# Patient Record
Sex: FEMALE | Race: WHITE | NOT HISPANIC OR LATINO | Employment: OTHER | URBAN - NONMETROPOLITAN AREA
[De-identification: names, ages, dates, MRNs, and addresses within clinical notes are randomized per-mention and may not be internally consistent; named-entity substitution may affect disease eponyms.]

---

## 2017-02-06 ENCOUNTER — OFFICE VISIT (OUTPATIENT)
Dept: CARDIOLOGY | Facility: CLINIC | Age: 72
End: 2017-02-06

## 2017-02-06 VITALS
DIASTOLIC BLOOD PRESSURE: 58 MMHG | BODY MASS INDEX: 25.2 KG/M2 | SYSTOLIC BLOOD PRESSURE: 96 MMHG | HEART RATE: 86 BPM | WEIGHT: 125 LBS | HEIGHT: 59 IN | RESPIRATION RATE: 16 BRPM

## 2017-02-06 DIAGNOSIS — I42.9 CARDIOMYOPATHY (HCC): ICD-10-CM

## 2017-02-06 DIAGNOSIS — I50.22 CHRONIC SYSTOLIC CONGESTIVE HEART FAILURE (HCC): ICD-10-CM

## 2017-02-06 DIAGNOSIS — I25.10 ASCVD (ARTERIOSCLEROTIC CARDIOVASCULAR DISEASE): Primary | ICD-10-CM

## 2017-02-06 DIAGNOSIS — R42 DIZZINESS: ICD-10-CM

## 2017-02-06 PROCEDURE — 93000 ELECTROCARDIOGRAM COMPLETE: CPT | Performed by: INTERNAL MEDICINE

## 2017-02-06 PROCEDURE — 99213 OFFICE O/P EST LOW 20 MIN: CPT | Performed by: INTERNAL MEDICINE

## 2017-02-06 NOTE — PROGRESS NOTES
Samuel Duane Kreis, MD  Kerri Kinney  1945      Patient Active Problem List   Diagnosis   •  ASCVD, S/P CABG 1997   • CKD (chronic kidney disease)   •  S/P ICD with recent gen change 2009   • History of chronic class II to III congestive heart failure   • Hypertension   • Anxiety   • Hyperlipidemia   • GERD (gastroesophageal reflux disease)   • Dizziness       Dear Dr. Torres:    Subjective     Kerri Kinney is a 71 y.o. female with the problems as listed above for cardiology follow-up of ASCVD.    History of Present Illness Ms. Kinney is a 71-year-old  female with history of known ASCVD, status post previous myocardial infarctions, status post CABG in 1997 with relatively stable clinical course since then, with underlying advanced ischemic, dilated cardiomyopathy, his year for regular cardiology follow-up.  She states that her chest pains are no worse, no better.  She notes occasional dizziness as of late.  No syncopal episodes.  She is mildly hypotensive in office today but denies any dizziness at this time.  She has chronic dyspnea with mild to moderate exertion with no PND, orthopnea or pedal edema.    Allergies   Allergen Reactions   • Codeine    • Demerol [Meperidine]    • Penicillins    • Terramycin [Oxytetracycline]        Current Outpatient Prescriptions:   •  acetaminophen (TYLENOL) 100 MG/ML solution, Take 10 mg/kg by mouth every 4 (four) hours as needed for fever., Disp: , Rfl:   •  alendronate (FOSAMAX) 70 MG tablet, Take 70 mg by mouth every 7 days., Disp: , Rfl:   •  allopurinol (ZYLOPRIM) 100 MG tablet, Take 100 mg by mouth daily., Disp: , Rfl:   •  aspirin 81 MG EC tablet, Take 81 mg by mouth daily., Disp: , Rfl:   •  atorvastatin (LIPITOR) 40 MG tablet, Take 40 mg by mouth daily., Disp: , Rfl:   •  carvedilol (COREG) 6.25 MG tablet, Take 1 tablet by mouth 2 (Two) Times a Day., Disp: 60 tablet, Rfl: 5  •  clonazePAM (KlonoPIN) 0.5 MG tablet, Take 0.5 mg by mouth 2 (two) times a day  as needed for seizures., Disp: , Rfl:   •  clopidogrel (PLAVIX) 75 MG tablet, TAKE ONE TABLET BY MOUTH DAILY, Disp: 90 tablet, Rfl: 4  •  dexlansoprazole (DEXILANT) 60 MG capsule, Take 60 mg by mouth daily., Disp: , Rfl:   •  diclofenac (VOLTAREN) 1 % gel gel, Apply 4 g topically 4 (Four) Times a Day As Needed., Disp: , Rfl:   •  DULoxetine (CYMBALTA) 60 MG capsule, Take 60 mg by mouth daily., Disp: , Rfl:   •  furosemide (LASIX) 40 MG tablet, Take 40 mg by mouth 2 (two) times a day., Disp: , Rfl:   •  gabapentin (NEURONTIN) 300 MG capsule, Take 300 mg by mouth 3 (three) times a day., Disp: , Rfl:   •  Garlic 1000 MG capsule, Take 1,000 mg by mouth Daily., Disp: , Rfl:   •  HYDROcodone-acetaminophen (NORCO) 5-325 MG per tablet, Take 1 tablet by mouth every 6 (six) hours as needed., Disp: , Rfl:   •  lisinopril (PRINIVIL,ZESTRIL) 10 MG tablet, Take 10 mg by mouth 2 (Two) Times a Day., Disp: , Rfl:   •  nitroglycerin (NITROSTAT) 0.4 MG SL tablet, Place 0.4 mg under the tongue every 5 (five) minutes as needed for chest pain. Take no more than 3 doses in 15 minutes., Disp: , Rfl:   •  Omega-3 Fatty Acids (FISH OIL) 1000 MG capsule capsule, Take  by mouth daily with breakfast., Disp: , Rfl:   •  tiZANidine (ZANAFLEX) 4 MG tablet, Take 4 mg by mouth at night as needed for muscle spasms., Disp: , Rfl:   •  fenofibrate (TRICOR) 48 MG tablet, Take 48 mg by mouth daily., Disp: , Rfl:        The following portions of the patient's history were reviewed and updated as appropriate: allergies, current medications, past family history, past medical history, past social history, past surgical history and problem list.          Review of Systems   Constitution: Negative for chills and fever.   HENT: Negative for headaches, nosebleeds and sore throat.    Respiratory: Negative for cough, hemoptysis and wheezing.    Gastrointestinal: Positive for constipation and heartburn. Negative for abdominal pain, hematemesis, hematochezia,  "melena, nausea and vomiting.   Genitourinary: Negative for dysuria and hematuria.       Objective   Blood pressure 96/58, pulse 86, resp. rate 16, height 59\" (149.9 cm), weight 125 lb (56.7 kg).  Last 2 weights    02/06/17  1416   Weight: 125 lb (56.7 kg)     Body mass index is 25.25 kg/(m^2).        Physical Exam   Constitutional: She is oriented to person, place, and time. She appears well-developed and well-nourished.   HENT:   Mouth/Throat: Oropharynx is clear and moist.   Eyes: EOM are normal. Pupils are equal, round, and reactive to light.   Neck: Neck supple. No JVD present. No tracheal deviation present. No thyromegaly present.   Cardiovascular: Normal rate, regular rhythm, S1 normal and S2 normal.  Exam reveals no gallop and no friction rub.    Pulmonary/Chest: Effort normal and breath sounds normal.   Clear   Abdominal: Soft. Bowel sounds are normal. She exhibits no mass. There is no tenderness.   Musculoskeletal: Normal range of motion. She exhibits no edema.   Lymphadenopathy:     She has no cervical adenopathy.   Neurological: She is alert and oriented to person, place, and time.   Skin: Skin is warm and dry. No rash noted.   Psychiatric: She has a normal mood and affect.         Lab Results   Component Value Date     05/31/2016    K 3.8 05/31/2016     05/31/2016    CO2 29 05/31/2016    BUN 23 05/31/2016    CREATININE 1.0 05/31/2016    GLUCOSE 106 (H) 05/31/2016    CALCIUM 9.9 05/31/2016    AST 33 (H) 05/13/2015    ALT 30 05/13/2015    ALKPHOS 82 05/13/2015    LABIL2 2.0 05/13/2015     No results found for: CKTOTAL  Lab Results   Component Value Date    WBC 6.08 05/31/2016    HGB 13.1 05/31/2016    HCT 38.6 05/31/2016     05/31/2016     Lab Results   Component Value Date    INR 1.00 05/31/2016         ECG 12 Lead  Date/Time: 2/6/2017 2:28 PM  Performed by: VINOD FISHER  Authorized by: VINOD FISHER   Comments: QTc 488            Assessment/Plan :   Kerri was seen today for " follow-up, chest pain, shortness of breath, palpitations and dizziness.  Diagnoses and all orders for this visit:    ASCVD, S/P CABG 1997, clinically stable.  Advanced ischemic cardiomyopathy, S/P ICD with recent gen change 2009, appears to be fairly well compensated.  CKD (chronic kidney disease), stage 3 (moderate)         Recommendations:    Find cost of Entresto; if affordable will change her from lisinopril to Entresto.  May lower Lasix dosage to 20 mg po daily.  If worsening edema/weight gain of more than 2-3 pounds, may take 40 mg po x5 days, then go back down to 20 mg po daily.  Keep a check on BP at home.  Continue Lisinopril, Aspirin, Plavix and Atorvastatin.   Return in about 3 months (around 5/6/2017).    As always, I appreciate very much the opportunity to participate in the cardiovascular care of your patients.      With Best Regards,    Krish Ocasio MD, [facility]    Scribed for Krish Ocasio MD by Roxann Gupta CMA 2/6/2017  3:13 PM

## 2017-02-10 ENCOUNTER — TELEPHONE (OUTPATIENT)
Dept: CARDIOLOGY | Facility: CLINIC | Age: 72
End: 2017-02-10

## 2017-02-10 DIAGNOSIS — Z79.899 MEDICATION CHANGED TO THERAPEUTIC EQUIVALENT: Primary | ICD-10-CM

## 2017-02-10 NOTE — TELEPHONE ENCOUNTER
----- Message from Halley Singleton MA sent at 2/10/2017  9:42 AM EST -----  Braden Ocasio wanted me to find the cost of Entresto he thought about switching her. Her cost for the medication would be $8.75. If you want her to change please send it to the MA in basket. Thank you

## 2017-02-10 NOTE — TELEPHONE ENCOUNTER
She will need to discontinue her lisinopril and wait 36 hours prior to starting entresto. After the 36hr wash out period, start entresto 24/26mg BID. Check BMP 1 week after starting entresto.

## 2017-02-16 ENCOUNTER — TELEPHONE (OUTPATIENT)
Dept: CARDIOLOGY | Facility: CLINIC | Age: 72
End: 2017-02-16

## 2017-02-16 NOTE — TELEPHONE ENCOUNTER
Spoke with patient she is agreeable to start Entresto. Patient was advised to stop Lisinopril and wait 36 hours to start he Entresto. She expressed understanding. Patient also expressed understanding that she would have to have lab work in 1 week and she was agreeable to do so.

## 2017-02-23 ENCOUNTER — LAB (OUTPATIENT)
Dept: LAB | Facility: HOSPITAL | Age: 72
End: 2017-02-23

## 2017-02-23 DIAGNOSIS — Z79.899 MEDICATION CHANGED TO THERAPEUTIC EQUIVALENT: ICD-10-CM

## 2017-02-23 LAB
ANION GAP SERPL CALCULATED.3IONS-SCNC: 4.5 MMOL/L (ref 3.6–11.2)
BUN BLD-MCNC: 31 MG/DL (ref 7–21)
BUN/CREAT SERPL: 29 (ref 7–25)
CALCIUM SPEC-SCNC: 9.4 MG/DL (ref 7.7–10)
CHLORIDE SERPL-SCNC: 104 MMOL/L (ref 99–112)
CO2 SERPL-SCNC: 33.5 MMOL/L (ref 24.3–31.9)
CREAT BLD-MCNC: 1.07 MG/DL (ref 0.43–1.29)
GFR SERPL CREATININE-BSD FRML MDRD: 51 ML/MIN/1.73
GLUCOSE BLD-MCNC: 85 MG/DL (ref 70–110)
OSMOLALITY SERPL CALC.SUM OF ELEC: 288.9 MOSM/KG (ref 273–305)
POTASSIUM BLD-SCNC: 4 MMOL/L (ref 3.5–5.3)
SODIUM BLD-SCNC: 142 MMOL/L (ref 135–153)

## 2017-02-23 PROCEDURE — 36415 COLL VENOUS BLD VENIPUNCTURE: CPT

## 2017-02-23 PROCEDURE — 80048 BASIC METABOLIC PNL TOTAL CA: CPT | Performed by: PHYSICIAN ASSISTANT

## 2017-02-24 ENCOUNTER — TELEPHONE (OUTPATIENT)
Dept: CARDIOLOGY | Facility: CLINIC | Age: 72
End: 2017-02-24

## 2017-02-24 NOTE — TELEPHONE ENCOUNTER
----- Message from JULES Escalante sent at 2/24/2017  8:46 AM EST -----  See how much lasix she is taking currently.

## 2017-02-27 ENCOUNTER — TELEPHONE (OUTPATIENT)
Dept: CARDIOLOGY | Facility: CLINIC | Age: 72
End: 2017-02-27

## 2017-02-27 DIAGNOSIS — Z79.899 DRUG THERAPY: Primary | ICD-10-CM

## 2017-02-27 NOTE — TELEPHONE ENCOUNTER
----- Message from JULES Escalante sent at 2/27/2017  3:04 PM EST -----  Decrease lasix to 20mg QD. Recheck BMP in 1 week.

## 2017-02-27 NOTE — TELEPHONE ENCOUNTER
Notified pt of Ana's instructions.  She was agreeable and verbalized understanding.  She asked if the blood work was to check for the medication.  Per Ana DE LA TORRE, this is to check her kidney ftn, as it was slightly decreased since last check (prior to starting Entresto).  I explained this to pt and she verbalized understanding.  I will place order for BMP.

## 2017-03-08 ENCOUNTER — LAB (OUTPATIENT)
Dept: LAB | Facility: HOSPITAL | Age: 72
End: 2017-03-08

## 2017-03-08 DIAGNOSIS — Z79.899 DRUG THERAPY: ICD-10-CM

## 2017-03-08 LAB
ANION GAP SERPL CALCULATED.3IONS-SCNC: 5.4 MMOL/L (ref 3.6–11.2)
BUN BLD-MCNC: 29 MG/DL (ref 7–21)
BUN/CREAT SERPL: 35.4 (ref 7–25)
CALCIUM SPEC-SCNC: 9.8 MG/DL (ref 7.7–10)
CHLORIDE SERPL-SCNC: 105 MMOL/L (ref 99–112)
CO2 SERPL-SCNC: 30.6 MMOL/L (ref 24.3–31.9)
CREAT BLD-MCNC: 0.82 MG/DL (ref 0.43–1.29)
GFR SERPL CREATININE-BSD FRML MDRD: 69 ML/MIN/1.73
GLUCOSE BLD-MCNC: 88 MG/DL (ref 70–110)
OSMOLALITY SERPL CALC.SUM OF ELEC: 286.5 MOSM/KG (ref 273–305)
POTASSIUM BLD-SCNC: 4.4 MMOL/L (ref 3.5–5.3)
SODIUM BLD-SCNC: 141 MMOL/L (ref 135–153)

## 2017-03-08 PROCEDURE — 80048 BASIC METABOLIC PNL TOTAL CA: CPT | Performed by: PHYSICIAN ASSISTANT

## 2017-03-08 PROCEDURE — 36415 COLL VENOUS BLD VENIPUNCTURE: CPT

## 2017-04-19 RX ORDER — SACUBITRIL AND VALSARTAN 24; 26 MG/1; MG/1
TABLET, FILM COATED ORAL
Qty: 60 TABLET | Refills: 1 | Status: SHIPPED | OUTPATIENT
Start: 2017-04-19 | End: 2017-06-30 | Stop reason: SDUPTHER

## 2017-06-19 RX ORDER — CARVEDILOL 6.25 MG/1
TABLET ORAL
Qty: 60 TABLET | Refills: 1 | Status: SHIPPED | OUTPATIENT
Start: 2017-06-19 | End: 2017-08-21 | Stop reason: SDUPTHER

## 2017-06-30 RX ORDER — SACUBITRIL AND VALSARTAN 24; 26 MG/1; MG/1
TABLET, FILM COATED ORAL
Qty: 60 TABLET | Refills: 2 | Status: SHIPPED | OUTPATIENT
Start: 2017-06-30 | End: 2017-09-20 | Stop reason: SDUPTHER

## 2017-08-21 RX ORDER — CARVEDILOL 6.25 MG/1
TABLET ORAL
Qty: 60 TABLET | Refills: 3 | Status: SHIPPED | OUTPATIENT
Start: 2017-08-21 | End: 2017-11-02 | Stop reason: SDUPTHER

## 2017-08-24 ENCOUNTER — OFFICE VISIT (OUTPATIENT)
Dept: CARDIOLOGY | Facility: CLINIC | Age: 72
End: 2017-08-24

## 2017-08-24 VITALS
RESPIRATION RATE: 16 BRPM | HEART RATE: 88 BPM | SYSTOLIC BLOOD PRESSURE: 92 MMHG | BODY MASS INDEX: 25 KG/M2 | WEIGHT: 124 LBS | DIASTOLIC BLOOD PRESSURE: 57 MMHG | HEIGHT: 59 IN

## 2017-08-24 DIAGNOSIS — I25.10 ASCVD (ARTERIOSCLEROTIC CARDIOVASCULAR DISEASE): Primary | ICD-10-CM

## 2017-08-24 DIAGNOSIS — E78.2 MIXED HYPERLIPIDEMIA: ICD-10-CM

## 2017-08-24 DIAGNOSIS — I25.5 ISCHEMIC CARDIOMYOPATHY: ICD-10-CM

## 2017-08-24 DIAGNOSIS — I42.9 CARDIOMYOPATHY (HCC): ICD-10-CM

## 2017-08-24 PROCEDURE — 99213 OFFICE O/P EST LOW 20 MIN: CPT | Performed by: INTERNAL MEDICINE

## 2017-08-24 PROCEDURE — 93000 ELECTROCARDIOGRAM COMPLETE: CPT | Performed by: INTERNAL MEDICINE

## 2017-08-24 RX ORDER — CLOPIDOGREL BISULFATE 75 MG/1
75 TABLET ORAL DAILY
Qty: 90 TABLET | Refills: 4 | Status: SHIPPED | OUTPATIENT
Start: 2017-08-24 | End: 2017-12-21 | Stop reason: SDUPTHER

## 2017-08-24 RX ORDER — NITROGLYCERIN 0.4 MG/1
TABLET SUBLINGUAL
Qty: 25 TABLET | Refills: 3 | Status: SHIPPED | OUTPATIENT
Start: 2017-08-24 | End: 2018-08-13 | Stop reason: SDUPTHER

## 2017-08-24 NOTE — PROGRESS NOTES
Samuel Duane Kreis, MD  Kerri Kinney  1945 08/24/2017    Patient Active Problem List   Diagnosis   •  ASCVD, S/P CABG 1997   • CKD (chronic kidney disease)   •  S/P ICD with recent gen change 2009   • Preop cardiovascular exam   • History of chronic class II to III congestive heart failure   • Coronary artery disease   • Hypertension   • Depression   • Anxiety   • Hyperlipidemia   • GERD (gastroesophageal reflux disease)   • Dizziness   • Ischemic cardiomyopathy       Dear Samuel Duane Kreis, MD:    Subjective     Kerri Kinney is a 72 y.o. female with the problems as listed above, presents      History of Present Illness:Ms. Kinney is a very pleasant 72-year-old  female with history of known ASCVD, status post previous myocardial infarction, status post CABG in 1997 with underlying advanced ischemic cardiomyopathy, is here for her regular cardiology follow-up.  She has some occasional left upper chest pains that seem to resolve spontaneously.  She has chronic NYHA class III dyspnea with no PND, orthopnea pedal edema.  Overall she's been doing relatively well from cardiac standpoint.  She is considering to have back surgery for degenerative disc disease with spinal stenosis with right leg pain.        Allergies   Allergen Reactions   • Codeine    • Demerol [Meperidine]    • Penicillins    • Terramycin [Oxytetracycline]    :      Current Outpatient Prescriptions:   •  allopurinol (ZYLOPRIM) 100 MG tablet, Take 100 mg by mouth daily., Disp: , Rfl:   •  aspirin 81 MG EC tablet, Take 81 mg by mouth daily., Disp: , Rfl:   •  atorvastatin (LIPITOR) 40 MG tablet, Take 40 mg by mouth daily., Disp: , Rfl:   •  clonazePAM (KlonoPIN) 0.5 MG tablet, Take 0.5 mg by mouth 2 (two) times a day as needed for seizures., Disp: , Rfl:   •  clopidogrel (PLAVIX) 75 MG tablet, Take 1 tablet by mouth Daily., Disp: 90 tablet, Rfl: 4  •  dexlansoprazole (DEXILANT) 60 MG capsule, Take 60 mg by mouth daily., Disp: , Rfl:   •   diclofenac (VOLTAREN) 1 % gel gel, Apply 4 g topically 4 (Four) Times a Day As Needed., Disp: , Rfl:   •  DULoxetine (CYMBALTA) 60 MG capsule, Take 60 mg by mouth daily., Disp: , Rfl:   •  ENTRESTO 24-26 MG tablet, TAKE ONE TABLET BY MOUTH TWICE A DAY, Disp: 60 tablet, Rfl: 2  •  gabapentin (NEURONTIN) 300 MG capsule, Take 300 mg by mouth 3 (three) times a day., Disp: , Rfl:   •  Garlic 1000 MG capsule, Take 1,000 mg by mouth Daily., Disp: , Rfl:   •  HYDROcodone-acetaminophen (NORCO) 5-325 MG per tablet, Take 1 tablet by mouth every 6 (six) hours as needed., Disp: , Rfl:   •  nitroglycerin (NITROSTAT) 0.4 MG SL tablet, Place 0.4 mg under the tongue every 5 (five) minutes as needed for chest pain. Take no more than 3 doses in 15 minutes., Disp: , Rfl:   •  Omega-3 Fatty Acids (FISH OIL) 1000 MG capsule capsule, Take  by mouth daily with breakfast., Disp: , Rfl:   •  tiZANidine (ZANAFLEX) 4 MG tablet, Take 4 mg by mouth at night as needed for muscle spasms., Disp: , Rfl:   •  acetaminophen (TYLENOL) 100 MG/ML solution, Take 10 mg/kg by mouth every 4 (four) hours as needed for fever., Disp: , Rfl:   •  alendronate (FOSAMAX) 70 MG tablet, Take 70 mg by mouth every 7 days., Disp: , Rfl:   •  carvedilol (COREG) 6.25 MG tablet, TAKE ONE TABLET BY MOUTH TWICE A DAY, Disp: 60 tablet, Rfl: 3  •  fenofibrate (TRICOR) 48 MG tablet, Take 48 mg by mouth daily., Disp: , Rfl:   •  furosemide (LASIX) 40 MG tablet, Take 40 mg by mouth As Needed., Disp: , Rfl:   •  nitroglycerin (NITROSTAT) 0.4 MG SL tablet, 1 under the tongue as needed for angina, may repeat q5mins for up three doses, Disp: 25 tablet, Rfl: 3      The following portions of the patient's history were reviewed and updated as appropriate: allergies, current medications, past family history, past medical history, past social history, past surgical history and problem list.    Social History   Substance Use Topics   • Smoking status: Former Smoker     Packs/day: 2.00      "Types: Cigarettes     Quit date: 1997   • Smokeless tobacco: Never Used   • Alcohol use No       Review of Systems   Constitution: Negative for chills and fever.   HENT: Negative for headaches, nosebleeds and sore throat.    Cardiovascular: Positive for chest pain and palpitations.   Respiratory: Positive for shortness of breath. Negative for cough, hemoptysis and wheezing.    Gastrointestinal: Negative for abdominal pain, hematemesis, hematochezia, melena, nausea and vomiting.   Genitourinary: Negative for dysuria and hematuria.       Objective   Vitals:    08/24/17 1346   BP: 92/57   Pulse: 88   Resp: 16   Weight: 124 lb (56.2 kg)   Height: 59\" (149.9 cm)     Body mass index is 25.04 kg/(m^2).        Physical Exam   Constitutional: She is oriented to person, place, and time. She appears well-developed and well-nourished.   HENT:   Head: Normocephalic.   Eyes: Conjunctivae and EOM are normal.   Neck: Normal range of motion. Neck supple. No JVD present. No tracheal deviation present. No thyromegaly present.   Cardiovascular: Normal rate, regular rhythm, S1 normal and S2 normal.  Exam reveals no gallop, no S3, no S4 and no friction rub.    No murmur heard.  Pulses:       Dorsalis pedis pulses are 1+ on the right side, and 1+ on the left side.        Posterior tibial pulses are 1+ on the right side, and 1+ on the left side.   Pulmonary/Chest: Breath sounds normal. No respiratory distress. She has no wheezes. She has no rales.   Abdominal: Soft. Bowel sounds are normal. She exhibits no mass. There is no tenderness.   Musculoskeletal: She exhibits no edema.   Neurological: She is alert and oriented to person, place, and time. No cranial nerve deficit.   Skin: Skin is warm and dry.   Psychiatric: She has a normal mood and affect.       Lab Results   Component Value Date     03/08/2017    K 4.4 03/08/2017     03/08/2017    CO2 30.6 03/08/2017    BUN 29 (H) 03/08/2017    CREATININE 0.82 03/08/2017    GLUCOSE 88 " 03/08/2017    CALCIUM 9.8 03/08/2017    AST 33 (H) 05/13/2015    ALT 30 05/13/2015    ALKPHOS 82 05/13/2015    LABIL2 2.0 05/13/2015     No results found for: CKTOTAL  Lab Results   Component Value Date    WBC 6.08 05/31/2016    HGB 13.1 05/31/2016    HCT 38.6 05/31/2016     05/31/2016     Lab Results   Component Value Date    INR 1.00 05/31/2016     Echo   No results found for: ECHOEFEST    ECG 12 Lead  Date/Time: 8/24/2017 2:02 PM  Performed by: KRISH FISHER  Authorized by: KRISH FISHER   Rhythm: sinus rhythm  Comments: Biventricular paced rhythm.            Assessment/Plan      1.  ASCVD, S/P CABG 1997, Clinically stable.      2. Ischemic cardiomyopathy with LV ejection fraction of about 25-30%.  Patient appears to be fairly well compensated.      3. Mixed hyperlipidemia     4.  S/P ICD with recent gen change 2009 , Being followed by Dr. Andrade        Recommendations:    1. Continue with aspirin, Plavix, atorvastatin, and Entresto at the current doses.  2. With regards to back surgery, I have discussed with her about the cardiac risks which would be probably at least moderate if not high cardiac risk due to her significant coronary artery disease with underlying ischemic cardiomyopathy.  However on the bright side, patient went to right knee replacement recently without any cardiac event which gives some hope that she might be able to tolerate the back surgery if it is absolutely needed.  3. Follow-up in about 3-4 months.       Return in about 6 months (around 2/24/2018).    As always, I appreciate very much the opportunity to participate in the cardiovascular care of your patients.      With Best Regards,    Krish Fisher MD, [facility]    Dragon disclaimer:  Much of this encounter note is an electronic transcription/translation of spoken language to printed text. The electronic translation of spoken language may permit erroneous, or at times, nonsensical words or phrases to be inadvertently  transcribed; Although I have reviewed the note for such errors, some may still exist.

## 2017-09-19 RX ORDER — ISOSORBIDE MONONITRATE 30 MG/1
TABLET, EXTENDED RELEASE ORAL
Qty: 90 TABLET | Refills: 0 | Status: SHIPPED | OUTPATIENT
Start: 2017-09-19 | End: 2017-12-28 | Stop reason: SDUPTHER

## 2017-09-21 RX ORDER — SACUBITRIL AND VALSARTAN 24; 26 MG/1; MG/1
TABLET, FILM COATED ORAL
Qty: 120 TABLET | Refills: 1 | Status: SHIPPED | OUTPATIENT
Start: 2017-09-21 | End: 2018-02-26 | Stop reason: SDUPTHER

## 2017-11-02 ENCOUNTER — OFFICE VISIT (OUTPATIENT)
Dept: CARDIOLOGY | Facility: CLINIC | Age: 72
End: 2017-11-02

## 2017-11-02 VITALS
HEART RATE: 79 BPM | HEIGHT: 59 IN | WEIGHT: 128 LBS | SYSTOLIC BLOOD PRESSURE: 133 MMHG | DIASTOLIC BLOOD PRESSURE: 71 MMHG | RESPIRATION RATE: 16 BRPM | BODY MASS INDEX: 25.8 KG/M2

## 2017-11-02 DIAGNOSIS — I25.10 ASCVD (ARTERIOSCLEROTIC CARDIOVASCULAR DISEASE): Primary | ICD-10-CM

## 2017-11-02 DIAGNOSIS — N18.30 STAGE 3 CHRONIC KIDNEY DISEASE (HCC): ICD-10-CM

## 2017-11-02 DIAGNOSIS — I25.5 ISCHEMIC CARDIOMYOPATHY: ICD-10-CM

## 2017-11-02 DIAGNOSIS — I10 ESSENTIAL HYPERTENSION: ICD-10-CM

## 2017-11-02 PROCEDURE — 99213 OFFICE O/P EST LOW 20 MIN: CPT | Performed by: INTERNAL MEDICINE

## 2017-11-02 RX ORDER — CARVEDILOL 6.25 MG/1
12.5 TABLET ORAL 2 TIMES DAILY WITH MEALS
Qty: 120 TABLET | Refills: 3 | Status: SHIPPED | OUTPATIENT
Start: 2017-11-02 | End: 2018-08-13 | Stop reason: DRUGHIGH

## 2017-11-02 NOTE — PROGRESS NOTES
Samuel Duane Kreis, MD  Kerri Kinney  1945 11/02/2017    Patient Active Problem List   Diagnosis   •  ASCVD, S/P CABG 1997   • CKD (chronic kidney disease)   •  S/P ICD with recent gen change 2009   • Preop cardiovascular exam   • History of chronic class II to III congestive heart failure   • Coronary artery disease   • Hypertension   • Depression   • Anxiety   • Hyperlipidemia   • GERD (gastroesophageal reflux disease)   • Dizziness   • Ischemic cardiomyopathy       Dear Samuel Duane Kreis, MD:    Subjective     Kerri Kinney is a 72 y.o. female with the problems as listed above, presents      History of Present Illness:Ms. Kinney is a pleasant 72-year-old  female with the history of known ASCVD, status post previous myocardial infarctions, status post CABG in 1997 and status post PCI's, with underlying advanced ischemic cardiomyopathy, is here for a cardiology follow-up.  She denies any complains of chest pains.  She has intermittent episodes of increasing shortness of breath.  No recent leg edema.  She denies any orthopnea.  She gained 4 pounds since 8/24/2017.       Allergies   Allergen Reactions   • Codeine    • Demerol [Meperidine]    • Penicillins    • Terramycin [Oxytetracycline]    :      Current Outpatient Prescriptions:   •  acetaminophen (TYLENOL) 100 MG/ML solution, Take 10 mg/kg by mouth every 4 (four) hours as needed for fever., Disp: , Rfl:   •  allopurinol (ZYLOPRIM) 100 MG tablet, Take 100 mg by mouth daily., Disp: , Rfl:   •  aspirin 81 MG EC tablet, Take 81 mg by mouth daily., Disp: , Rfl:   •  atorvastatin (LIPITOR) 40 MG tablet, Take 40 mg by mouth daily., Disp: , Rfl:   •  carvedilol (COREG) 6.25 MG tablet, Take 2 tablets by mouth 2 (Two) Times a Day With Meals., Disp: 120 tablet, Rfl: 3  •  clonazePAM (KlonoPIN) 0.5 MG tablet, Take 0.5 mg by mouth 2 (two) times a day as needed for seizures., Disp: , Rfl:   •  clopidogrel (PLAVIX) 75 MG tablet, Take 1 tablet by mouth  Daily., Disp: 90 tablet, Rfl: 4  •  dexlansoprazole (DEXILANT) 60 MG capsule, Take 60 mg by mouth daily., Disp: , Rfl:   •  diclofenac (VOLTAREN) 1 % gel gel, Apply 4 g topically 4 (Four) Times a Day As Needed., Disp: , Rfl:   •  DULoxetine (CYMBALTA) 60 MG capsule, Take 60 mg by mouth daily., Disp: , Rfl:   •  ENTRESTO 24-26 MG tablet, TAKE ONE TABLET BY MOUTH TWICE A DAY, Disp: 120 tablet, Rfl: 1  •  furosemide (LASIX) 40 MG tablet, Take 40 mg by mouth As Needed., Disp: , Rfl:   •  gabapentin (NEURONTIN) 300 MG capsule, Take 300 mg by mouth 3 (three) times a day., Disp: , Rfl:   •  Garlic 1000 MG capsule, Take 1,000 mg by mouth Daily., Disp: , Rfl:   •  HYDROcodone-acetaminophen (NORCO) 5-325 MG per tablet, Take 1 tablet by mouth every 6 (six) hours as needed., Disp: , Rfl:   •  nitroglycerin (NITROSTAT) 0.4 MG SL tablet, Place 0.4 mg under the tongue every 5 (five) minutes as needed for chest pain. Take no more than 3 doses in 15 minutes., Disp: , Rfl:   •  nitroglycerin (NITROSTAT) 0.4 MG SL tablet, 1 under the tongue as needed for angina, may repeat q5mins for up three doses, Disp: 25 tablet, Rfl: 3  •  Omega-3 Fatty Acids (FISH OIL) 1000 MG capsule capsule, Take  by mouth daily with breakfast., Disp: , Rfl:   •  tiZANidine (ZANAFLEX) 4 MG tablet, Take 4 mg by mouth at night as needed for muscle spasms., Disp: , Rfl:   •  alendronate (FOSAMAX) 70 MG tablet, Take 70 mg by mouth every 7 days., Disp: , Rfl:   •  fenofibrate (TRICOR) 48 MG tablet, Take 48 mg by mouth daily., Disp: , Rfl:   •  isosorbide mononitrate (IMDUR) 30 MG 24 hr tablet, TAKE ONE TABLET BY MOUTH DAILY, Disp: 90 tablet, Rfl: 0      The following portions of the patient's history were reviewed and updated as appropriate: allergies, current medications, past family history, past medical history, past social history, past surgical history and problem list.    Social History   Substance Use Topics   • Smoking status: Former Smoker     Packs/day:  "2.00     Types: Cigarettes     Quit date: 1997   • Smokeless tobacco: Never Used   • Alcohol use No       Review of Systems   Constitution: Negative for chills and fever.   HENT: Negative for nosebleeds and sore throat.    Respiratory: Negative for cough, hemoptysis and wheezing.    Gastrointestinal: Negative for abdominal pain, hematemesis, hematochezia, melena, nausea and vomiting.   Genitourinary: Negative for dysuria and hematuria.   Neurological: Negative for headaches.       Objective   Vitals:    11/02/17 1334   BP: 133/71   Pulse: 79   Resp: 16   Weight: 128 lb (58.1 kg)   Height: 59\" (149.9 cm)     Body mass index is 25.85 kg/(m^2).    11/2/17 8/24/17 2/6/17    /71 92/57 96/58   Heart Rate 79 88 86   Resp 16 16 16   Weight 128 lb (58.1 kg) 124 lb (56.2 kg) 125 lb (56.7 kg)   Height 59\" (149.9 cm) 59\" (149.9 cm) 59\" (149.9 cm)   BMI (Calculated) 25.8 25 25.2   BSA (Calculated - sq m) 1.53 sq meters 1.5 sq meters 1.51 sq meters         Physical Exam   Constitutional: She is oriented to person, place, and time. She appears well-developed and well-nourished.   HENT:   Head: Normocephalic.   Eyes: Conjunctivae and EOM are normal.   Neck: Normal range of motion. Neck supple. No JVD present. No tracheal deviation present. No thyromegaly present.   Cardiovascular: Normal rate, regular rhythm, S1 normal and S2 normal.  Exam reveals no gallop, no S3, no S4 and no friction rub.    No murmur heard.  Pulmonary/Chest: Breath sounds normal. No respiratory distress. She has no wheezes. She has no rales.   Abdominal: Soft. Bowel sounds are normal. She exhibits no mass. There is no tenderness.   Musculoskeletal: She exhibits no edema.   Neurological: She is alert and oriented to person, place, and time. No cranial nerve deficit.   Skin: Skin is warm and dry.   Psychiatric: She has a normal mood and affect.       Lab Results   Component Value Date     03/08/2017    K 4.4 03/08/2017     03/08/2017    CO2 30.6 " 03/08/2017    BUN 29 (H) 03/08/2017    CREATININE 0.82 03/08/2017    GLUCOSE 88 03/08/2017    CALCIUM 9.8 03/08/2017    AST 33 (H) 05/13/2015    ALT 30 05/13/2015    ALKPHOS 82 05/13/2015    LABIL2 2.0 05/13/2015     No results found for: CKTOTAL  Lab Results   Component Value Date    WBC 6.08 05/31/2016    HGB 13.1 05/31/2016    HCT 38.6 05/31/2016     05/31/2016     Lab Results   Component Value Date    INR 1.00 05/31/2016       No results found for: ECHOEFEST  Procedures    Assessment/Plan :     1. ASCVD, S/P CABG 1997,s/p PCI,Clinically stable.      2. Ischemic cardiomyopathy, appears to be  compensated.      3. Essential hypertension, Controlled.      4. Stage 3 chronic kidney disease            Recommendations:  1.  Will increase the Coreg to 12.5 mg by mouth twice a day.  2. I asked her to keep a check on the blood pressure at home and if it is running more than 120 systolic, maybe we could go up on the Entresto to the next dose of 49/51 mg by mouth twice a day.  3. I instructed her on 2 avoid salt rich foods especially processed meats.  4. Check CMP  5. Follow-up in 3-4 months.    Return in about 3 months (around 2/2/2018).    As always, I appreciate very much the opportunity to participate in the cardiovascular care of your patients.      With Best Regards,    Krish Ocasio MD, [facility]    Dragon disclaimer:  Much of this encounter note is an electronic transcription/translation of spoken language to printed text. The electronic translation of spoken language may permit erroneous, or at times, nonsensical words or phrases to be inadvertently transcribed; Although I have reviewed the note for such errors, some may still exist.

## 2017-11-09 ENCOUNTER — DOCUMENTATION (OUTPATIENT)
Dept: CARDIOLOGY | Facility: CLINIC | Age: 72
End: 2017-11-09

## 2017-12-08 ENCOUNTER — TRANSCRIBE ORDERS (OUTPATIENT)
Dept: ADMINISTRATIVE | Facility: HOSPITAL | Age: 72
End: 2017-12-08

## 2017-12-08 DIAGNOSIS — Z12.31 VISIT FOR SCREENING MAMMOGRAM: Primary | ICD-10-CM

## 2017-12-21 ENCOUNTER — TELEPHONE (OUTPATIENT)
Dept: CARDIOLOGY | Facility: CLINIC | Age: 72
End: 2017-12-21

## 2017-12-21 RX ORDER — CLOPIDOGREL BISULFATE 75 MG/1
75 TABLET ORAL DAILY
Qty: 90 TABLET | Refills: 0 | Status: SHIPPED | OUTPATIENT
Start: 2017-12-21 | End: 2020-09-15 | Stop reason: ALTCHOICE

## 2017-12-28 RX ORDER — ISOSORBIDE MONONITRATE 30 MG/1
TABLET, EXTENDED RELEASE ORAL
Qty: 90 TABLET | Refills: 0 | Status: SHIPPED | OUTPATIENT
Start: 2017-12-28 | End: 2019-01-17 | Stop reason: SDUPTHER

## 2018-01-09 ENCOUNTER — HOSPITAL ENCOUNTER (OUTPATIENT)
Dept: MAMMOGRAPHY | Facility: HOSPITAL | Age: 73
Discharge: HOME | End: 2018-01-09
Attending: FAMILY MEDICINE | Admitting: FAMILY MEDICINE

## 2018-01-09 DIAGNOSIS — Z12.31 VISIT FOR SCREENING MAMMOGRAM: ICD-10-CM

## 2018-01-09 PROCEDURE — 77067 SCR MAMMO BI INCL CAD: CPT

## 2018-01-09 PROCEDURE — 77063 BREAST TOMOSYNTHESIS BI: CPT | Performed by: RADIOLOGY

## 2018-01-09 PROCEDURE — 77063 BREAST TOMOSYNTHESIS BI: CPT

## 2018-01-09 PROCEDURE — 77067 SCR MAMMO BI INCL CAD: CPT | Performed by: RADIOLOGY

## 2018-02-01 ENCOUNTER — OFFICE VISIT (OUTPATIENT)
Dept: CARDIOLOGY | Facility: CLINIC | Age: 73
End: 2018-02-01

## 2018-02-01 VITALS
HEART RATE: 82 BPM | BODY MASS INDEX: 25.4 KG/M2 | DIASTOLIC BLOOD PRESSURE: 59 MMHG | HEIGHT: 59 IN | WEIGHT: 126 LBS | RESPIRATION RATE: 16 BRPM | SYSTOLIC BLOOD PRESSURE: 91 MMHG

## 2018-02-01 DIAGNOSIS — N18.30 STAGE 3 CHRONIC KIDNEY DISEASE (HCC): ICD-10-CM

## 2018-02-01 DIAGNOSIS — I25.10 ASCVD (ARTERIOSCLEROTIC CARDIOVASCULAR DISEASE): Primary | ICD-10-CM

## 2018-02-01 DIAGNOSIS — R07.2 PRECORDIAL PAIN: ICD-10-CM

## 2018-02-01 DIAGNOSIS — E78.2 MIXED HYPERLIPIDEMIA: ICD-10-CM

## 2018-02-01 DIAGNOSIS — I25.5 ISCHEMIC CARDIOMYOPATHY: ICD-10-CM

## 2018-02-01 PROCEDURE — 93000 ELECTROCARDIOGRAM COMPLETE: CPT | Performed by: INTERNAL MEDICINE

## 2018-02-01 PROCEDURE — 99214 OFFICE O/P EST MOD 30 MIN: CPT | Performed by: INTERNAL MEDICINE

## 2018-02-01 RX ORDER — IMIPRAMINE HYDROCHLORIDE 10 MG/1
10 TABLET, FILM COATED ORAL 4 TIMES DAILY
COMMUNITY

## 2018-02-01 NOTE — PROGRESS NOTES
Samuel Duane Kreis, MD  Kerri Kinney  1945 02/01/2018    Patient Active Problem List   Diagnosis   •  ASCVD, S/P CABG 1997   • CKD (chronic kidney disease) (stage III)   •  S/P ICD with recent gen change 2009   • Preop cardiovascular exam   • History of chronic class II to III congestive heart failure   • Coronary artery disease   • Hypertension   • Depression   • Anxiety   • Hyperlipidemia   • GERD (gastroesophageal reflux disease)   • Dizziness   • Ischemic cardiomyopathy with LV ejection fraction of 24-30% on echo in December 2015 with NYHA class 2-3 dyspnea.   • Precordial pain       Dear Samuel Duane Kreis, MD:    Subjective     Kerri Kinney is a 72 y.o. female with the problems as listed above, presents    Chief complaint: Follow-up of coronary artery disease and ischemic cardiomyopathy.    History of Present Illness: Ms. Kinney is a pleasant 72-year-old  female with history of known ASCVD, status post previous myocardial infarctions, status post CABG 1997 with underlying advanced ischemic cardiomyopathy, status post ICD implantation with generator change in 2009,  is here for a regular cardiology follow-up.  She has been having some intermittent episodes of left upper chest pain with radiation up into the neck and into the left arm which are relieved with sublingual nitroglycerin.These seem to occur with mild exertion and relieved with rest.  She has dyspnea with mild-to-moderate exertion with no PND, orthopnea and pedal edema.    Allergies   Allergen Reactions   • Codeine    • Demerol [Meperidine]    • Penicillins    • Terramycin [Oxytetracycline]    :      Current Outpatient Prescriptions:   •  acetaminophen (TYLENOL) 100 MG/ML solution, Take 10 mg/kg by mouth every 4 (four) hours as needed for fever., Disp: , Rfl:   •  allopurinol (ZYLOPRIM) 100 MG tablet, Take 100 mg by mouth daily., Disp: , Rfl:   •  aspirin 81 MG EC tablet, Take 81 mg by mouth daily., Disp: , Rfl:   •  atorvastatin  (LIPITOR) 40 MG tablet, Take 40 mg by mouth daily., Disp: , Rfl:   •  carvedilol (COREG) 6.25 MG tablet, Take 2 tablets by mouth 2 (Two) Times a Day With Meals., Disp: 120 tablet, Rfl: 3  •  clonazePAM (KlonoPIN) 0.5 MG tablet, Take 0.5 mg by mouth 2 (two) times a day as needed for seizures., Disp: , Rfl:   •  clopidogrel (PLAVIX) 75 MG tablet, Take 1 tablet by mouth Daily., Disp: 90 tablet, Rfl: 0  •  dexlansoprazole (DEXILANT) 60 MG capsule, Take 60 mg by mouth daily., Disp: , Rfl:   •  diclofenac (VOLTAREN) 1 % gel gel, Apply 4 g topically 4 (Four) Times a Day As Needed., Disp: , Rfl:   •  DULoxetine (CYMBALTA) 60 MG capsule, Take 60 mg by mouth daily., Disp: , Rfl:   •  ENTRESTO 24-26 MG tablet, TAKE ONE TABLET BY MOUTH TWICE A DAY, Disp: 120 tablet, Rfl: 1  •  furosemide (LASIX) 40 MG tablet, Take 40 mg by mouth As Needed., Disp: , Rfl:   •  gabapentin (NEURONTIN) 300 MG capsule, Take 300 mg by mouth 3 (three) times a day., Disp: , Rfl:   •  Garlic 1000 MG capsule, Take 1,000 mg by mouth Daily., Disp: , Rfl:   •  HYDROcodone-acetaminophen (NORCO) 5-325 MG per tablet, Take 1 tablet by mouth every 6 (six) hours as needed., Disp: , Rfl:   •  imipramine (TOFRANIL) 10 MG tablet, Take 10 mg by mouth 4 (Four) Times a Day., Disp: , Rfl:   •  isosorbide mononitrate (IMDUR) 30 MG 24 hr tablet, TAKE ONE TABLET BY MOUTH DAILY, Disp: 90 tablet, Rfl: 0  •  nitroglycerin (NITROSTAT) 0.4 MG SL tablet, Place 0.4 mg under the tongue every 5 (five) minutes as needed for chest pain. Take no more than 3 doses in 15 minutes., Disp: , Rfl:   •  nitroglycerin (NITROSTAT) 0.4 MG SL tablet, 1 under the tongue as needed for angina, may repeat q5mins for up three doses, Disp: 25 tablet, Rfl: 3  •  Omega-3 Fatty Acids (FISH OIL) 1000 MG capsule capsule, Take  by mouth daily with breakfast., Disp: , Rfl:   •  tiZANidine (ZANAFLEX) 4 MG tablet, Take 4 mg by mouth at night as needed for muscle spasms., Disp: , Rfl:   •  alendronate (FOSAMAX)  "70 MG tablet, Take 70 mg by mouth every 7 days., Disp: , Rfl:   •  fenofibrate (TRICOR) 48 MG tablet, Take 48 mg by mouth daily., Disp: , Rfl:       The following portions of the patient's history were reviewed and updated as appropriate: allergies, current medications, past family history, past medical history, past social history, past surgical history and problem list.    Social History   Substance Use Topics   • Smoking status: Former Smoker     Packs/day: 2.00     Types: Cigarettes     Quit date: 1997   • Smokeless tobacco: Never Used   • Alcohol use No       Review of Systems   Constitution: Negative for chills and fever.   HENT: Negative for nosebleeds and sore throat.    Cardiovascular: Positive for chest pain and palpitations.   Respiratory: Positive for shortness of breath. Negative for cough, hemoptysis and wheezing.    Gastrointestinal: Negative for abdominal pain, hematemesis, hematochezia, melena, nausea and vomiting.   Genitourinary: Negative for dysuria and hematuria.   Neurological: Negative for headaches.       Objective   Vitals:    02/01/18 1351   BP: 91/59   Pulse: 82   Resp: 16   Weight: 57.2 kg (126 lb)   Height: 149.9 cm (59\")     Body mass index is 25.45 kg/(m^2).        Physical Exam   Constitutional: She is oriented to person, place, and time. She appears well-developed and well-nourished.   HENT:   Head: Normocephalic.   Eyes: Conjunctivae and EOM are normal.   Neck: Normal range of motion. Neck supple. No JVD present. No tracheal deviation present. No thyromegaly present.   Cardiovascular: Normal rate, regular rhythm, S1 normal and S2 normal.  Exam reveals no gallop, no S3, no S4 and no friction rub.    No murmur heard.  Pulmonary/Chest: Breath sounds normal. No respiratory distress. She has no wheezes. She has no rales.   Abdominal: Soft. Bowel sounds are normal. She exhibits no mass. There is no tenderness.   Musculoskeletal: She exhibits no edema.   Neurological: She is alert and " oriented to person, place, and time. No cranial nerve deficit.   Skin: Skin is warm and dry.   Psychiatric: She has a normal mood and affect.       Lab Results   Component Value Date     2017    K 4.4 2017     2017    CO2 30.6 2017    BUN 29 (H) 2017    CREATININE 0.82 2017    GLUCOSE 88 2017    CALCIUM 9.8 2017    AST 33 (H) 2015    ALT 30 2015    ALKPHOS 82 2015    LABIL2 2.0 2015     No results found for: CKTOTAL  Lab Results   Component Value Date    WBC 6.08 2016    HGB 13.1 2016    HCT 38.6 2016     2016     Lab Results   Component Value Date    INR 1.00 2016       Kerri Kinney   Regadenoson Stress Test With Myocardial Perfusion SPECT (Multi Study)   Order# [id number]   Reading physician:   Krish Fisher MD Ordering physician:   Krish Fisher MD Study date: 16   Patient Information   Patient Name MRN Sex  (Age)   Kerri Kinney [id number] Female 1945 (72 y.o.)   Interpretation Summary   · Left ventricular ejection fraction is severely reduced (Calculated EF = 34%). with a large area of anteroapical dyskinesis.  · Myocardial perfusion imaging indicates a large-sized infarct located in the anterior wall and apex with no significant ischemia noted.  · Impressions are consistent with an intermediate risk study.  · the current study reveals no changes.             Echo   No results found for: ECHOEFEST    ECG 12 Lead  Date/Time: 2018 1:52 PM  Performed by: KRISH FISHER  Authorized by: KRISH FISHER               Assessment/Plan    Diagnosis Plan   1.  ASCVD, S/P CABG      2. Ischemic cardiomyopathy with LV ejection fraction of 24-30% on echo in 2015 with NYHA class 2-3 dyspnea.     3. Precordial pain.      4. Ischemic cardiomyopathy, Compensated with class III dyspnea.      5. Stage 3 chronic kidney disease.         Recommendations:    1. Continue with  aspirin, Plavix, atorvastatin, carvedilol and Entresto at the same doses.  2. Not able to increase the doses due to her baseline low blood pressure.  3. We'll evaluate her chest pains with a Lexiscan sestamibi study and her dyspnea with an Echo Doppler study.  4. Follow-up in 3-4 months.    Return in about 3 months (around 5/1/2018) for or sooner if needed.    As always, I appreciate very much the opportunity to participate in the cardiovascular care of your patients.      With Best Regards,    Krish Ocasio MD, [facility]    Dragon disclaimer:  Much of this encounter note is an electronic transcription/translation of spoken language to printed text. The electronic translation of spoken language may permit erroneous, or at times, nonsensical words or phrases to be inadvertently transcribed; Although I have reviewed the note for such errors, some may still exist.

## 2018-02-14 ENCOUNTER — HOSPITAL ENCOUNTER (OUTPATIENT)
Dept: CARDIOLOGY | Facility: HOSPITAL | Age: 73
Discharge: HOME | End: 2018-02-14
Attending: INTERNAL MEDICINE

## 2018-02-14 ENCOUNTER — HOSPITAL ENCOUNTER (OUTPATIENT)
Dept: NUCLEAR MEDICINE | Facility: HOSPITAL | Age: 73
Discharge: HOME | End: 2018-02-14
Attending: INTERNAL MEDICINE

## 2018-02-14 DIAGNOSIS — R07.2 PRECORDIAL PAIN: ICD-10-CM

## 2018-02-14 PROCEDURE — 78452 HT MUSCLE IMAGE SPECT MULT: CPT

## 2018-02-14 PROCEDURE — A9500 TC99M SESTAMIBI: HCPCS | Performed by: INTERNAL MEDICINE

## 2018-02-14 PROCEDURE — 78452 HT MUSCLE IMAGE SPECT MULT: CPT | Performed by: INTERNAL MEDICINE

## 2018-02-14 PROCEDURE — 25010000002 REGADENOSON 0.4 MG/5ML SOLUTION: Performed by: INTERNAL MEDICINE

## 2018-02-14 PROCEDURE — 93306 TTE W/DOPPLER COMPLETE: CPT

## 2018-02-14 PROCEDURE — 0 TECHNETIUM SESTAMIBI: Performed by: INTERNAL MEDICINE

## 2018-02-14 PROCEDURE — 93018 CV STRESS TEST I&R ONLY: CPT | Performed by: INTERNAL MEDICINE

## 2018-02-14 PROCEDURE — 93017 CV STRESS TEST TRACING ONLY: CPT

## 2018-02-14 PROCEDURE — 93306 TTE W/DOPPLER COMPLETE: CPT | Performed by: INTERNAL MEDICINE

## 2018-02-14 RX ADMIN — TECHNETIUM TC 99M SESTAMIBI 1 DOSE: 1 INJECTION INTRAVENOUS at 08:50

## 2018-02-14 RX ADMIN — REGADENOSON 0.4 MG: 0.08 INJECTION, SOLUTION INTRAVENOUS at 10:30

## 2018-02-14 RX ADMIN — TECHNETIUM TC 99M SESTAMIBI 1 DOSE: 1 INJECTION INTRAVENOUS at 10:30

## 2018-02-15 ENCOUNTER — APPOINTMENT (OUTPATIENT)
Dept: NUCLEAR MEDICINE | Facility: HOSPITAL | Age: 73
End: 2018-02-15
Attending: INTERNAL MEDICINE

## 2018-02-15 ENCOUNTER — APPOINTMENT (OUTPATIENT)
Dept: CARDIOLOGY | Facility: HOSPITAL | Age: 73
End: 2018-02-15
Attending: INTERNAL MEDICINE

## 2018-02-15 LAB
BH CV NUCLEAR PRIOR STUDY: 3
BH CV STRESS BP STAGE 1: NORMAL
BH CV STRESS BP STAGE 2: NORMAL
BH CV STRESS COMMENTS STAGE 1: NORMAL
BH CV STRESS COMMENTS STAGE 2: NORMAL
BH CV STRESS DOSE REGADENOSON STAGE 1: 0.4
BH CV STRESS DURATION MIN STAGE 1: 0
BH CV STRESS DURATION MIN STAGE 2: 4
BH CV STRESS DURATION SEC STAGE 1: 15
BH CV STRESS DURATION SEC STAGE 2: 0
BH CV STRESS HR STAGE 1: 90
BH CV STRESS HR STAGE 2: 81
BH CV STRESS PROTOCOL 1: NORMAL
BH CV STRESS RECOVERY BP: NORMAL MMHG
BH CV STRESS RECOVERY HR: 81 BPM
BH CV STRESS STAGE 1: 1
BH CV STRESS STAGE 2: 2
LV EF NUC BP: 35 %
MAXIMAL PREDICTED HEART RATE: 149 BPM
PERCENT MAX PREDICTED HR: 60.4 %
STRESS BASELINE BP: NORMAL MMHG
STRESS BASELINE HR: 73 BPM
STRESS PERCENT HR: 71 %
STRESS POST PEAK BP: NORMAL MMHG
STRESS POST PEAK HR: 90 BPM
STRESS TARGET HR: 127 BPM

## 2018-02-22 LAB
BH CV ECHO MEAS - % IVS THICK: 8.3 %
BH CV ECHO MEAS - % LVPW THICK: 85 %
BH CV ECHO MEAS - ACS: 1.3 CM
BH CV ECHO MEAS - AI DEC SLOPE: 325.6 CM/SEC^2
BH CV ECHO MEAS - AI MAX PG: 72.5 MMHG
BH CV ECHO MEAS - AI MAX VEL: 425.7 CM/SEC
BH CV ECHO MEAS - AI P1/2T: 383 MSEC
BH CV ECHO MEAS - AO MAX PG (FULL): 14.2 MMHG
BH CV ECHO MEAS - AO MAX PG: 19.8 MMHG
BH CV ECHO MEAS - AO MEAN PG (FULL): 9.2 MMHG
BH CV ECHO MEAS - AO MEAN PG: 12.4 MMHG
BH CV ECHO MEAS - AO ROOT AREA (BSA CORRECTED): 2.1
BH CV ECHO MEAS - AO ROOT AREA: 7.6 CM^2
BH CV ECHO MEAS - AO ROOT DIAM: 3.1 CM
BH CV ECHO MEAS - AO V2 MAX: 222.6 CM/SEC
BH CV ECHO MEAS - AO V2 MEAN: 168.1 CM/SEC
BH CV ECHO MEAS - AO V2 VTI: 54.7 CM
BH CV ECHO MEAS - AVA(I,A): 1.3 CM^2
BH CV ECHO MEAS - AVA(I,D): 1.3 CM^2
BH CV ECHO MEAS - AVA(V,A): 1.3 CM^2
BH CV ECHO MEAS - AVA(V,D): 1.3 CM^2
BH CV ECHO MEAS - BSA(HAYCOCK): 1.6 M^2
BH CV ECHO MEAS - BSA: 1.5 M^2
BH CV ECHO MEAS - BZI_BMI: 25.4 KILOGRAMS/M^2
BH CV ECHO MEAS - BZI_METRIC_HEIGHT: 149.9 CM
BH CV ECHO MEAS - BZI_METRIC_WEIGHT: 57.2 KG
BH CV ECHO MEAS - CONTRAST EF 4CH: 33.9 ML/M^2
BH CV ECHO MEAS - EDV(CUBED): 105.1 ML
BH CV ECHO MEAS - EDV(MOD-SP4): 62 ML
BH CV ECHO MEAS - EDV(TEICH): 103.3 ML
BH CV ECHO MEAS - EF(CUBED): 58.9 %
BH CV ECHO MEAS - EF(MOD-SP4): 33.9 %
BH CV ECHO MEAS - EF(TEICH): 50.5 %
BH CV ECHO MEAS - ESV(CUBED): 43.2 ML
BH CV ECHO MEAS - ESV(MOD-SP4): 41 ML
BH CV ECHO MEAS - ESV(TEICH): 51.2 ML
BH CV ECHO MEAS - FS: 25.6 %
BH CV ECHO MEAS - IVS/LVPW: 0.6
BH CV ECHO MEAS - IVSD: 0.48 CM
BH CV ECHO MEAS - IVSS: 0.52 CM
BH CV ECHO MEAS - LA DIMENSION: 3.4 CM
BH CV ECHO MEAS - LA/AO: 1.1
BH CV ECHO MEAS - LV DIASTOLIC VOL/BSA (35-75): 40.9 ML/M^2
BH CV ECHO MEAS - LV MASS(C)D: 93.7 GRAMS
BH CV ECHO MEAS - LV MASS(C)DI: 61.9 GRAMS/M^2
BH CV ECHO MEAS - LV MASS(C)S: 105 GRAMS
BH CV ECHO MEAS - LV MASS(C)SI: 69.3 GRAMS/M^2
BH CV ECHO MEAS - LV MAX PG: 5.6 MMHG
BH CV ECHO MEAS - LV MEAN PG: 3.2 MMHG
BH CV ECHO MEAS - LV SYSTOLIC VOL/BSA (12-30): 27.1 ML/M^2
BH CV ECHO MEAS - LV V1 MAX: 118.2 CM/SEC
BH CV ECHO MEAS - LV V1 MEAN: 83 CM/SEC
BH CV ECHO MEAS - LV V1 VTI: 27.9 CM
BH CV ECHO MEAS - LVIDD: 5.7 CM
BH CV ECHO MEAS - LVIDS: 3.5 CM
BH CV ECHO MEAS - LVLD AP4: 7.7 CM
BH CV ECHO MEAS - LVLS AP4: 7.1 CM
BH CV ECHO MEAS - LVOT AREA (M): 2.5 CM^2
BH CV ECHO MEAS - LVOT AREA: 2.5 CM^2
BH CV ECHO MEAS - LVOT DIAM: 1.8 CM
BH CV ECHO MEAS - LVPWD: 0.81 CM
BH CV ECHO MEAS - LVPWS: 1.5 CM
BH CV ECHO MEAS - MV A MAX VEL: 135.7 CM/SEC
BH CV ECHO MEAS - MV E MAX VEL: 79 CM/SEC
BH CV ECHO MEAS - MV E/A: 0.58
BH CV ECHO MEAS - PA ACC SLOPE: 1349 CM/SEC^2
BH CV ECHO MEAS - PA ACC TIME: 0.07 SEC
BH CV ECHO MEAS - PA PR(ACCEL): 47.3 MMHG
BH CV ECHO MEAS - RAP SYSTOLE: 10 MMHG
BH CV ECHO MEAS - RVDD: 1.3 CM
BH CV ECHO MEAS - RVSP: 46.5 MMHG
BH CV ECHO MEAS - SI(AO): 274.4 ML/M^2
BH CV ECHO MEAS - SI(CUBED): 40.8 ML/M^2
BH CV ECHO MEAS - SI(LVOT): 46.3 ML/M^2
BH CV ECHO MEAS - SI(MOD-SP4): 13.9 ML/M^2
BH CV ECHO MEAS - SI(TEICH): 34.4 ML/M^2
BH CV ECHO MEAS - SV(AO): 415.7 ML
BH CV ECHO MEAS - SV(CUBED): 61.9 ML
BH CV ECHO MEAS - SV(LVOT): 70.1 ML
BH CV ECHO MEAS - SV(MOD-SP4): 21 ML
BH CV ECHO MEAS - SV(TEICH): 52.1 ML
BH CV ECHO MEAS - TR MAX VEL: 301.9 CM/SEC
MAXIMAL PREDICTED HEART RATE: 149 BPM
STRESS TARGET HR: 127 BPM

## 2018-02-26 RX ORDER — SACUBITRIL AND VALSARTAN 24; 26 MG/1; MG/1
TABLET, FILM COATED ORAL
Qty: 120 TABLET | Refills: 3 | Status: SHIPPED | OUTPATIENT
Start: 2018-02-26 | End: 2018-11-10 | Stop reason: SDUPTHER

## 2018-05-16 ENCOUNTER — OFFICE VISIT (OUTPATIENT)
Dept: CARDIOLOGY | Facility: CLINIC | Age: 73
End: 2018-05-16

## 2018-05-16 VITALS
DIASTOLIC BLOOD PRESSURE: 73 MMHG | HEIGHT: 59 IN | HEART RATE: 82 BPM | WEIGHT: 128.8 LBS | SYSTOLIC BLOOD PRESSURE: 125 MMHG | BODY MASS INDEX: 25.96 KG/M2

## 2018-05-16 DIAGNOSIS — I25.5 ISCHEMIC CARDIOMYOPATHY: ICD-10-CM

## 2018-05-16 DIAGNOSIS — I25.10 ASCVD (ARTERIOSCLEROTIC CARDIOVASCULAR DISEASE): Primary | ICD-10-CM

## 2018-05-16 DIAGNOSIS — N18.30 STAGE 3 CHRONIC KIDNEY DISEASE (HCC): ICD-10-CM

## 2018-05-16 PROCEDURE — 99214 OFFICE O/P EST MOD 30 MIN: CPT | Performed by: INTERNAL MEDICINE

## 2018-05-16 NOTE — PROGRESS NOTES
Samuel Duane Kreis, MD  Kerri Kinney  1945 05/16/2018    Patient Active Problem List   Diagnosis   •  ASCVD, S/P CABG 1997   • CKD (chronic kidney disease) (stage III)   •  S/P ICD with recent gen change 2009   • Preop cardiovascular exam   • History of chronic class II to III congestive heart failure   • Coronary artery disease   • Hypertension   • Depression   • Anxiety   • Hyperlipidemia   • GERD (gastroesophageal reflux disease)   • Dizziness   • Ischemic cardiomyopathy with LV ejection fraction of 30-35% in feb 2018 with NYHA class 2-3 dyspnea.   • Precordial pain       Dear Samuel Duane Kreis, MD:    Subjective     Chief complaint: Follow-up of ASCVD and ischemic cardiomyopathy.    History of Present Illness:Ms. Kinney is a pleasant 72-year-old  female with history of known ASCVD, status post previous myocardial infarctions, status post CABG 1997 with underlying advanced ischemic cardiomyopathy, status post ICD implantation with generator change in 2009,  is here for a regular cardiology follow-up.  She denies any further episodes of significant chest pains since the last visit. Recent nuclear stress test revealed no evidence of myocardial ischemia.      Allergies   Allergen Reactions   • Codeine    • Demerol [Meperidine]    • Penicillins    • Terramycin [Oxytetracycline]    :      Current Outpatient Prescriptions:   •  acetaminophen (TYLENOL) 100 MG/ML solution, Take 10 mg/kg by mouth every 4 (four) hours as needed for fever., Disp: , Rfl:   •  alendronate (FOSAMAX) 70 MG tablet, Take 70 mg by mouth every 7 days., Disp: , Rfl:   •  allopurinol (ZYLOPRIM) 100 MG tablet, Take 100 mg by mouth daily., Disp: , Rfl:   •  aspirin 81 MG EC tablet, Take 81 mg by mouth daily., Disp: , Rfl:   •  atorvastatin (LIPITOR) 40 MG tablet, Take 40 mg by mouth daily., Disp: , Rfl:   •  carvedilol (COREG) 6.25 MG tablet, Take 2 tablets by mouth 2 (Two) Times a Day With Meals., Disp: 120 tablet, Rfl: 3  •   clonazePAM (KlonoPIN) 0.5 MG tablet, Take 0.5 mg by mouth 2 (two) times a day as needed for seizures., Disp: , Rfl:   •  clopidogrel (PLAVIX) 75 MG tablet, Take 1 tablet by mouth Daily., Disp: 90 tablet, Rfl: 0  •  dexlansoprazole (DEXILANT) 60 MG capsule, Take 60 mg by mouth daily., Disp: , Rfl:   •  diclofenac (VOLTAREN) 1 % gel gel, Apply 4 g topically 4 (Four) Times a Day As Needed., Disp: , Rfl:   •  DULoxetine (CYMBALTA) 60 MG capsule, Take 60 mg by mouth daily., Disp: , Rfl:   •  ENTRESTO 24-26 MG tablet, TAKE ONE TABLET BY MOUTH TWICE A DAY, Disp: 120 tablet, Rfl: 3  •  fenofibrate (TRICOR) 48 MG tablet, Take 48 mg by mouth daily., Disp: , Rfl:   •  furosemide (LASIX) 40 MG tablet, Take 40 mg by mouth As Needed., Disp: , Rfl:   •  gabapentin (NEURONTIN) 300 MG capsule, Take 300 mg by mouth 3 (three) times a day., Disp: , Rfl:   •  Garlic 1000 MG capsule, Take 1,000 mg by mouth Daily., Disp: , Rfl:   •  HYDROcodone-acetaminophen (NORCO) 5-325 MG per tablet, Take 1 tablet by mouth every 6 (six) hours as needed., Disp: , Rfl:   •  imipramine (TOFRANIL) 10 MG tablet, Take 10 mg by mouth 4 (Four) Times a Day., Disp: , Rfl:   •  isosorbide mononitrate (IMDUR) 30 MG 24 hr tablet, TAKE ONE TABLET BY MOUTH DAILY, Disp: 90 tablet, Rfl: 0  •  nitroglycerin (NITROSTAT) 0.4 MG SL tablet, Place 0.4 mg under the tongue every 5 (five) minutes as needed for chest pain. Take no more than 3 doses in 15 minutes., Disp: , Rfl:   •  nitroglycerin (NITROSTAT) 0.4 MG SL tablet, 1 under the tongue as needed for angina, may repeat q5mins for up three doses, Disp: 25 tablet, Rfl: 3  •  Omega-3 Fatty Acids (FISH OIL) 1000 MG capsule capsule, Take  by mouth daily with breakfast., Disp: , Rfl:   •  tiZANidine (ZANAFLEX) 4 MG tablet, Take 4 mg by mouth at night as needed for muscle spasms., Disp: , Rfl:       The following portions of the patient's history were reviewed and updated as appropriate: allergies, current medications, past  "family history, past medical history, past social history, past surgical history and problem list.    Social History   Substance Use Topics   • Smoking status: Former Smoker     Packs/day: 2.00     Types: Cigarettes     Quit date: 1997   • Smokeless tobacco: Never Used   • Alcohol use No       Review of Systems   Constitution: Negative for chills and fever.   HENT: Negative for nosebleeds and sore throat.    Cardiovascular: Positive for chest pain (same). Leg swelling: sometimes.   Respiratory: Positive for shortness of breath. Negative for cough, hemoptysis and wheezing.    Gastrointestinal: Negative for abdominal pain, hematemesis, hematochezia, melena, nausea and vomiting.   Genitourinary: Negative for dysuria and hematuria.   Neurological: Negative for headaches.       Objective   Vitals:    05/16/18 1331   BP: 125/73   BP Location: Right arm   Patient Position: Sitting   Cuff Size: Adult   Pulse: 82   Weight: 58.4 kg (128 lb 12.8 oz)   Height: 149.9 cm (59\")     Body mass index is 26.01 kg/m².    Physical Exam   Constitutional: She is oriented to person, place, and time. She appears well-developed and well-nourished.   HENT:   Head: Normocephalic.   Mouth/Throat: Oropharynx is clear and moist.   Eyes: Conjunctivae and EOM are normal. Pupils are equal, round, and reactive to light.   Neck: Normal range of motion. Neck supple. No JVD present. No tracheal deviation present. No thyromegaly present.   Cardiovascular: Normal rate, regular rhythm, S1 normal and S2 normal.  Exam reveals no gallop, no S3, no S4 and no friction rub.    Murmur heard.   Systolic murmur is present with a grade of 2/6  at the lower left sternal border  Pulmonary/Chest: Effort normal and breath sounds normal. No respiratory distress. She has no wheezes. She has no rales.   Abdominal: Soft. Bowel sounds are normal. She exhibits no mass. There is no tenderness.   Musculoskeletal: Normal range of motion. She exhibits no edema.   Lymphadenopathy: "     She has no cervical adenopathy.   Neurological: She is alert and oriented to person, place, and time. No cranial nerve deficit.   Skin: Skin is warm and dry. No rash noted.   Psychiatric: She has a normal mood and affect.       Lab Results   Component Value Date     2017    K 4.4 2017     2017    CO2 30.6 2017    BUN 29 (H) 2017    CREATININE 0.82 2017    GLUCOSE 88 2017    CALCIUM 9.8 2017    AST 33 (H) 2015    ALT 30 2015    ALKPHOS 82 2015    LABIL2 2.0 2015     No results found for: CKTOTAL  Lab Results   Component Value Date    WBC 6.08 2016    HGB 13.1 2016    HCT 38.6 2016     2016     Lab Results   Component Value Date    INR 1.00 2016     Kerri Kinney   Regadenoson Stress Test With Myocardial Perfusion SPECT (Multi Study)   Reading physician: Krish Ocasio MD Ordering physician: Krish Ocasio MD Study date: 18   Patient Information     Patient Name  Kerri Kinney MRN  [id number] Sex  Female  (Age)  1945 (72 y.o.)   Interpretation Summary     · Myocardial perfusion imaging indicates a medium-to-large-sized infarct located in the anterior wall, septal wall and apex with no significant ischemia noted.  · Abnormal LV wall motion consistent with dyskinesis. Apical dyskinesis..  · Left ventricular ejection fraction is moderately reduced (Calculated EF = 35%).  · Impressions are consistent with an intermediate risk study.        Kerri LUO Kinney   Echocardiogram   Reading physician: Krish Ocasio MD Ordering physician: Krish Ocasio MD Study date: 18     Patient Name  Kerri Kinney MRN  [id number] Sex  Female  (Age)  1945 (72 y.o.)   Interpretation Summary     · The left ventricular cavity is mildly dilated.  · Estimated EF appears to be in the range of 31 - 35%.  · The aortic valve is abnormal in structure. There is severe calcification of the aortic  valve mainly affecting the non, left and right coronary cusp(s).Mild to moderate aortic valve regurgitation is present. Moderate aortic valve stenosis is present.  · The mitral valve is abnormal in structure. Mitral annular calcification is present. Mild mitral valve regurgitation is present. Mild mitral valve stenosis is present.  · The tricuspid valve is abnormal. Mild tricuspid valve regurgitation is present. Estimated right ventricular systolic pressure from tricuspid regurgitation is moderately elevated (45-55 mmHg).  · Moderate pulmonary hypertension is present.  · There is no evidence of pericardial effusion.     Procedures    Assessment/Plan :    1.  ASCVD, S/P CABG 1997, Clinically stable.      2. Ischemic cardiomyopathy with LV ejection fraction of 30-35% in feb 2018 with NYHA class 2-3 dyspnea.     3. Stage 3 chronic kidney disease        Recommendations:  1. Continue with carvedilol, Entresto, aspirin, atorvastatin and Imdur at the same dose.  2. I have reviewed the nuclear stress test and echo Doppler study results with the patient.  3. Follow-up in 3-4 months.      Return in about 3 months (around 8/16/2018) for or sooner if needed.    As always, I appreciate very much the opportunity to participate in the cardiovascular care of your patients.      With Best Regards,    Krish Ocasio MD, [facility]    Dragon disclaimer:  Much of this encounter note is an electronic transcription/translation of spoken language to printed text. The electronic translation of spoken language may permit erroneous, or at times, nonsensical words or phrases to be inadvertently transcribed; Although I have reviewed the note for such errors, some may still exist.

## 2018-08-13 ENCOUNTER — OFFICE VISIT (OUTPATIENT)
Dept: CARDIOLOGY | Facility: CLINIC | Age: 73
End: 2018-08-13

## 2018-08-13 VITALS
HEART RATE: 84 BPM | WEIGHT: 130.2 LBS | SYSTOLIC BLOOD PRESSURE: 113 MMHG | BODY MASS INDEX: 26.25 KG/M2 | HEIGHT: 59 IN | OXYGEN SATURATION: 94 % | DIASTOLIC BLOOD PRESSURE: 68 MMHG

## 2018-08-13 DIAGNOSIS — I10 ESSENTIAL HYPERTENSION: ICD-10-CM

## 2018-08-13 DIAGNOSIS — I25.10 ASCVD (ARTERIOSCLEROTIC CARDIOVASCULAR DISEASE): Primary | ICD-10-CM

## 2018-08-13 DIAGNOSIS — E78.2 MIXED HYPERLIPIDEMIA: ICD-10-CM

## 2018-08-13 DIAGNOSIS — N18.30 STAGE 3 CHRONIC KIDNEY DISEASE (HCC): ICD-10-CM

## 2018-08-13 DIAGNOSIS — I25.5 ISCHEMIC CARDIOMYOPATHY: ICD-10-CM

## 2018-08-13 PROCEDURE — 99213 OFFICE O/P EST LOW 20 MIN: CPT | Performed by: INTERNAL MEDICINE

## 2018-08-13 PROCEDURE — 93000 ELECTROCARDIOGRAM COMPLETE: CPT | Performed by: INTERNAL MEDICINE

## 2018-08-13 RX ORDER — CARVEDILOL 12.5 MG/1
12.5 TABLET ORAL 2 TIMES DAILY WITH MEALS
COMMUNITY
End: 2019-01-17

## 2018-08-13 RX ORDER — HYDROCODONE BITARTRATE AND ACETAMINOPHEN 5; 325 MG/1; MG/1
1 TABLET ORAL EVERY 6 HOURS PRN
COMMUNITY

## 2018-08-13 NOTE — PROGRESS NOTES
Kreis, Samuel Duane, MD  Kerri Kinney  1945 08/13/2018    Patient Active Problem List   Diagnosis   •  ASCVD, S/P CABG 1997   • CKD (chronic kidney disease) (stage III)   •  S/P ICD with recent gen change 2009   • Preop cardiovascular exam   • History of chronic class II to III congestive heart failure   • Coronary artery disease   • Hypertension   • Depression   • Anxiety   • Hyperlipidemia   • GERD (gastroesophageal reflux disease)   • Dizziness   • Ischemic cardiomyopathy with LV ejection fraction of 30-35% in feb 2018 with NYHA class 2-3 dyspnea.   • Precordial pain       Dear Kreis, Samuel Duane, MD:    Subjective     Kerri Kinney is a 73 y.o. female with the problems as listed above, presents    Complaint: Follow-up of ASCVD and ischemic cardiomyopathy.    History of Present Illness: Ms. Kinney is a pleasant 73-year-old  female with history of known ASCVD, status post previous myocardial infarctions with CABG 1997 with underlying advanced ischemic cardiomyopathy status post ICD implantation.  She was recently noted to have malfunctioning of one of her pacemaker leads for which she was sent to [facility] and had a new lead placed.  She says she is breathing much better since then.  She denies any complaints of chest pains.    Allergies   Allergen Reactions   • Codeine    • Demerol [Meperidine]    • Penicillins    • Terramycin [Oxytetracycline]    :      Current Outpatient Prescriptions:   •  acetaminophen (TYLENOL) 100 MG/ML solution, Take 10 mg/kg by mouth every 4 (four) hours as needed for fever., Disp: , Rfl:   •  alendronate (FOSAMAX) 70 MG tablet, Take 70 mg by mouth every 7 days., Disp: , Rfl:   •  allopurinol (ZYLOPRIM) 100 MG tablet, Take 100 mg by mouth daily., Disp: , Rfl:   •  aspirin 81 MG EC tablet, Take 81 mg by mouth daily., Disp: , Rfl:   •  atorvastatin (LIPITOR) 40 MG tablet, Take 40 mg by mouth daily., Disp: , Rfl:   •  carvedilol (COREG) 12.5 MG tablet, Take 12.5  mg by mouth 2 (Two) Times a Day With Meals., Disp: , Rfl:   •  clonazePAM (KlonoPIN) 0.5 MG tablet, Take 0.5 mg by mouth 2 (two) times a day as needed for seizures., Disp: , Rfl:   •  clopidogrel (PLAVIX) 75 MG tablet, Take 1 tablet by mouth Daily., Disp: 90 tablet, Rfl: 0  •  dexlansoprazole (DEXILANT) 60 MG capsule, Take 60 mg by mouth daily., Disp: , Rfl:   •  diclofenac (VOLTAREN) 1 % gel gel, Apply 4 g topically 4 (Four) Times a Day As Needed., Disp: , Rfl:   •  DULoxetine (CYMBALTA) 60 MG capsule, Take 60 mg by mouth daily., Disp: , Rfl:   •  ENTRESTO 24-26 MG tablet, TAKE ONE TABLET BY MOUTH TWICE A DAY, Disp: 120 tablet, Rfl: 3  •  furosemide (LASIX) 40 MG tablet, Take 40 mg by mouth As Needed., Disp: , Rfl:   •  gabapentin (NEURONTIN) 300 MG capsule, Take 300 mg by mouth 3 (three) times a day., Disp: , Rfl:   •  Garlic 1000 MG capsule, Take 1,000 mg by mouth Daily., Disp: , Rfl:   •  HYDROcodone-acetaminophen (LORTAB) 5-325 MG per tablet, Take 1 tablet by mouth Every 6 (Six) Hours As Needed., Disp: , Rfl:   •  imipramine (TOFRANIL) 10 MG tablet, Take 10 mg by mouth 4 (Four) Times a Day., Disp: , Rfl:   •  isosorbide mononitrate (IMDUR) 30 MG 24 hr tablet, TAKE ONE TABLET BY MOUTH DAILY, Disp: 90 tablet, Rfl: 0  •  nitroglycerin (NITROSTAT) 0.4 MG SL tablet, Place 0.4 mg under the tongue every 5 (five) minutes as needed for chest pain. Take no more than 3 doses in 15 minutes., Disp: , Rfl:   •  nitroglycerin (NITROSTAT) 0.4 MG SL tablet, 1 under the tongue as needed for angina, may repeat q5mins for up three doses, Disp: 25 tablet, Rfl: 3  •  Omega-3 Fatty Acids (FISH OIL) 1000 MG capsule capsule, Take  by mouth daily with breakfast., Disp: , Rfl:   •  tiZANidine (ZANAFLEX) 4 MG tablet, Take 4 mg by mouth at night as needed for muscle spasms., Disp: , Rfl:       The following portions of the patient's history were reviewed and updated as appropriate: allergies, current medications, past family history, past  "medical history, past social history, past surgical history and problem list.    Social History   Substance Use Topics   • Smoking status: Former Smoker     Packs/day: 2.00     Types: Cigarettes     Quit date: 1997   • Smokeless tobacco: Never Used   • Alcohol use No       Review of Systems   Constitution: Negative for chills and fever.   HENT: Negative for nosebleeds and sore throat.    Respiratory: Negative for cough, hemoptysis and wheezing.    Gastrointestinal: Negative for abdominal pain, hematemesis, hematochezia, melena, nausea and vomiting.   Genitourinary: Negative for dysuria and hematuria.   Neurological: Negative for headaches.       Objective   Vitals:    08/13/18 1138   BP: 113/68   BP Location: Left arm   Patient Position: Sitting   Cuff Size: Adult   Pulse: 84   SpO2: 94%   Weight: 59.1 kg (130 lb 3.2 oz)   Height: 149.9 cm (59\")     Body mass index is 26.3 kg/m².        Physical Exam   Constitutional: She is oriented to person, place, and time. She appears well-developed and well-nourished.   HENT:   Mouth/Throat: Oropharynx is clear and moist.   Eyes: Pupils are equal, round, and reactive to light. EOM are normal.   Neck: Neck supple. No JVD present. No tracheal deviation present. No thyromegaly present.   Cardiovascular: Normal rate, regular rhythm, S1 normal and S2 normal.  Exam reveals no gallop and no friction rub.    No murmur heard.  Pulmonary/Chest: Effort normal and breath sounds normal.   Abdominal: Soft. Bowel sounds are normal. She exhibits no mass. There is no tenderness.   Musculoskeletal: Normal range of motion. She exhibits no edema.   Lymphadenopathy:     She has no cervical adenopathy.   Neurological: She is alert and oriented to person, place, and time.   Skin: Skin is warm and dry. No rash noted.   Psychiatric: She has a normal mood and affect.         Lab Results   Component Value Date    INR 1.00 05/31/2016         ECG 12 Lead  Date/Time: 8/13/2018 11:26 AM  Performed by: " KRISH FISHER  Authorized by: KRISH FISHER   Comments: Biventricular pacing rhythm.              Assessment/Plan    Diagnosis Plan   1.  ASCVD with previous myocardial infarctions, S/P CABG 1997, clinically stable.      2. Ischemic cardiomyopathy with LV ejection fraction of 30-35% in feb 2018 with NYHA class 2-3 dyspnea, well compensated.     3. Essential hypertension, controlled     4. Mixed hyperlipidemia     5. Stage 3 chronic kidney disease, stable     6. Status post ICD implantation in the past with recent new ventricular lead placement.           Recommendations:  1. Continue with Entresto, carvedilol, aspirin and Plavix at current doses.  2. Follow up in 5-6 months.    Return in about 5 months (around 1/13/2019).    As always, I appreciate very much the opportunity to participate in the cardiovascular care of your patients.      With Best Regards,    Krish Fisher MD, [facility]    Dragon disclaimer:  Much of this encounter note is an electronic transcription/translation of spoken language to printed text. The electronic translation of spoken language may permit erroneous, or at times, nonsensical words or phrases to be inadvertently transcribed; Although I have reviewed the note for such errors, some may still exist.

## 2018-08-14 RX ORDER — NITROGLYCERIN 0.4 MG/1
TABLET SUBLINGUAL
Qty: 25 TABLET | Refills: 3 | Status: SHIPPED | OUTPATIENT
Start: 2018-08-14 | End: 2019-08-01 | Stop reason: SDUPTHER

## 2018-09-18 RX ORDER — CARVEDILOL 12.5 MG/1
12.5 TABLET ORAL 2 TIMES DAILY
Qty: 180 TABLET | Refills: 1 | Status: SHIPPED | OUTPATIENT
Start: 2018-09-18 | End: 2019-04-17 | Stop reason: SDUPTHER

## 2018-11-12 RX ORDER — SACUBITRIL AND VALSARTAN 24; 26 MG/1; MG/1
TABLET, FILM COATED ORAL
Qty: 120 TABLET | Refills: 2 | Status: SHIPPED | OUTPATIENT
Start: 2018-11-12 | End: 2019-05-29 | Stop reason: SDUPTHER

## 2019-01-17 ENCOUNTER — OFFICE VISIT (OUTPATIENT)
Dept: CARDIOLOGY | Facility: CLINIC | Age: 74
End: 2019-01-17

## 2019-01-17 VITALS
BODY MASS INDEX: 26.41 KG/M2 | OXYGEN SATURATION: 95 % | HEIGHT: 59 IN | SYSTOLIC BLOOD PRESSURE: 111 MMHG | DIASTOLIC BLOOD PRESSURE: 63 MMHG | HEART RATE: 74 BPM | WEIGHT: 131 LBS

## 2019-01-17 DIAGNOSIS — I25.5 ISCHEMIC CARDIOMYOPATHY: ICD-10-CM

## 2019-01-17 DIAGNOSIS — I50.22 CHRONIC SYSTOLIC CONGESTIVE HEART FAILURE (HCC): ICD-10-CM

## 2019-01-17 DIAGNOSIS — I25.10 ASCVD (ARTERIOSCLEROTIC CARDIOVASCULAR DISEASE): Primary | ICD-10-CM

## 2019-01-17 DIAGNOSIS — R07.2 PRECORDIAL PAIN: ICD-10-CM

## 2019-01-17 PROCEDURE — 99214 OFFICE O/P EST MOD 30 MIN: CPT | Performed by: INTERNAL MEDICINE

## 2019-01-17 RX ORDER — ISOSORBIDE MONONITRATE 60 MG/1
60 TABLET, EXTENDED RELEASE ORAL DAILY
Qty: 30 TABLET | Refills: 5 | Status: SHIPPED | OUTPATIENT
Start: 2019-01-17 | End: 2019-07-16 | Stop reason: SDUPTHER

## 2019-01-17 NOTE — PROGRESS NOTES
Kreis, Samuel Duane, MD  Kerri Kinney  1945 01/17/2019    Patient Active Problem List   Diagnosis   •  ASCVD, S/P CABG 1997   • CKD (chronic kidney disease) (stage III)   •  S/P ICD with recent gen change 2009   • Preop cardiovascular exam   • History of chronic class II to III congestive heart failure   • Coronary artery disease   • Hypertension   • Depression   • Anxiety   • Hyperlipidemia   • GERD (gastroesophageal reflux disease)   • Dizziness   • Ischemic cardiomyopathy with LV ejection fraction of 30-35% in feb 2018 with NYHA class 2-3 dyspnea.   • Precordial pain       Dear Kreis, Samuel Duane, MD:    Subjective     Kerri Kinney is a 73 y.o. female with the problems as listed above, presents    Chief complaint: Follow-up of coronary artery disease with recent chest pains.    History of Present Illness: Ms. Kinney is a very pleasant 73-year-old  female with history of known ASCVD, status post previous myocardial infarctions, status post CABG in 1997, with underlying advanced ischemic cardiomyopathy, status post ICD implantation, presents of with complaints of having some chest pains around [place] when she apparently was stressed out due to some family situation.  She has had some intermittent chest pain since then.  She has also been more short of breath lately.  She's gained 1 pound since August 2018. She describes chest pains as being felt as pressure type of sensation on the left upper part of her chest associated with some shortness of breath but no sweating.  There were moderate intensity and would relieve spontaneously.    Allergies   Allergen Reactions   • Codeine    • Demerol [Meperidine]    • Penicillins    • Terramycin [Oxytetracycline]        Current Outpatient Medications:   •  acetaminophen (TYLENOL) 100 MG/ML solution, Take 10 mg/kg by mouth every 4 (four) hours as needed for fever., Disp: , Rfl:   •  allopurinol (ZYLOPRIM) 100 MG tablet, Take 100 mg by mouth daily., Disp:  , Rfl:   •  aspirin 81 MG EC tablet, Take 81 mg by mouth daily., Disp: , Rfl:   •  atorvastatin (LIPITOR) 40 MG tablet, Take 40 mg by mouth daily., Disp: , Rfl:   •  carvedilol (COREG) 12.5 MG tablet, Take 1 tablet by mouth 2 (Two) Times a Day., Disp: 180 tablet, Rfl: 1  •  clonazePAM (KlonoPIN) 0.5 MG tablet, Take 0.5 mg by mouth 2 (two) times a day as needed for seizures., Disp: , Rfl:   •  clopidogrel (PLAVIX) 75 MG tablet, Take 1 tablet by mouth Daily., Disp: 90 tablet, Rfl: 0  •  dexlansoprazole (DEXILANT) 60 MG capsule, Take 60 mg by mouth daily., Disp: , Rfl:   •  diclofenac (VOLTAREN) 1 % gel gel, Apply 4 g topically 4 (Four) Times a Day As Needed., Disp: , Rfl:   •  DULoxetine (CYMBALTA) 60 MG capsule, Take 60 mg by mouth daily., Disp: , Rfl:   •  ENTRESTO 24-26 MG tablet, TAKE ONE TABLET BY MOUTH TWICE A DAY, Disp: 120 tablet, Rfl: 2  •  furosemide (LASIX) 40 MG tablet, Take 40 mg by mouth As Needed., Disp: , Rfl:   •  gabapentin (NEURONTIN) 300 MG capsule, Take 300 mg by mouth 3 (three) times a day., Disp: , Rfl:   •  Garlic 1000 MG capsule, Take 1,000 mg by mouth Daily., Disp: , Rfl:   •  HYDROcodone-acetaminophen (LORTAB) 5-325 MG per tablet, Take 1 tablet by mouth Every 6 (Six) Hours As Needed., Disp: , Rfl:   •  imipramine (TOFRANIL) 10 MG tablet, Take 10 mg by mouth 4 (Four) Times a Day., Disp: , Rfl:   •  isosorbide mononitrate (IMDUR) 60 MG 24 hr tablet, Take 1 tablet by mouth Daily., Disp: 30 tablet, Rfl: 5  •  nitroglycerin (NITROSTAT) 0.4 MG SL tablet, Place 0.4 mg under the tongue every 5 (five) minutes as needed for chest pain. Take no more than 3 doses in 15 minutes., Disp: , Rfl:   •  nitroglycerin (NITROSTAT) 0.4 MG SL tablet, 1 under the tongue as needed for angina, may repeat q5mins for up three doses, Disp: 25 tablet, Rfl: 3  •  Omega-3 Fatty Acids (FISH OIL) 1000 MG capsule capsule, Take  by mouth daily with breakfast., Disp: , Rfl:   •  tiZANidine (ZANAFLEX) 4 MG tablet, Take 4 mg by  "mouth at night as needed for muscle spasms., Disp: , Rfl:       The following portions of the patient's history were reviewed and updated as appropriate: allergies, current medications, past family history, past medical history, past social history, past surgical history and problem list.    Social History     Tobacco Use   • Smoking status: Former Smoker     Packs/day: 2.00     Types: Cigarettes     Last attempt to quit:      Years since quittin.0   • Smokeless tobacco: Never Used   Substance Use Topics   • Alcohol use: No   • Drug use: No       Review of Systems   Cardiovascular: Positive for chest pain (left sided ), dyspnea on exertion and leg swelling (right foot). Negative for irregular heartbeat, near-syncope and palpitations.   Respiratory: Positive for shortness of breath.        Objective   Vitals:    19 1210   BP: 111/63   BP Location: Right arm   Patient Position: Sitting   Cuff Size: Adult   Pulse: 74   SpO2: 95%   Weight: 59.4 kg (131 lb)   Height: 149.9 cm (59\")     Body mass index is 26.46 kg/m².        Physical Exam   Constitutional: She is oriented to person, place, and time. She appears well-developed and well-nourished.   HENT:   Mouth/Throat: Oropharynx is clear and moist.   Eyes: EOM are normal. Pupils are equal, round, and reactive to light.   Neck: Neck supple. No JVD present. No tracheal deviation present. No thyromegaly present.   Cardiovascular: Normal rate, regular rhythm, S1 normal and S2 normal. Exam reveals no gallop and no friction rub.   No murmur heard.  Pulmonary/Chest: Effort normal and breath sounds normal.   Abdominal: Soft. Bowel sounds are normal. She exhibits no mass. There is no tenderness.   Musculoskeletal: Normal range of motion. She exhibits no edema.   Lymphadenopathy:     She has no cervical adenopathy.   Neurological: She is alert and oriented to person, place, and time.   Skin: Skin is warm and dry. No rash noted.   Psychiatric: She has a normal mood " and affect.       Lab Results   Component Value Date     2017    K 4.4 2017     2017    CO2 30.6 2017    BUN 29 (H) 2017    CREATININE 0.82 2017    GLUCOSE 88 2017    CALCIUM 9.8 2017    AST 33 (H) 2015    ALT 30 2015    ALKPHOS 82 2015    LABIL2 2.0 2015     No results found for: CKTOTAL  Lab Results   Component Value Date    WBC 6.08 2016    HGB 13.1 2016    HCT 38.6 2016     2016     Lab Results   Component Value Date    INR 1.00 2016     Kerri Kinney   Regadenoson Stress Test With Myocardial Perfusion SPECT (Multi Study)   Order# [id number]   Reading physician: Krish Ocasio MD Ordering physician: Krish Ocasio MD Study date: 18   Patient Information     Patient Name  Kerri Kinney MRN  [id number] Sex  Female  (Age)  1945 (73 y.o.)   Interpretation Summary     · Myocardial perfusion imaging indicates a medium-to-large-sized infarct located in the anterior wall, septal wall and apex with no significant ischemia noted.  · Abnormal LV wall motion consistent with dyskinesis. Apical dyskinesis..  · Left ventricular ejection fraction is moderately reduced (Calculated EF = 35%).  · Impressions are consistent with an intermediate risk study     Kerri Kinney   Echocardiogram   Order# [id number]   Reading physician: Krish Ocasio MD Ordering physician: Krish Ocasio MD Study date: 18   Patient Information     Patient Name  Kerri Kinney MRN  [id number] Sex  Female  (Age)  1945 (73 y.o.)   Sedation Narrator Report     Interpretation Summary     · The left ventricular cavity is mildly dilated.  · Estimated EF appears to be in the range of 31 - 35%.  · The aortic valve is abnormal in structure. There is severe calcification of the aortic valve mainly affecting the non, left and right coronary cusp(s).Mild to moderate aortic valve regurgitation is present.  Moderate aortic valve stenosis is present.  · The mitral valve is abnormal in structure. Mitral annular calcification is present. Mild mitral valve regurgitation is present. Mild mitral valve stenosis is present.  · The tricuspid valve is abnormal. Mild tricuspid valve regurgitation is present. Estimated right ventricular systolic pressure from tricuspid regurgitation is moderately elevated (45-55 mmHg).  · Moderate pulmonary hypertension is present.  · There is no evidence of pericardial effusion.       Procedures      Assessment/Plan    Diagnosis Plan   1.  ASCVD, S/P CABG 1997     2. Chronic systolic congestive heart failure (CMS/HCC)     3. Ischemic cardiomyopathy with LV ejection fraction of 30-35% in feb 2018 with NYHA class 2-3 dyspnea.     4. Precordial pain            Recommendations:  1. Will increase Imdur to 60 mg daily and see how she does.  2. Continue with other medications.   3. If recurrent chest pains, will consider further cardiac evaluation with nuclear stress test.      Return in about 3 months (around 4/17/2019).    As always, Du,  I appreciate very much the opportunity to participate in the cardiovascular care of your patients. Please do not hesitate to call me with any questions with regards to [facility] evaluation and management.           With Best Regards,        Krish Ocasio MD, FACC    Dragon disclaimer:  Much of this encounter note is an electronic transcription/translation of spoken language to printed text. The electronic translation of spoken language may permit erroneous, or at times, nonsensical words or phrases to be inadvertently transcribed; Although I have reviewed the note for such errors, some may still exist.

## 2019-03-04 ENCOUNTER — HOSPITAL ENCOUNTER (OUTPATIENT)
Dept: MAMMOGRAPHY | Facility: HOSPITAL | Age: 74
Discharge: HOME | End: 2019-03-04
Admitting: FAMILY MEDICINE

## 2019-03-04 DIAGNOSIS — Z12.39 SCREENING BREAST EXAMINATION: ICD-10-CM

## 2019-03-04 PROCEDURE — 77063 BREAST TOMOSYNTHESIS BI: CPT

## 2019-03-04 PROCEDURE — 77067 SCR MAMMO BI INCL CAD: CPT

## 2019-03-04 PROCEDURE — 77067 SCR MAMMO BI INCL CAD: CPT | Performed by: RADIOLOGY

## 2019-03-04 PROCEDURE — 77063 BREAST TOMOSYNTHESIS BI: CPT | Performed by: RADIOLOGY

## 2019-04-18 RX ORDER — CARVEDILOL 12.5 MG/1
TABLET ORAL
Qty: 60 TABLET | Refills: 0 | Status: SHIPPED | OUTPATIENT
Start: 2019-04-18 | End: 2019-07-29 | Stop reason: SDUPTHER

## 2019-04-30 ENCOUNTER — OFFICE VISIT (OUTPATIENT)
Dept: CARDIOLOGY | Facility: CLINIC | Age: 74
End: 2019-04-30

## 2019-04-30 VITALS
WEIGHT: 129.6 LBS | SYSTOLIC BLOOD PRESSURE: 106 MMHG | OXYGEN SATURATION: 92 % | HEART RATE: 81 BPM | DIASTOLIC BLOOD PRESSURE: 62 MMHG | BODY MASS INDEX: 26.13 KG/M2 | HEIGHT: 59 IN

## 2019-04-30 DIAGNOSIS — I25.10 ASCVD (ARTERIOSCLEROTIC CARDIOVASCULAR DISEASE): Primary | ICD-10-CM

## 2019-04-30 DIAGNOSIS — I25.5 ISCHEMIC CARDIOMYOPATHY: ICD-10-CM

## 2019-04-30 DIAGNOSIS — N18.30 STAGE 3 CHRONIC KIDNEY DISEASE (HCC): ICD-10-CM

## 2019-04-30 DIAGNOSIS — E78.2 MIXED HYPERLIPIDEMIA: ICD-10-CM

## 2019-04-30 PROCEDURE — 99213 OFFICE O/P EST LOW 20 MIN: CPT | Performed by: INTERNAL MEDICINE

## 2019-04-30 PROCEDURE — 93000 ELECTROCARDIOGRAM COMPLETE: CPT | Performed by: INTERNAL MEDICINE

## 2019-05-29 RX ORDER — SACUBITRIL AND VALSARTAN 24; 26 MG/1; MG/1
TABLET, FILM COATED ORAL
Qty: 60 TABLET | Refills: 2 | Status: SHIPPED | OUTPATIENT
Start: 2019-05-29 | End: 2019-08-01 | Stop reason: SDUPTHER

## 2019-05-30 RX ORDER — SACUBITRIL AND VALSARTAN 24; 26 MG/1; MG/1
TABLET, FILM COATED ORAL
Qty: 120 TABLET | Refills: 1 | Status: SHIPPED | OUTPATIENT
Start: 2019-05-30 | End: 2019-12-14 | Stop reason: SDUPTHER

## 2019-06-01 LAB
ALBUMIN SERPL-MCNC: 4.7 G/DL (ref 3.5–4.8)
ALBUMIN/GLOB SERPL: 2.8 {RATIO} (ref 1.2–2.2)
ALP SERPL-CCNC: 105 IU/L (ref 39–117)
ALT SERPL-CCNC: 29 IU/L (ref 0–32)
AMBIG ABBREV CMP14 DEFAULT: NORMAL
AST SERPL-CCNC: 36 IU/L (ref 0–40)
BILIRUB SERPL-MCNC: 0.6 MG/DL (ref 0–1.2)
BUN SERPL-MCNC: 40 MG/DL (ref 8–27)
BUN/CREAT SERPL: 35 (ref 12–28)
CALCIUM SERPL-MCNC: 9.9 MG/DL (ref 8.7–10.3)
CHLORIDE SERPL-SCNC: 98 MMOL/L (ref 96–106)
CO2 SERPL-SCNC: 27 MMOL/L (ref 20–29)
CREAT SERPL-MCNC: 1.15 MG/DL (ref 0.57–1)
GLOBULIN SER CALC-MCNC: 1.7 G/DL (ref 1.5–4.5)
GLUCOSE SERPL-MCNC: 80 MG/DL (ref 65–99)
POTASSIUM SERPL-SCNC: 4.6 MMOL/L (ref 3.5–5.2)
PROT SERPL-MCNC: 6.4 G/DL (ref 6–8.5)
SODIUM SERPL-SCNC: 141 MMOL/L (ref 134–144)

## 2019-06-03 ENCOUNTER — TELEPHONE (OUTPATIENT)
Dept: CARDIOLOGY | Facility: CLINIC | Age: 74
End: 2019-06-03

## 2019-06-03 DIAGNOSIS — I25.10 ASCVD (ARTERIOSCLEROTIC CARDIOVASCULAR DISEASE): Primary | ICD-10-CM

## 2019-06-03 NOTE — TELEPHONE ENCOUNTER
Called and given message per Dr. Ocasio.  Hold Lasix x 3 days then take qod and repeat labs in 1 week.  She v/u.

## 2019-06-03 NOTE — TELEPHONE ENCOUNTER
----- Message from rKish Ocasio MD sent at 6/3/2019  9:04 AM EDT -----  Please asked her to hold the Lasix for 3 days and then take it every other day and get a BMP in 1 week.  ----- Message -----  From: Analilia, Reflab Results In  Sent: 6/1/2019   8:16 AM  To: Krish Ocasio MD

## 2019-06-18 ENCOUNTER — TRANSCRIBE ORDERS (OUTPATIENT)
Dept: ADMINISTRATIVE | Facility: HOSPITAL | Age: 74
End: 2019-06-18

## 2019-06-18 DIAGNOSIS — I50.22 CHRONIC SYSTOLIC HEART FAILURE (HCC): Primary | ICD-10-CM

## 2019-06-25 ENCOUNTER — HOSPITAL ENCOUNTER (OUTPATIENT)
Dept: CARDIOLOGY | Facility: HOSPITAL | Age: 74
Discharge: HOME | End: 2019-06-25
Admitting: FAMILY MEDICINE

## 2019-06-25 DIAGNOSIS — I50.22 CHRONIC SYSTOLIC HEART FAILURE (HCC): ICD-10-CM

## 2019-06-25 LAB
BH CV ECHO MEAS - ACS: 1 CM
BH CV ECHO MEAS - AI DEC SLOPE: 308 CM/SEC^2
BH CV ECHO MEAS - AI MAX PG: 61.7 MMHG
BH CV ECHO MEAS - AI MAX VEL: 392.7 CM/SEC
BH CV ECHO MEAS - AI P1/2T: 373.4 MSEC
BH CV ECHO MEAS - AO MAX PG (FULL): 10.6 MMHG
BH CV ECHO MEAS - AO MAX PG: 14.8 MMHG
BH CV ECHO MEAS - AO MEAN PG (FULL): 5.8 MMHG
BH CV ECHO MEAS - AO MEAN PG: 8.8 MMHG
BH CV ECHO MEAS - AO ROOT AREA (BSA CORRECTED): 1.8
BH CV ECHO MEAS - AO ROOT AREA: 6.2 CM^2
BH CV ECHO MEAS - AO ROOT DIAM: 2.8 CM
BH CV ECHO MEAS - AO V2 MAX: 192.5 CM/SEC
BH CV ECHO MEAS - AO V2 MEAN: 136.8 CM/SEC
BH CV ECHO MEAS - AO V2 VTI: 38.8 CM
BH CV ECHO MEAS - AVA(I,A): 1.6 CM^2
BH CV ECHO MEAS - AVA(I,D): 1.6 CM^2
BH CV ECHO MEAS - AVA(V,A): 1.5 CM^2
BH CV ECHO MEAS - AVA(V,D): 1.5 CM^2
BH CV ECHO MEAS - BSA(HAYCOCK): 1.6 M^2
BH CV ECHO MEAS - BSA: 1.5 M^2
BH CV ECHO MEAS - BZI_BMI: 26.1 KILOGRAMS/M^2
BH CV ECHO MEAS - BZI_METRIC_HEIGHT: 149.9 CM
BH CV ECHO MEAS - BZI_METRIC_WEIGHT: 58.5 KG
BH CV ECHO MEAS - LA DIMENSION: 3.5 CM
BH CV ECHO MEAS - LA/AO: 1.2
BH CV ECHO MEAS - LV MAX PG: 4.2 MMHG
BH CV ECHO MEAS - LV MEAN PG: 3 MMHG
BH CV ECHO MEAS - LV V1 MAX: 103 CM/SEC
BH CV ECHO MEAS - LV V1 MEAN: 79 CM/SEC
BH CV ECHO MEAS - LV V1 VTI: 21.3 CM
BH CV ECHO MEAS - LVOT AREA (M): 2.8 CM^2
BH CV ECHO MEAS - LVOT AREA: 2.8 CM^2
BH CV ECHO MEAS - LVOT DIAM: 1.9 CM
BH CV ECHO MEAS - MV A MAX VEL: 114 CM/SEC
BH CV ECHO MEAS - MV E MAX VEL: 65.2 CM/SEC
BH CV ECHO MEAS - MV E/A: 0.57
BH CV ECHO MEAS - PA ACC SLOPE: 1172 CM/SEC^2
BH CV ECHO MEAS - PA ACC TIME: 0.09 SEC
BH CV ECHO MEAS - PA PR(ACCEL): 39.4 MMHG
BH CV ECHO MEAS - RAP SYSTOLE: 10 MMHG
BH CV ECHO MEAS - RVSP: 39.2 MMHG
BH CV ECHO MEAS - SI(AO): 156.2 ML/M^2
BH CV ECHO MEAS - SI(LVOT): 39.5 ML/M^2
BH CV ECHO MEAS - SV(AO): 239.1 ML
BH CV ECHO MEAS - SV(LVOT): 60.4 ML
BH CV ECHO MEAS - TR MAX VEL: 270 CM/SEC
MAXIMAL PREDICTED HEART RATE: 147 BPM
STRESS TARGET HR: 125 BPM

## 2019-06-25 PROCEDURE — 93306 TTE W/DOPPLER COMPLETE: CPT | Performed by: INTERNAL MEDICINE

## 2019-06-25 PROCEDURE — 93306 TTE W/DOPPLER COMPLETE: CPT

## 2019-07-16 RX ORDER — ISOSORBIDE MONONITRATE 60 MG/1
TABLET, EXTENDED RELEASE ORAL
Qty: 30 TABLET | Refills: 0 | Status: SHIPPED | OUTPATIENT
Start: 2019-07-16 | End: 2019-08-21 | Stop reason: SDUPTHER

## 2019-07-29 RX ORDER — CARVEDILOL 12.5 MG/1
TABLET ORAL
Qty: 60 TABLET | Refills: 0 | Status: SHIPPED | OUTPATIENT
Start: 2019-07-29 | End: 2019-09-04 | Stop reason: SDUPTHER

## 2019-08-01 ENCOUNTER — OFFICE VISIT (OUTPATIENT)
Dept: CARDIOLOGY | Facility: CLINIC | Age: 74
End: 2019-08-01

## 2019-08-01 VITALS
WEIGHT: 128 LBS | DIASTOLIC BLOOD PRESSURE: 65 MMHG | OXYGEN SATURATION: 96 % | BODY MASS INDEX: 25.8 KG/M2 | SYSTOLIC BLOOD PRESSURE: 144 MMHG | HEIGHT: 59 IN | HEART RATE: 70 BPM

## 2019-08-01 DIAGNOSIS — I10 ESSENTIAL HYPERTENSION: ICD-10-CM

## 2019-08-01 DIAGNOSIS — I50.22 CHRONIC SYSTOLIC CONGESTIVE HEART FAILURE (HCC): ICD-10-CM

## 2019-08-01 DIAGNOSIS — I25.10 ASCVD (ARTERIOSCLEROTIC CARDIOVASCULAR DISEASE): Primary | ICD-10-CM

## 2019-08-01 DIAGNOSIS — R07.2 PRECORDIAL PAIN: ICD-10-CM

## 2019-08-01 DIAGNOSIS — I25.5 ISCHEMIC CARDIOMYOPATHY: ICD-10-CM

## 2019-08-01 DIAGNOSIS — E78.2 MIXED HYPERLIPIDEMIA: ICD-10-CM

## 2019-08-01 PROCEDURE — 99214 OFFICE O/P EST MOD 30 MIN: CPT | Performed by: NURSE PRACTITIONER

## 2019-08-01 RX ORDER — NITROGLYCERIN 0.4 MG/1
TABLET SUBLINGUAL
Qty: 25 TABLET | Refills: 3 | Status: SHIPPED | OUTPATIENT
Start: 2019-08-01

## 2019-08-01 NOTE — PROGRESS NOTES
Kreis, Samuel Duane, MD  Kerri Kinney  1945 08/01/2019    Patient Active Problem List   Diagnosis   •  ASCVD, S/P CABG 1997   • CKD (chronic kidney disease) (stage III)   •  S/P ICD with recent gen change 2009   • Preop cardiovascular exam   • History of chronic class II to III congestive heart failure   • Coronary artery disease   • Hypertension   • Depression   • Anxiety   • Hyperlipidemia   • GERD (gastroesophageal reflux disease)   • Dizziness   • Ischemic cardiomyopathy with LV ejection fraction of 30-35% in feb 2018 with NYHA class 2-3 dyspnea.   • Precordial pain       Dear Kreis, Samuel Duane, MD:    Subjective     Chief Complaint   Patient presents with   • ASCVD     Follow up   • Chest Pain     left sided, feels like around pacer implantation site   • Shortness of Breath     no change           History of Present Illness:    Kerri Kinney is a 74 y.o. female with a past medical history significant for ASCVD with previous MI followed by CABG in 1997.  She also has a history of advanced underlying ischemic cardiomyopathy with EF of 30 to 35% status post ICD implantation with recent revision of the ICD by Dr. Patel scented 2016.  She presents today for routine cardiology follow-up.  She reports she does have some chest pains around her ICD which she describes as sharp fleeting pains.  These are not related to exertion or movements.  She cannot identify any alleviating factors.  This occurs very rarely.  However, she does note recently she has been more short of breath over the past couple of months.  Her PCP ordered an echocardiogram which revealed an EF of 31 to 35% with moderate aortic valve regurgitation, mild aortic valve stenosis, mild mitral valve regurgitation, and mild mitral valve stenosis.  There was some hypokinesis and dyskinesis of the LV wall.  Pulmonary hypertension which was present on her study 2018 was resolved. She denies edema, orthopnea, and PND.  She is actually lost weight  since her last visit here.        Allergies   Allergen Reactions   • Codeine    • Demerol [Meperidine]    • Penicillins    • Terramycin [Oxytetracycline]    :      Current Outpatient Medications:   •  acetaminophen (TYLENOL) 100 MG/ML solution, Take 10 mg/kg by mouth every 4 (four) hours as needed for fever., Disp: , Rfl:   •  allopurinol (ZYLOPRIM) 100 MG tablet, Take 100 mg by mouth daily., Disp: , Rfl:   •  aspirin 81 MG EC tablet, Take 81 mg by mouth daily., Disp: , Rfl:   •  atorvastatin (LIPITOR) 40 MG tablet, Take 40 mg by mouth daily., Disp: , Rfl:   •  carvedilol (COREG) 12.5 MG tablet, TAKE ONE TABLET BY MOUTH TWICE A DAY, Disp: 60 tablet, Rfl: 0  •  clonazePAM (KlonoPIN) 0.5 MG tablet, Take 0.5 mg by mouth 2 (two) times a day as needed for seizures., Disp: , Rfl:   •  clopidogrel (PLAVIX) 75 MG tablet, Take 1 tablet by mouth Daily., Disp: 90 tablet, Rfl: 0  •  dexlansoprazole (DEXILANT) 60 MG capsule, Take 60 mg by mouth daily., Disp: , Rfl:   •  diclofenac (VOLTAREN) 1 % gel gel, Apply 4 g topically 4 (Four) Times a Day As Needed., Disp: , Rfl:   •  DULoxetine (CYMBALTA) 60 MG capsule, Take 60 mg by mouth daily., Disp: , Rfl:   •  ENTRESTO 24-26 MG tablet, TAKE ONE TABLET BY MOUTH TWICE A DAY, Disp: 120 tablet, Rfl: 1  •  furosemide (LASIX) 40 MG tablet, Take 40 mg by mouth As Needed., Disp: , Rfl:   •  Garlic 1000 MG capsule, Take 1,000 mg by mouth Daily., Disp: , Rfl:   •  HYDROcodone-acetaminophen (LORTAB) 5-325 MG per tablet, Take 1 tablet by mouth Every 6 (Six) Hours As Needed., Disp: , Rfl:   •  imipramine (TOFRANIL) 10 MG tablet, Take 10 mg by mouth 4 (Four) Times a Day., Disp: , Rfl:   •  isosorbide mononitrate (IMDUR) 60 MG 24 hr tablet, TAKE ONE TABLET BY MOUTH DAILY, Disp: 30 tablet, Rfl: 0  •  nitroglycerin (NITROSTAT) 0.4 MG SL tablet, 1 under the tongue as needed for angina, may repeat q5mins for up three doses, Disp: 25 tablet, Rfl: 3  •  Omega-3 Fatty Acids (FISH OIL) 1000 MG capsule  "capsule, Take  by mouth daily with breakfast., Disp: , Rfl:   •  tiZANidine (ZANAFLEX) 4 MG tablet, Take 4 mg by mouth at night as needed for muscle spasms., Disp: , Rfl:       The following portions of the patient's history were reviewed and updated as appropriate: allergies, current medications, past family history, past medical history, past social history, past surgical history and problem list.    Social History     Tobacco Use   • Smoking status: Former Smoker     Packs/day: 2.00     Types: Cigarettes     Last attempt to quit:      Years since quittin.5   • Smokeless tobacco: Never Used   Substance Use Topics   • Alcohol use: No   • Drug use: No       Review of Systems   Constitution: Negative for weakness and malaise/fatigue.   Cardiovascular: Positive for chest pain, dyspnea on exertion and leg swelling (right sided from foot surgery). Negative for irregular heartbeat, near-syncope, orthopnea, palpitations, paroxysmal nocturnal dyspnea and syncope.   Respiratory: Negative for cough, shortness of breath and wheezing.    Neurological: Negative for dizziness and light-headedness.       Objective   Vitals:    19 1445   BP: 144/65   BP Location: Right arm   Patient Position: Sitting   Cuff Size: Adult   Pulse: 70   SpO2: 96%   Weight: 58.1 kg (128 lb)   Height: 149.9 cm (59\")     Body mass index is 25.85 kg/m².        Physical Exam   Constitutional: She is oriented to person, place, and time. She appears well-developed and well-nourished.   HENT:   Head: Normocephalic and atraumatic.   Cardiovascular: Normal rate and regular rhythm. Exam reveals no S3 and no S4.   Murmur (grade 2/6 systolic murmur LLSB, RUSB) heard.  Pulmonary/Chest: Effort normal and breath sounds normal. She has no wheezes. She has no rales.   Abdominal: Soft. Bowel sounds are normal.   Musculoskeletal: She exhibits no edema.   Neurological: She is alert and oriented to person, place, and time.   Skin: Skin is warm and dry. "   Psychiatric: She has a normal mood and affect. Her behavior is normal.       Lab Results   Component Value Date     05/31/2019    K 4.6 05/31/2019    CL 98 05/31/2019    CO2 27 05/31/2019    BUN 40 (H) 05/31/2019    CREATININE 1.15 (H) 05/31/2019    GLUCOSE 88 03/08/2017    CALCIUM 9.9 05/31/2019    AST 36 05/31/2019    ALT 29 05/31/2019    ALKPHOS 105 05/31/2019    LABIL2 2.8 (H) 05/31/2019           Procedures      Assessment/Plan    Diagnosis Plan   1.  ASCVD, S/P CABG 1997  nitroglycerin (NITROSTAT) 0.4 MG SL tablet   2. Essential hypertension     3. Mixed hyperlipidemia     4. Ischemic cardiomyopathy with LV ejection fraction of 30-35% in feb 2018 with NYHA class 2-3 dyspnea.  Stress Test With Myocardial Perfusion   5. Precordial pain  nitroglycerin (NITROSTAT) 0.4 MG SL tablet    Stress Test With Myocardial Perfusion   6. Chronic systolic congestive heart failure (CMS/HCC)  Stress Test With Myocardial Perfusion                Recommendations:    1.  We will evaluate her dyspnea and chest pains further with a Lexiscan stress test.    2.  I have encouraged her to continue routine follow-up with Dr. Andrade in electrophysiology.      3. Continue low-dose aspirin, Plavix, atorvastatin, carvedilol, Entresto, furosemide, and Imdur.    4. Follow up in 3 months and PRN.      Patient's Body mass index is 25.85 kg/m². BMI is within normal parameters. No follow-up required..         Return in about 3 months (around 11/1/2019) for Recheck.    As always, I appreciate very much the opportunity to participate in the cardiovascular care of your patients.      With Best Regards,    RUPA Love

## 2019-08-19 ENCOUNTER — APPOINTMENT (OUTPATIENT)
Dept: CARDIOLOGY | Facility: HOSPITAL | Age: 74
End: 2019-08-19

## 2019-08-19 ENCOUNTER — APPOINTMENT (OUTPATIENT)
Dept: NUCLEAR MEDICINE | Facility: HOSPITAL | Age: 74
End: 2019-08-19

## 2019-08-21 RX ORDER — ISOSORBIDE MONONITRATE 60 MG/1
TABLET, EXTENDED RELEASE ORAL
Qty: 30 TABLET | Refills: 0 | Status: SHIPPED | OUTPATIENT
Start: 2019-08-21 | End: 2019-09-16 | Stop reason: SDUPTHER

## 2019-09-04 RX ORDER — CARVEDILOL 12.5 MG/1
TABLET ORAL
Qty: 60 TABLET | Refills: 0 | Status: SHIPPED | OUTPATIENT
Start: 2019-09-04 | End: 2019-10-04 | Stop reason: SDUPTHER

## 2019-09-16 RX ORDER — ISOSORBIDE MONONITRATE 60 MG/1
TABLET, EXTENDED RELEASE ORAL
Qty: 30 TABLET | Refills: 0 | Status: SHIPPED | OUTPATIENT
Start: 2019-09-16 | End: 2019-10-16 | Stop reason: SDUPTHER

## 2019-10-04 RX ORDER — CARVEDILOL 12.5 MG/1
TABLET ORAL
Qty: 60 TABLET | Refills: 0 | Status: SHIPPED | OUTPATIENT
Start: 2019-10-04 | End: 2019-11-06 | Stop reason: SDUPTHER

## 2019-10-16 RX ORDER — ISOSORBIDE MONONITRATE 60 MG/1
TABLET, EXTENDED RELEASE ORAL
Qty: 30 TABLET | Refills: 0 | Status: SHIPPED | OUTPATIENT
Start: 2019-10-16 | End: 2019-11-18 | Stop reason: SDUPTHER

## 2019-11-05 ENCOUNTER — OFFICE VISIT (OUTPATIENT)
Dept: CARDIOLOGY | Facility: CLINIC | Age: 74
End: 2019-11-05

## 2019-11-05 VITALS
BODY MASS INDEX: 25.76 KG/M2 | SYSTOLIC BLOOD PRESSURE: 95 MMHG | HEIGHT: 59 IN | HEART RATE: 75 BPM | WEIGHT: 127.8 LBS | RESPIRATION RATE: 16 BRPM | DIASTOLIC BLOOD PRESSURE: 56 MMHG

## 2019-11-05 DIAGNOSIS — E78.2 MIXED HYPERLIPIDEMIA: ICD-10-CM

## 2019-11-05 DIAGNOSIS — I25.10 ASCVD (ARTERIOSCLEROTIC CARDIOVASCULAR DISEASE): Primary | ICD-10-CM

## 2019-11-05 DIAGNOSIS — I25.5 ISCHEMIC CARDIOMYOPATHY: ICD-10-CM

## 2019-11-05 DIAGNOSIS — Z01.810 PREOPERATIVE CARDIOVASCULAR EXAMINATION: ICD-10-CM

## 2019-11-05 PROCEDURE — 93000 ELECTROCARDIOGRAM COMPLETE: CPT | Performed by: INTERNAL MEDICINE

## 2019-11-05 PROCEDURE — 99213 OFFICE O/P EST LOW 20 MIN: CPT | Performed by: INTERNAL MEDICINE

## 2019-11-05 RX ORDER — GABAPENTIN 300 MG/1
300 CAPSULE ORAL 3 TIMES DAILY
COMMUNITY
End: 2020-02-04 | Stop reason: ALTCHOICE

## 2019-11-05 NOTE — PROGRESS NOTES
Kreis, Samuel Duane, MD  Kerri Kinney  1945 11/05/2019    Patient Active Problem List   Diagnosis   •  ASCVD, S/P CABG 1997   • CKD (chronic kidney disease) (stage III)   •  S/P ICD with recent gen change 2009   • Preoperative cardiovascular examination   • History of chronic class II to III congestive heart failure   • Coronary artery disease   • Hypertension   • Depression   • Anxiety   • Hyperlipidemia   • GERD (gastroesophageal reflux disease)   • Dizziness   • Ischemic cardiomyopathy with LV ejection fraction of 30-35% in feb 2018 with NYHA class 2-3 dyspnea.   • Precordial pain       Dear Kreis, Samuel Duane, MD:    Subjective     Kerri Kinney is a 74 y.o. female with the problems as listed above, presents    Chief Complaint   Patient presents with   • Results     stress findings   • Chest Pain      [facility] er visit x1 mo ago     History of Present Illness: Ms. Kinney is a pleasant 74-year-old  female with history of known ASCVD with a previous anteroseptal and apical myocardial infarction (remote), status post CABG 1997 with underlying advanced ischemic cardiomyopathy, status post ICD implantation, is here for regular cardiology follow-up.  She was recently evaluated at [facility] in August 2019 after she presented there with chest pains.  She reportedly ruled out for acute myocardial infarction.  She underwent Lexiscan sestamibi study that revealed essentially a large fixed anteroseptal  defect with no evidence of myocardial ischemia.  On today's visit she denies any further significant chest pains.  She has chronic dyspnea with mild exertion with no PND, orthopnea or pedal edema.    Allergies   Allergen Reactions   • Codeine    • Demerol [Meperidine]    • Penicillins    • Terramycin [Oxytetracycline]    :      Current Outpatient Medications:   •  Acetaminophen (TYLENOL EX ST ARTHRITIS PAIN PO), Take  by mouth., Disp: , Rfl:   •  allopurinol (ZYLOPRIM) 100 MG tablet, Take  100 mg by mouth daily., Disp: , Rfl:   •  aspirin 81 MG EC tablet, Take 81 mg by mouth daily., Disp: , Rfl:   •  atorvastatin (LIPITOR) 40 MG tablet, Take 40 mg by mouth daily., Disp: , Rfl:   •  carvedilol (COREG) 12.5 MG tablet, TAKE ONE TABLET BY MOUTH TWICE A DAY, Disp: 60 tablet, Rfl: 0  •  clonazePAM (KlonoPIN) 0.5 MG tablet, Take 0.5 mg by mouth 2 (two) times a day as needed for seizures., Disp: , Rfl:   •  clopidogrel (PLAVIX) 75 MG tablet, Take 1 tablet by mouth Daily., Disp: 90 tablet, Rfl: 0  •  dexlansoprazole (DEXILANT) 60 MG capsule, Take 60 mg by mouth daily., Disp: , Rfl:   •  diclofenac (VOLTAREN) 1 % gel gel, Apply 4 g topically 4 (Four) Times a Day As Needed., Disp: , Rfl:   •  DULoxetine (CYMBALTA) 60 MG capsule, Take 60 mg by mouth daily., Disp: , Rfl:   •  ENTRESTO 24-26 MG tablet, TAKE ONE TABLET BY MOUTH TWICE A DAY, Disp: 120 tablet, Rfl: 1  •  furosemide (LASIX) 40 MG tablet, Take 40 mg by mouth As Needed., Disp: , Rfl:   •  gabapentin (NEURONTIN) 300 MG capsule, Take 300 mg by mouth 3 (Three) Times a Day., Disp: , Rfl:   •  Garlic 1000 MG capsule, Take 1,000 mg by mouth Daily., Disp: , Rfl:   •  HYDROcodone-acetaminophen (LORTAB) 5-325 MG per tablet, Take 1 tablet by mouth Every 6 (Six) Hours As Needed., Disp: , Rfl:   •  imipramine (TOFRANIL) 10 MG tablet, Take 10 mg by mouth 4 (Four) Times a Day., Disp: , Rfl:   •  isosorbide mononitrate (IMDUR) 60 MG 24 hr tablet, TAKE ONE TABLET BY MOUTH DAILY, Disp: 30 tablet, Rfl: 0  •  nitroglycerin (NITROSTAT) 0.4 MG SL tablet, 1 under the tongue as needed for angina, may repeat q5mins for up three doses, Disp: 25 tablet, Rfl: 3  •  Omega-3 Fatty Acids (FISH OIL) 1000 MG capsule capsule, Take  by mouth daily with breakfast., Disp: , Rfl:   •  tiZANidine (ZANAFLEX) 4 MG tablet, Take 4 mg by mouth at night as needed for muscle spasms., Disp: , Rfl:   •  acetaminophen (TYLENOL) 100 MG/ML solution, Take 10 mg/kg by mouth every 4 (four) hours as needed  "for fever., Disp: , Rfl:       The following portions of the patient's history were reviewed and updated as appropriate: allergies, current medications, past family history, past medical history, past social history, past surgical history and problem list.    Social History     Tobacco Use   • Smoking status: Former Smoker     Packs/day: 2.00     Types: Cigarettes     Last attempt to quit:      Years since quittin.8   • Smokeless tobacco: Never Used   Substance Use Topics   • Alcohol use: No   • Drug use: No       Review of Systems   Constitution: Negative for chills and fever.   HENT: Negative for nosebleeds and sore throat.    Cardiovascular: Negative for chest pain, leg swelling and palpitations.   Respiratory: Positive for shortness of breath. Negative for cough, hemoptysis and wheezing.    Gastrointestinal: Negative for abdominal pain, hematemesis, hematochezia, melena, nausea and vomiting.   Genitourinary: Negative for dysuria and hematuria.   Neurological: Negative for headaches.     Objective   Vitals:    19 1257   BP: 95/56   Pulse: 75   Resp: 16   Weight: 58 kg (127 lb 12.8 oz)   Height: 149.9 cm (59\")     Body mass index is 25.81 kg/m².    Physical Exam   Constitutional: She is oriented to person, place, and time. She appears well-developed and well-nourished.   HENT:   Mouth/Throat: Oropharynx is clear and moist.   Eyes: EOM are normal. Pupils are equal, round, and reactive to light.   Neck: Neck supple. No JVD present. No tracheal deviation present. No thyromegaly present.   Cardiovascular: Normal rate, regular rhythm, S1 normal and S2 normal. Exam reveals no gallop and no friction rub.   No murmur heard.  Pulmonary/Chest: Effort normal and breath sounds normal.   Abdominal: Soft. Bowel sounds are normal. She exhibits no mass. There is no tenderness.   Musculoskeletal: Normal range of motion. She exhibits no edema.   Lymphadenopathy:     She has no cervical adenopathy.   Neurological: She " is alert and oriented to person, place, and time.   Skin: Skin is warm and dry. No rash noted.   Psychiatric: She has a normal mood and affect.           Lab Results   Component Value Date     2019    K 4.6 2019    CL 98 2019    CO2 27 2019    BUN 40 (H) 2019    CREATININE 1.15 (H) 2019    GLUCOSE 88 2017    CALCIUM 9.9 2019    AST 36 2019    ALT 29 2019    ALKPHOS 105 2019    LABIL2 2.8 (H) 2019     No results found for: CKTOTAL  Lab Results   Component Value Date    WBC 6.08 2016    HGB 13.1 2016    HCT 38.6 2016     2016     Lab Results   Component Value Date    INR 1.00 2016       During this visit the following were done:  Labs Reviewed [x]    Radiology Reports Reviewed [x]    Hospital Records Reviewed [x]    Kerri Kinney   Echocardiogram   Order# [id number]   Reading physician:   Krish Ocasio MD Ordering physician:   Kreis, Samuel Duane, MD Study date: 19   Patient Information     Patient Name  Kerri Kinney MRN  [id number] Sex  Female  (Age)  1945 (74 y.o.)   Sedation Narrator Report     Interpretation Summary     · The left ventricular cavity is mildly dilated.  · The following left ventricular wall segments are hypokinetic: basal inferolateral, mid inferolateral, basal inferoseptal, mid inferoseptal, mid anteroseptal and basal inferoseptal. The following left ventricular wall segments are dyskinetic: apical anterior, apical lateral, apical inferior, apical septal and apex.  · Estimated EF appears to be in the range of 31 - 35%.  · The aortic valve appears trileaflet. The aortic valve is abnormal in structure. There is mild calcification of the aortic valve.Moderate aortic valve regurgitation is present. Mild aortic valve stenosis is present.  · The mitral valve is abnormal in structure. Mild MAC is present. Mild mitral valve regurgitation is present. Mild mitral valve  stenosis is present.  · The tricuspid valve is normal. No evidence of tricuspid valve stenosis is present. Mild to moderate tricuspid valve regurgitation is present. Estimated right ventricular systolic pressure from tricuspid regurgitation is normal (<35 mmHg).  · There is no evidence of pericardial effusion.  · Comments: No significant change since 2/22/2018. Her pulmonary hypertension appears to be better on this study.             ECG 12 Lead  Date/Time: 11/5/2019 12:59 PM  Performed by: Krish Ocasio MD  Authorized by: Krish Ocasio MD   Comparison: compared with previous ECG from 4/30/2019  Similar to previous ECG  Comments: 100% biventricular pacing.                   Assessment/Plan    Diagnosis Plan   1.  ASCVD, status post previous myocardial infarction, status post CABG 1997, clinically stable.     2. Ischemic cardiomyopathy with LV ejection fraction of 30-35% in June 2019 with NYHA class 3 dyspnea, appears compensated.     3. Mixed hyperlipidemia     4. Preoperative cardiovascular examination          Recommendations:  1. I have reviewed the nuclear stress test results with Ms. Kinney from recent evaluation at [facility] in August 2019.  2. We will continue with aspirin, carvedilol, Plavix, Entresto at current doses.  3. Check CMP.  4. If she needs knee surgery, I told her that her main risk would be developing acute decompensated heart failure due to advanced ischemic cardiomyopathy.  Hence they should be very careful with fluid administration and monitor closely during and after surgery.    Return in about 5 months (around 4/5/2020) for or sooner if needed.    As always, Du  I appreciate very much the opportunity to participate in the cardiovascular care of your patients. Please do not hesitate to call me with any questions with regards to Kerri Kinney's evaluation and management.           With Best Regards,        Krish Ocasio MD, FACC    Please note that portions of this  note were completed with a voice recognition program.

## 2019-11-07 RX ORDER — CARVEDILOL 12.5 MG/1
TABLET ORAL
Qty: 60 TABLET | Refills: 3 | Status: SHIPPED | OUTPATIENT
Start: 2019-11-07 | End: 2020-03-25

## 2019-11-18 RX ORDER — ISOSORBIDE MONONITRATE 60 MG/1
TABLET, EXTENDED RELEASE ORAL
Qty: 30 TABLET | Refills: 0 | Status: SHIPPED | OUTPATIENT
Start: 2019-11-18 | End: 2019-12-14 | Stop reason: SDUPTHER

## 2019-12-16 RX ORDER — SACUBITRIL AND VALSARTAN 24; 26 MG/1; MG/1
TABLET, FILM COATED ORAL
Qty: 180 TABLET | Refills: 0 | Status: SHIPPED | OUTPATIENT
Start: 2019-12-16 | End: 2020-01-09 | Stop reason: SDUPTHER

## 2019-12-16 RX ORDER — ISOSORBIDE MONONITRATE 60 MG/1
TABLET, EXTENDED RELEASE ORAL
Qty: 30 TABLET | Refills: 3 | Status: SHIPPED | OUTPATIENT
Start: 2019-12-16 | End: 2020-05-13

## 2020-01-02 ENCOUNTER — TELEPHONE (OUTPATIENT)
Dept: CARDIOLOGY | Facility: CLINIC | Age: 75
End: 2020-01-02

## 2020-01-02 DIAGNOSIS — I50.22 CHRONIC SYSTOLIC CONGESTIVE HEART FAILURE (HCC): Primary | ICD-10-CM

## 2020-01-07 NOTE — TELEPHONE ENCOUNTER
Patient medication is to early to fill but can send it through tomorrow to find out if it needs a PA or not.

## 2020-01-09 NOTE — TELEPHONE ENCOUNTER
Last office note 11/5/19, Dr. Ocasio states for pt to continue Entresto 24-26 mg bid. Next appt 4/2/20.

## 2020-01-09 NOTE — TELEPHONE ENCOUNTER
Prior auth initiated through Cover my meds. Question set is on provider's desk. When completed, will submit prior auth.

## 2020-02-04 ENCOUNTER — OFFICE VISIT (OUTPATIENT)
Dept: CARDIOLOGY | Facility: CLINIC | Age: 75
End: 2020-02-04

## 2020-02-04 VITALS
HEART RATE: 79 BPM | DIASTOLIC BLOOD PRESSURE: 58 MMHG | WEIGHT: 130 LBS | SYSTOLIC BLOOD PRESSURE: 105 MMHG | HEIGHT: 59 IN | OXYGEN SATURATION: 96 % | BODY MASS INDEX: 26.21 KG/M2

## 2020-02-04 DIAGNOSIS — I25.5 ISCHEMIC CARDIOMYOPATHY: ICD-10-CM

## 2020-02-04 DIAGNOSIS — I25.10 ASCVD (ARTERIOSCLEROTIC CARDIOVASCULAR DISEASE): Primary | ICD-10-CM

## 2020-02-04 DIAGNOSIS — R07.2 PRECORDIAL PAIN: ICD-10-CM

## 2020-02-04 DIAGNOSIS — Z01.810 PREOPERATIVE CARDIOVASCULAR EXAMINATION: ICD-10-CM

## 2020-02-04 PROCEDURE — 99214 OFFICE O/P EST MOD 30 MIN: CPT | Performed by: INTERNAL MEDICINE

## 2020-02-04 NOTE — PROGRESS NOTES
Kreis, Samuel Duane, MD  Kerri Kinney  1945 02/04/2020    Patient Active Problem List   Diagnosis   •  ASCVD, S/P CABG 1997   • CKD (chronic kidney disease) (stage III)   •  S/P ICD with recent gen change 2009   • Preoperative cardiovascular examination   • History of chronic class II to III congestive heart failure   • Coronary artery disease   • Hypertension   • Depression   • Anxiety   • Hyperlipidemia   • GERD (gastroesophageal reflux disease)   • Dizziness   • Ischemic cardiomyopathy with LV ejection fraction of 30-35% in feb 2018 with NYHA class 2-3 dyspnea.   • Precordial pain       Dear Kreis, Samuel Duane, MD:    Subjective     Kerri Kinney is a 74 y.o. female with the problems as listed above, presents    Chief complaint: Follow-up of coronary artery disease and ischemic cardiomyopathy and  cardiac clearance for knee surgery.    History of Present Illness: Ms. Kinney is a very pleasant 74-year-old  female with history of coronary artery disease, status post previous myocardial infarction (anteroapical), status post CABG 1997 with underlying advanced ischemic cardiomyopathy, status post biventricular ICD implantation, is here for regular cardiology follow-up and preoperative cardiac evaluation and cardiac clearance for left knee surgery.  On today's visit she complains of some intermittent chest pains..  She describes these pains as being felt as sharp pains across her chest which seem to occur with exertion and relieved with rest.  They usually resolve spontaneously.  She has some intermittent dyspnea with mild exertion with no PND, orthopnea or pedal edema.  Her last nuclear stress test in 2018 revealed no evidence of myocardial ischemia.  She had a right knee replacement surgery 2 years ago and tolerated well.    Kerri Kinney   Regadenoson Stress Test With Myocardial Perfusion SPECT (Multi Study)   Order# [id number]   [place] physician:   Krish Ocasio MD Ordering physician:    Krish Ocasio MD Study date: 18   Patient Information     Patient Name  Kerri Kinney MRN  [id number] Sex  Female  (Age)  1945 (74 y.o.)   Interpretation Summary     · Myocardial perfusion imaging indicates a medium-to-large-sized infarct located in the anterior wall, septal wall and apex with no significant ischemia noted.  · Abnormal LV wall motion consistent with dyskinesis. Apical dyskinesis..  · Left ventricular ejection fraction is moderately reduced (Calculated EF = 35%).  · Impressions are consistent with an intermediate risk study       Allergies   Allergen Reactions   • Codeine    • Demerol [Meperidine]    • Penicillins    • Terramycin [Oxytetracycline]    :      Current Outpatient Medications:   •  acetaminophen (TYLENOL) 100 MG/ML solution, Take 10 mg/kg by mouth every 4 (four) hours as needed for fever., Disp: , Rfl:   •  allopurinol (ZYLOPRIM) 100 MG tablet, Take 100 mg by mouth daily., Disp: , Rfl:   •  aspirin 81 MG EC tablet, Take 81 mg by mouth daily., Disp: , Rfl:   •  atorvastatin (LIPITOR) 40 MG tablet, Take 40 mg by mouth daily., Disp: , Rfl:   •  carvedilol (COREG) 12.5 MG tablet, TAKE ONE TABLET BY MOUTH TWICE A DAY, Disp: 60 tablet, Rfl: 3  •  clonazePAM (KlonoPIN) 0.5 MG tablet, Take 0.5 mg by mouth 2 (two) times a day as needed for seizures., Disp: , Rfl:   •  dexlansoprazole (DEXILANT) 60 MG capsule, Take 60 mg by mouth daily., Disp: , Rfl:   •  diclofenac (VOLTAREN) 1 % gel gel, Apply 4 g topically 4 (Four) Times a Day As Needed., Disp: , Rfl:   •  DULoxetine (CYMBALTA) 60 MG capsule, Take 60 mg by mouth daily., Disp: , Rfl:   •  furosemide (LASIX) 40 MG tablet, Take 40 mg by mouth As Needed., Disp: , Rfl:   •  Garlic 1000 MG capsule, Take 1,000 mg by mouth Daily., Disp: , Rfl:   •  HYDROcodone-acetaminophen (LORTAB) 5-325 MG per tablet, Take 1 tablet by mouth Every 6 (Six) Hours As Needed., Disp: , Rfl:   •  imipramine (TOFRANIL) 10 MG tablet, Take 10 mg by mouth 4  "(Four) Times a Day., Disp: , Rfl:   •  isosorbide mononitrate (IMDUR) 60 MG 24 hr tablet, TAKE ONE TABLET BY MOUTH DAILY, Disp: 30 tablet, Rfl: 3  •  MAGNESIUM CHLORIDE PO, Take  by mouth., Disp: , Rfl:   •  nitroglycerin (NITROSTAT) 0.4 MG SL tablet, 1 under the tongue as needed for angina, may repeat q5mins for up three doses, Disp: 25 tablet, Rfl: 3  •  Omega-3 Fatty Acids (FISH OIL) 1000 MG capsule capsule, Take  by mouth daily with breakfast., Disp: , Rfl:   •  sacubitril-valsartan (ENTRESTO) 24-26 MG tablet, Take 1 tablet by mouth 2 (Two) Times a Day., Disp: 180 tablet, Rfl: 1  •  tiZANidine (ZANAFLEX) 4 MG tablet, Take 4 mg by mouth at night as needed for muscle spasms., Disp: , Rfl:   •  clopidogrel (PLAVIX) 75 MG tablet, Take 1 tablet by mouth Daily., Disp: 90 tablet, Rfl: 0      The following portions of the patient's history were reviewed and updated as appropriate: allergies, current medications, past family history, past medical history, past social history, past surgical history and problem list.    Social History     Tobacco Use   • Smoking status: Former Smoker     Packs/day: 2.00     Types: Cigarettes     Last attempt to quit:      Years since quittin.1   • Smokeless tobacco: Never Used   Substance Use Topics   • Alcohol use: No   • Drug use: No       Review of Systems   Constitution: Negative for chills and fever.   HENT: Negative for nosebleeds and sore throat.    Cardiovascular: Positive for chest pain and dyspnea on exertion.   Respiratory: Negative for cough, hemoptysis and wheezing.    Gastrointestinal: Negative for abdominal pain, hematemesis, hematochezia, melena, nausea and vomiting.   Genitourinary: Negative for dysuria and hematuria.   Neurological: Negative for headaches.       Objective   Vitals:    20 1316   BP: 105/58   BP Location: Left arm   Patient Position: Sitting   Cuff Size: Adult   Pulse: 79   SpO2: 96%   Weight: 59 kg (130 lb)   Height: 149.9 cm (59\")     Body " mass index is 26.26 kg/m².        Physical Exam   Constitutional: She is oriented to person, place, and time. She appears well-developed and well-nourished.   HENT:   Mouth/Throat: Oropharynx is clear and moist.   Eyes: Pupils are equal, round, and reactive to light. EOM are normal.   Neck: Neck supple. No JVD present. No tracheal deviation present. No thyromegaly present.   Cardiovascular: Normal rate, regular rhythm, S1 normal and S2 normal. Exam reveals no gallop and no friction rub.   No murmur heard.  Pulmonary/Chest: Effort normal and breath sounds normal.   Abdominal: Soft. Bowel sounds are normal. She exhibits no mass. There is no tenderness.   Musculoskeletal: Normal range of motion. She exhibits no edema.   Lymphadenopathy:     She has no cervical adenopathy.   Neurological: She is alert and oriented to person, place, and time.   Skin: Skin is warm and dry. No rash noted.   Psychiatric: She has a normal mood and affect.       Lab Results   Component Value Date     05/31/2019    K 4.6 05/31/2019    CL 98 05/31/2019    CO2 27 05/31/2019    BUN 40 (H) 05/31/2019    CREATININE 1.15 (H) 05/31/2019    GLUCOSE 88 03/08/2017    CALCIUM 9.9 05/31/2019    AST 36 05/31/2019    ALT 29 05/31/2019    ALKPHOS 105 05/31/2019    LABIL2 2.8 (H) 05/31/2019     No results found for: CKTOTAL  Lab Results   Component Value Date    WBC 6.08 05/31/2016    HGB 13.1 05/31/2016    HCT 38.6 05/31/2016     05/31/2016     Lab Results   Component Value Date    INR 1.00 05/31/2016     Assessment/Plan :   Diagnosis Plan   1.  ASCVD, status post previous MI, S/P CABG 1997     2. Precordial pain     3. Ischemic cardiomyopathy with LV ejection fraction of 30-35% in feb 2018 with NYHA class 2-3 dyspnea.     4. Preoperative cardiovascular examination       Recommendations:  1. Continue with aspirin, atorvastatin, carvedilol, Entresto at current doses.  2. Since patient has some intermittent chest pains and needing cardiac  clearance for knee replacement surgery, I have discussed with her about evaluating further for any underlying significant myocardial ischemia with a Lexiscan sestamibi study.  Patient is agreeable.  We will schedule this as soon as possible.  If this reveals no significant myocardial ischemia then she will be cleared for the surgery with moderate cardiac risk.    Return in about 3 months (around 5/4/2020).    As always, Du  I appreciate very much the opportunity to participate in the cardiovascular care of your patients. Please do not hesitate to call me with any questions with regards to Kerri LUO Nirmal's evaluation and management.           With Best Regards,        Krish Ocasio MD, FACC    Please note that portions of this note were completed with a voice recognition program.

## 2020-02-10 ENCOUNTER — TELEPHONE (OUTPATIENT)
Dept: CARDIOLOGY | Facility: CLINIC | Age: 75
End: 2020-02-10

## 2020-02-10 NOTE — TELEPHONE ENCOUNTER
Called and got her stress test scheduled for this Thursday the 13th at 8:00am so we would have the results so we can clear her for her surgery.   Called pt and advised her the new date and time for her stress test. She expressed understanding.

## 2020-02-13 ENCOUNTER — HOSPITAL ENCOUNTER (OUTPATIENT)
Dept: NUCLEAR MEDICINE | Facility: HOSPITAL | Age: 75
Discharge: HOME | End: 2020-02-13

## 2020-02-13 ENCOUNTER — HOSPITAL ENCOUNTER (OUTPATIENT)
Dept: CARDIOLOGY | Facility: HOSPITAL | Age: 75
Discharge: HOME | End: 2020-02-13

## 2020-02-13 ENCOUNTER — HOSPITAL ENCOUNTER (OUTPATIENT)
Dept: GENERAL RADIOLOGY | Facility: HOSPITAL | Age: 75
Discharge: HOME | End: 2020-02-13

## 2020-02-13 ENCOUNTER — TRANSCRIBE ORDERS (OUTPATIENT)
Dept: ADMINISTRATIVE | Facility: HOSPITAL | Age: 75
End: 2020-02-13

## 2020-02-13 DIAGNOSIS — Z01.818 PREOPERATIVE CLEARANCE: ICD-10-CM

## 2020-02-13 DIAGNOSIS — R07.2 PRECORDIAL PAIN: ICD-10-CM

## 2020-02-13 DIAGNOSIS — Z01.818 PREOP EXAMINATION: Primary | ICD-10-CM

## 2020-02-13 LAB
BH CV NUCLEAR PRIOR STUDY: 3
BH CV STRESS BP STAGE 1: NORMAL
BH CV STRESS BP STAGE 2: NORMAL
BH CV STRESS COMMENTS STAGE 1: NORMAL
BH CV STRESS COMMENTS STAGE 2: NORMAL
BH CV STRESS DOSE REGADENOSON STAGE 1: 0.4
BH CV STRESS DURATION MIN STAGE 1: 0
BH CV STRESS DURATION MIN STAGE 2: 4
BH CV STRESS DURATION SEC STAGE 1: 10
BH CV STRESS DURATION SEC STAGE 2: 0
BH CV STRESS HR STAGE 1: 91
BH CV STRESS HR STAGE 2: 99
BH CV STRESS PROTOCOL 1: NORMAL
BH CV STRESS RECOVERY BP: NORMAL MMHG
BH CV STRESS RECOVERY HR: 81 BPM
BH CV STRESS STAGE 1: 1
BH CV STRESS STAGE 2: 2
LV EF NUC BP: 32 %
MAXIMAL PREDICTED HEART RATE: 147 BPM
PERCENT MAX PREDICTED HR: 67.35 %
STRESS BASELINE BP: NORMAL MMHG
STRESS BASELINE HR: 72 BPM
STRESS PERCENT HR: 79 %
STRESS POST PEAK BP: NORMAL MMHG
STRESS POST PEAK HR: 99 BPM
STRESS TARGET HR: 125 BPM

## 2020-02-13 PROCEDURE — 78452 HT MUSCLE IMAGE SPECT MULT: CPT | Performed by: INTERNAL MEDICINE

## 2020-02-13 PROCEDURE — 0 TECHNETIUM SESTAMIBI: Performed by: INTERNAL MEDICINE

## 2020-02-13 PROCEDURE — 71046 X-RAY EXAM CHEST 2 VIEWS: CPT

## 2020-02-13 PROCEDURE — 93018 CV STRESS TEST I&R ONLY: CPT | Performed by: INTERNAL MEDICINE

## 2020-02-13 PROCEDURE — 78452 HT MUSCLE IMAGE SPECT MULT: CPT

## 2020-02-13 PROCEDURE — 71046 X-RAY EXAM CHEST 2 VIEWS: CPT | Performed by: RADIOLOGY

## 2020-02-13 PROCEDURE — A9500 TC99M SESTAMIBI: HCPCS | Performed by: INTERNAL MEDICINE

## 2020-02-13 PROCEDURE — 93017 CV STRESS TEST TRACING ONLY: CPT

## 2020-02-13 PROCEDURE — 25010000002 REGADENOSON 0.4 MG/5ML SOLUTION: Performed by: INTERNAL MEDICINE

## 2020-02-13 RX ADMIN — TECHNETIUM TC 99M SESTAMIBI 1 DOSE: 1 INJECTION INTRAVENOUS at 10:00

## 2020-02-13 RX ADMIN — TECHNETIUM TC 99M SESTAMIBI 1 DOSE: 1 INJECTION INTRAVENOUS at 08:35

## 2020-02-13 RX ADMIN — REGADENOSON 0.4 MG: 0.08 INJECTION, SOLUTION INTRAVENOUS at 10:00

## 2020-02-19 ENCOUNTER — APPOINTMENT (OUTPATIENT)
Dept: NUCLEAR MEDICINE | Facility: HOSPITAL | Age: 75
End: 2020-02-19

## 2020-02-19 ENCOUNTER — APPOINTMENT (OUTPATIENT)
Dept: CARDIOLOGY | Facility: HOSPITAL | Age: 75
End: 2020-02-19

## 2020-03-25 RX ORDER — CARVEDILOL 12.5 MG/1
TABLET ORAL
Qty: 60 TABLET | Refills: 2 | Status: SHIPPED | OUTPATIENT
Start: 2020-03-25 | End: 2020-09-15 | Stop reason: DRUGHIGH

## 2020-04-20 ENCOUNTER — APPOINTMENT (OUTPATIENT)
Dept: MAMMOGRAPHY | Facility: HOSPITAL | Age: 75
End: 2020-04-20

## 2020-05-13 ENCOUNTER — OFFICE VISIT (OUTPATIENT)
Dept: CARDIOLOGY | Facility: CLINIC | Age: 75
End: 2020-05-13

## 2020-05-13 VITALS
WEIGHT: 131 LBS | HEART RATE: 71 BPM | BODY MASS INDEX: 26.41 KG/M2 | DIASTOLIC BLOOD PRESSURE: 56 MMHG | SYSTOLIC BLOOD PRESSURE: 92 MMHG | RESPIRATION RATE: 16 BRPM | OXYGEN SATURATION: 95 % | HEIGHT: 59 IN

## 2020-05-13 DIAGNOSIS — I25.5 ISCHEMIC CARDIOMYOPATHY: ICD-10-CM

## 2020-05-13 DIAGNOSIS — I25.10 ASCVD (ARTERIOSCLEROTIC CARDIOVASCULAR DISEASE): Primary | ICD-10-CM

## 2020-05-13 DIAGNOSIS — E78.2 MIXED HYPERLIPIDEMIA: ICD-10-CM

## 2020-05-13 PROCEDURE — 99213 OFFICE O/P EST LOW 20 MIN: CPT | Performed by: INTERNAL MEDICINE

## 2020-05-13 PROCEDURE — 93000 ELECTROCARDIOGRAM COMPLETE: CPT | Performed by: INTERNAL MEDICINE

## 2020-05-13 RX ORDER — ISOSORBIDE MONONITRATE 60 MG/1
TABLET, EXTENDED RELEASE ORAL
Qty: 30 TABLET | Refills: 4 | Status: SHIPPED | OUTPATIENT
Start: 2020-05-13 | End: 2020-09-24

## 2020-05-13 NOTE — PROGRESS NOTES
Kreis, Samuel Duane, MD  Kerri Kinney  1945 05/13/2020    Patient Active Problem List   Diagnosis   •  ASCVD, S/P CABG 1997   • CKD (chronic kidney disease) (stage III)   •  S/P ICD with recent gen change 2009   • Preoperative cardiovascular examination   • History of chronic class II to III congestive heart failure   • Coronary artery disease   • Hypertension   • Depression   • Anxiety   • Hyperlipidemia   • GERD (gastroesophageal reflux disease)   • Dizziness   • Ischemic cardiomyopathy with LV ejection fraction of 30-35% in feb 2018 with NYHA class 2-3 dyspnea.   • Precordial pain       Dear Kreis, Samuel Duane, MD:    Subjective     Kerri Kinney is a 74 y.o. female with the problems as listed above, presents    Chief Complaint: Follow-up of coronary artery disease and ischemic cardiomyopathy.   Patient presents with       History of Present Illness: Ms. Kinney is a very pleasant 73-year-old  female with history of known coronary artery disease, status post previous myocardial infarction, status post CABG 1997 with underlying advanced ischemic cardiomyopathy, presents for regular cardiology follow-up.  On today's visit she complains of chronic dyspnea with mild exertion with no PND, orthopnea or any significant pedal edema.  She has some intermittent left-sided chest pains over the ICD site which come and go spontaneously and does not require any sublingual nitroglycerin for relief.  She is gained 1 pound since February 2020.  She has mild dizziness but no syncope.  Her blood pressure tends to run low chronically.    Allergies   Allergen Reactions   • Codeine    • Demerol [Meperidine]    • Penicillins    • Terramycin [Oxytetracycline]    :      Current Outpatient Medications:   •  allopurinol (ZYLOPRIM) 100 MG tablet, Take 100 mg by mouth daily., Disp: , Rfl:   •  aspirin 81 MG EC tablet, Take 81 mg by mouth daily., Disp: , Rfl:   •  atorvastatin (LIPITOR) 40 MG tablet, Take 40 mg by mouth  daily., Disp: , Rfl:   •  carvedilol (COREG) 12.5 MG tablet, TAKE ONE TABLET BY MOUTH TWICE A DAY, Disp: 60 tablet, Rfl: 2  •  clonazePAM (KlonoPIN) 0.5 MG tablet, Take 0.5 mg by mouth 2 (two) times a day as needed for seizures., Disp: , Rfl:   •  dexlansoprazole (DEXILANT) 60 MG capsule, Take 60 mg by mouth daily., Disp: , Rfl:   •  diclofenac (VOLTAREN) 1 % gel gel, Apply 4 g topically 4 (Four) Times a Day As Needed., Disp: , Rfl:   •  DULoxetine (CYMBALTA) 60 MG capsule, Take 60 mg by mouth daily., Disp: , Rfl:   •  furosemide (LASIX) 40 MG tablet, Take 40 mg by mouth As Needed., Disp: , Rfl:   •  Garlic 1000 MG capsule, Take 1,000 mg by mouth Daily., Disp: , Rfl:   •  HYDROcodone-acetaminophen (LORTAB) 5-325 MG per tablet, Take 1 tablet by mouth Every 6 (Six) Hours As Needed., Disp: , Rfl:   •  imipramine (TOFRANIL) 10 MG tablet, Take 10 mg by mouth 4 (Four) Times a Day., Disp: , Rfl:   •  isosorbide mononitrate (IMDUR) 60 MG 24 hr tablet, TAKE ONE TABLET BY MOUTH DAILY, Disp: 30 tablet, Rfl: 3  •  Magnesium 100 MG capsule, Take  by mouth., Disp: , Rfl:   •  MAGNESIUM CHLORIDE PO, Take  by mouth., Disp: , Rfl:   •  nitroglycerin (NITROSTAT) 0.4 MG SL tablet, 1 under the tongue as needed for angina, may repeat q5mins for up three doses, Disp: 25 tablet, Rfl: 3  •  sacubitril-valsartan (ENTRESTO) 24-26 MG tablet, Take 1 tablet by mouth 2 (Two) Times a Day., Disp: 180 tablet, Rfl: 1  •  tiZANidine (ZANAFLEX) 4 MG tablet, Take 4 mg by mouth at night as needed for muscle spasms., Disp: , Rfl:   •  acetaminophen (TYLENOL) 100 MG/ML solution, Take 10 mg/kg by mouth every 4 (four) hours as needed for fever., Disp: , Rfl:   •  clopidogrel (PLAVIX) 75 MG tablet, Take 1 tablet by mouth Daily., Disp: 90 tablet, Rfl: 0  •  Omega-3 Fatty Acids (FISH OIL) 1000 MG capsule capsule, Take  by mouth daily with breakfast., Disp: , Rfl:       The following portions of the patient's history were reviewed and updated as appropriate:  "allergies, current medications, past family history, past medical history, past social history, past surgical history and problem list.    Social History     Tobacco Use   • Smoking status: Former Smoker     Packs/day: 2.00     Types: Cigarettes     Last attempt to quit:      Years since quittin.3   • Smokeless tobacco: Never Used   Substance Use Topics   • Alcohol use: No   • Drug use: No       Review of Systems   Constitution: Negative for chills and fever.   HENT: Negative for nosebleeds and sore throat.    Cardiovascular: Positive for chest pain. Negative for leg swelling and palpitations.   Respiratory: Positive for shortness of breath. Negative for cough, hemoptysis and wheezing.    Gastrointestinal: Negative for abdominal pain, hematemesis, hematochezia, melena, nausea and vomiting.   Genitourinary: Negative for dysuria and hematuria.   Neurological: Negative for headaches.       Objective   Vitals:    20 1207   BP: 92/56   Pulse: 71   Resp: 16   SpO2: 95%   Weight: 59.4 kg (131 lb)   Height: 149.9 cm (59\")     Body mass index is 26.46 kg/m².        Physical Exam   Constitutional: She is oriented to person, place, and time. She appears well-developed and well-nourished.   HENT:   Mouth/Throat: Oropharynx is clear and moist.   Eyes: Pupils are equal, round, and reactive to light. EOM are normal.   Neck: Neck supple. No JVD present. No tracheal deviation present. No thyromegaly present.   Cardiovascular: Normal rate, regular rhythm, S1 normal and S2 normal. Exam reveals no gallop and no friction rub.   No murmur heard.  Pulmonary/Chest: Effort normal and breath sounds normal.   Abdominal: Soft. Bowel sounds are normal. She exhibits no mass. There is no tenderness.   Musculoskeletal: Normal range of motion. She exhibits no edema.   Lymphadenopathy:     She has no cervical adenopathy.   Neurological: She is alert and oriented to person, place, and time.   Skin: Skin is warm and dry. No rash noted. "   Psychiatric: She has a normal mood and affect.               ECG 12 Lead  Date/Time: 5/13/2020 12:09 PM  Performed by: Krish Ocasio MD  Authorized by: Krish Ocasio MD   Comparison: compared with previous ECG from 11/5/2019  Similar to previous ECG  Comments: 100% biventricular pacing.              Assessment/Plan    Diagnosis Plan   1.  ASCVD, S/P CABG 1997, clinically stable.    2. Ischemic cardiomyopathy with LV ejection fraction of 30-35% in feb 2018 with NYHA class 2-3 dyspnea, appears compensated.     3. Mixed hyperlipidemia          Recommendations:  1. Continue with aspirin, atorvastatin, carvedilol, Imdur and Entresto at current doses.  2. Check CMP    Return in about 3 months (around 8/13/2020).    As always, Kreis, Samuel Duane, MD  I appreciate very much the opportunity to participate in the cardiovascular care of your patients. Please do not hesitate to call me with any questions with regards to Kerri Kinney's evaluation and management.         With Best Regards,        Krish Ocasio MD, [facility]    Please note that portions of this note were completed with a voice recognition program.

## 2020-07-06 ENCOUNTER — APPOINTMENT (OUTPATIENT)
Dept: MAMMOGRAPHY | Facility: HOSPITAL | Age: 75
End: 2020-07-06

## 2020-07-06 ENCOUNTER — APPOINTMENT (OUTPATIENT)
Dept: BONE DENSITY | Facility: HOSPITAL | Age: 75
End: 2020-07-06

## 2020-08-24 ENCOUNTER — APPOINTMENT (OUTPATIENT)
Dept: MAMMOGRAPHY | Facility: HOSPITAL | Age: 75
End: 2020-08-24

## 2020-08-24 ENCOUNTER — APPOINTMENT (OUTPATIENT)
Dept: BONE DENSITY | Facility: HOSPITAL | Age: 75
End: 2020-08-24

## 2020-08-27 ENCOUNTER — HOSPITAL ENCOUNTER (OUTPATIENT)
Dept: BONE DENSITY | Facility: HOSPITAL | Age: 75
Discharge: HOME | End: 2020-08-27

## 2020-08-27 ENCOUNTER — HOSPITAL ENCOUNTER (OUTPATIENT)
Dept: MAMMOGRAPHY | Facility: HOSPITAL | Age: 75
Discharge: HOME | End: 2020-08-27
Admitting: FAMILY MEDICINE

## 2020-08-27 DIAGNOSIS — M81.0 AGE-RELATED OSTEOPOROSIS WITHOUT CURRENT PATHOLOGICAL FRACTURE: ICD-10-CM

## 2020-08-27 DIAGNOSIS — Z12.31 VISIT FOR SCREENING MAMMOGRAM: ICD-10-CM

## 2020-08-27 PROCEDURE — 77080 DXA BONE DENSITY AXIAL: CPT | Performed by: RADIOLOGY

## 2020-08-27 PROCEDURE — 77067 SCR MAMMO BI INCL CAD: CPT

## 2020-08-27 PROCEDURE — 77063 BREAST TOMOSYNTHESIS BI: CPT | Performed by: RADIOLOGY

## 2020-08-27 PROCEDURE — 77067 SCR MAMMO BI INCL CAD: CPT | Performed by: RADIOLOGY

## 2020-08-27 PROCEDURE — 77063 BREAST TOMOSYNTHESIS BI: CPT

## 2020-08-27 PROCEDURE — 77080 DXA BONE DENSITY AXIAL: CPT

## 2020-09-15 ENCOUNTER — OFFICE VISIT (OUTPATIENT)
Dept: CARDIOLOGY | Facility: CLINIC | Age: 75
End: 2020-09-15

## 2020-09-15 VITALS
DIASTOLIC BLOOD PRESSURE: 57 MMHG | HEIGHT: 59 IN | BODY MASS INDEX: 26.81 KG/M2 | TEMPERATURE: 97.5 F | OXYGEN SATURATION: 95 % | HEART RATE: 69 BPM | SYSTOLIC BLOOD PRESSURE: 101 MMHG | WEIGHT: 133 LBS

## 2020-09-15 DIAGNOSIS — I25.5 ISCHEMIC CARDIOMYOPATHY: ICD-10-CM

## 2020-09-15 DIAGNOSIS — Z95.810 PRESENCE OF BIVENTRICULAR IMPLANTABLE CARDIOVERTER-DEFIBRILLATOR (ICD): ICD-10-CM

## 2020-09-15 DIAGNOSIS — I25.10 ASCVD (ARTERIOSCLEROTIC CARDIOVASCULAR DISEASE): Primary | ICD-10-CM

## 2020-09-15 DIAGNOSIS — I50.22 CHRONIC SYSTOLIC HEART FAILURE (HCC): ICD-10-CM

## 2020-09-15 PROCEDURE — 99214 OFFICE O/P EST MOD 30 MIN: CPT | Performed by: INTERNAL MEDICINE

## 2020-09-15 RX ORDER — CARVEDILOL 6.25 MG/1
6.25 TABLET ORAL 2 TIMES DAILY
Qty: 60 TABLET | Refills: 11 | Status: SHIPPED | OUTPATIENT
Start: 2020-09-15 | End: 2022-07-12 | Stop reason: DRUGHIGH

## 2020-09-15 NOTE — PROGRESS NOTES
Kreis, Samuel Duane, MD  Kerri Kinney  1945  09/15/2020    Patient Active Problem List   Diagnosis   •  ASCVD, S/P CABG 1997   • CKD (chronic kidney disease) (stage III)   •  S/P ICD with recent gen change 2009   • Preoperative cardiovascular examination   • History of chronic class II to III congestive heart failure   • Coronary artery disease   • Hypertension   • Depression   • Anxiety   • Hyperlipidemia   • GERD (gastroesophageal reflux disease)   • Dizziness   • Ischemic cardiomyopathy with LV ejection fraction of 30-35% in feb 2018 with NYHA class 2-3 dyspnea.   • Precordial pain   • Presence of biventricular implantable cardioverter-defibrillator (ICD)   • Chronic systolic heart failure, appears compensated.       Dear Kreis, Samuel Duane, MD:    Subjective     Kerri Kinney is a 75 y.o. female with the problems as listed above, presents    Chief Complaint: Follow-up of coronary artery disease and ischemic cardiomyopathy.       History of Present Illness: Jennifer is a very pleasant 75-year-old  female with history of known coronary artery disease, status post previous myocardial infarction, status post CABG 1997 and underlying advanced ischemic cardiomyopathy with LV ejection fraction of about 30 to 35%.  She is here for regular cardiology follow-up.  On today's visit she denies any chest pains.  She has chronic dyspnea with mild exertion with no PND, orthopnea or pedal edema.  She says she gets dizzy and seems to have a tendency to fall.  Her blood pressure tends to run low.    Allergies   Allergen Reactions   • Codeine    • Demerol [Meperidine]    • Penicillins    • Terramycin [Oxytetracycline]    :      Current Outpatient Medications:   •  acetaminophen (TYLENOL) 325 MG tablet, Take 325 mg by mouth Every 4 (Four) Hours As Needed for Fever., Disp: , Rfl:   •  allopurinol (ZYLOPRIM) 100 MG tablet, Take 100 mg by mouth daily., Disp: , Rfl:   •  aspirin 81 MG EC tablet, Take 81 mg by mouth  daily., Disp: , Rfl:   •  atorvastatin (LIPITOR) 40 MG tablet, Take 40 mg by mouth daily., Disp: , Rfl:   •  clonazePAM (KlonoPIN) 0.5 MG tablet, Take 0.5 mg by mouth 2 (two) times a day as needed for seizures., Disp: , Rfl:   •  dexlansoprazole (DEXILANT) 60 MG capsule, Take 60 mg by mouth daily., Disp: , Rfl:   •  diclofenac (VOLTAREN) 1 % gel gel, Apply 4 g topically 4 (Four) Times a Day As Needed., Disp: , Rfl:   •  DULoxetine (CYMBALTA) 60 MG capsule, Take 60 mg by mouth daily., Disp: , Rfl:   •  furosemide (LASIX) 40 MG tablet, Take 40 mg by mouth As Needed., Disp: , Rfl:   •  Garlic 1000 MG capsule, Take 1,000 mg by mouth Daily., Disp: , Rfl:   •  HYDROcodone-acetaminophen (LORTAB) 5-325 MG per tablet, Take 1 tablet by mouth Every 6 (Six) Hours As Needed., Disp: , Rfl:   •  imipramine (TOFRANIL) 10 MG tablet, Take 10 mg by mouth 4 (Four) Times a Day., Disp: , Rfl:   •  isosorbide mononitrate (IMDUR) 60 MG 24 hr tablet, TAKE ONE TABLET BY MOUTH DAILY, Disp: 30 tablet, Rfl: 4  •  Magnesium 100 MG capsule, Take  by mouth., Disp: , Rfl:   •  nitroglycerin (NITROSTAT) 0.4 MG SL tablet, 1 under the tongue as needed for angina, may repeat q5mins for up three doses, Disp: 25 tablet, Rfl: 3  •  sacubitril-valsartan (ENTRESTO) 24-26 MG tablet, Take 1 tablet by mouth 2 (Two) Times a Day., Disp: 180 tablet, Rfl: 1  •  tiZANidine (ZANAFLEX) 4 MG tablet, Take 4 mg by mouth at night as needed for muscle spasms., Disp: , Rfl:   •  carvedilol (COREG) 6.25 MG tablet, Take 1 tablet by mouth 2 (Two) Times a Day., Disp: 60 tablet, Rfl: 11      The following portions of the patient's history were reviewed and updated as appropriate: allergies, current medications, past family history, past medical history, past social history, past surgical history and problem list.    Social History     Tobacco Use   • Smoking status: Former Smoker     Packs/day: 2.00     Types: Cigarettes     Quit date:      Years since quittin.7   •  "Smokeless tobacco: Never Used   Substance Use Topics   • Alcohol use: No   • Drug use: No       Review of Systems   Constitution: Negative for chills and fever.   HENT: Negative for nosebleeds and sore throat.    Cardiovascular: Positive for chest pain and leg swelling (right foot.). Negative for palpitations and syncope.   Respiratory: Negative for cough, hemoptysis, shortness of breath and wheezing.    Gastrointestinal: Negative for abdominal pain, hematemesis, hematochezia, melena, nausea and vomiting.   Genitourinary: Negative for dysuria and hematuria.   Neurological: Negative for dizziness and headaches.       Objective   Vitals:    09/15/20 1229   BP: 101/57   BP Location: Left arm   Patient Position: Sitting   Cuff Size: Adult   Pulse: 69   Temp: 97.5 °F (36.4 °C)   TempSrc: Infrared   SpO2: 95%   Weight: 60.3 kg (133 lb)   Height: 149.9 cm (59\")     Body mass index is 26.86 kg/m².      Constitutional:       Appearance: Well-developed.   Eyes:      Conjunctiva/sclera: Conjunctivae normal.   HENT:      Head: Normocephalic.   Neck:      Musculoskeletal: Normal range of motion and neck supple.      Thyroid: No thyromegaly.      Vascular: No JVD.      Trachea: No tracheal deviation.   Pulmonary:      Effort: No respiratory distress.      Breath sounds: Normal breath sounds. No wheezing. No rales.   Cardiovascular:      Normal rate. Regular rhythm.      Murmurs: There is a grade 3/6 systolic murmur at the URSB and LLSB.      No gallop.   Abdominal:      General: Bowel sounds are normal.      Palpations: Abdomen is soft. There is no abdominal mass.      Tenderness: There is no abdominal tenderness.   Skin:     General: Skin is warm and dry.   Neurological:      Mental Status: Alert and oriented to person, place, and time.      Cranial Nerves: No cranial nerve deficit.         Lab Results   Component Value Date     05/31/2019    K 4.6 05/31/2019    CL 98 05/31/2019    CO2 27 05/31/2019    BUN 40 (H) " 05/31/2019    CREATININE 1.15 (H) 05/31/2019    GLUCOSE 88 03/08/2017    CALCIUM 9.9 05/31/2019    AST 36 05/31/2019    ALT 29 05/31/2019    ALKPHOS 105 05/31/2019    LABIL2 2.8 (H) 05/31/2019     No results found for: CKTOTAL  Lab Results   Component Value Date    WBC 6.08 05/31/2016    HGB 13.1 05/31/2016    HCT 38.6 05/31/2016     05/31/2016     Lab Results   Component Value Date    INR 1.00 05/31/2016       Assessment/Plan :   Diagnosis Plan   1.  ASCVD, S/P CABG 1997 clinically stable.     2. Ischemic cardiomyopathy with LV ejection fraction of 30-35% in feb 2018 with NYHA class 2-3 dyspnea.     3. Chronic systolic heart failure, appears compensated.     4. Presence of biventricular implantable cardioverter-defibrillator (ICD)         Recommendations:  1. Since her blood pressure is running relatively low with complaints of dizziness, will decrease the dose of carvedilol to 6.25 mg p.o. twice daily and continue with Entresto 24/26 mg p.o. twice daily.  2. Continue with aspirin and atorvastatin at current doses.    Return in about 5 months (around 2/15/2021) for or sooner if needed.    As always, Du  I appreciate very much the opportunity to participate in the cardiovascular care of your patients. Please do not hesitate to call me with any questions with regards to Kerri Kinney's evaluation and management.         With Best Regards,        Krish Ocasio MD, FACC    Please note that portions of this note were completed with a voice recognition program.

## 2020-09-24 DIAGNOSIS — I50.22 CHRONIC SYSTOLIC CONGESTIVE HEART FAILURE (HCC): ICD-10-CM

## 2020-09-24 RX ORDER — ISOSORBIDE MONONITRATE 60 MG/1
TABLET, EXTENDED RELEASE ORAL
Qty: 30 TABLET | Refills: 6 | Status: SHIPPED | OUTPATIENT
Start: 2020-09-24 | End: 2021-07-06

## 2020-09-24 RX ORDER — SACUBITRIL AND VALSARTAN 24; 26 MG/1; MG/1
TABLET, FILM COATED ORAL
Qty: 60 TABLET | Refills: 6 | Status: SHIPPED | OUTPATIENT
Start: 2020-09-24 | End: 2021-04-21

## 2021-02-17 DIAGNOSIS — Z23 IMMUNIZATION DUE: ICD-10-CM

## 2021-02-26 ENCOUNTER — IMMUNIZATION (OUTPATIENT)
Dept: VACCINE CLINIC | Facility: HOSPITAL | Age: 76
End: 2021-02-26

## 2021-02-26 PROCEDURE — 0001A: CPT | Performed by: INTERNAL MEDICINE

## 2021-02-26 PROCEDURE — 91300 HC SARSCOV02 VAC 30MCG/0.3ML IM: CPT | Performed by: INTERNAL MEDICINE

## 2021-03-03 ENCOUNTER — HOSPITAL ENCOUNTER (OUTPATIENT)
Dept: HOSPITAL 79 - KOH-I | Age: 76
End: 2021-03-03
Attending: NURSE PRACTITIONER
Payer: MEDICARE

## 2021-03-03 DIAGNOSIS — M25.511: Primary | ICD-10-CM

## 2021-03-05 ENCOUNTER — OFFICE VISIT (OUTPATIENT)
Dept: CARDIOLOGY | Facility: CLINIC | Age: 76
End: 2021-03-05

## 2021-03-05 VITALS
SYSTOLIC BLOOD PRESSURE: 130 MMHG | HEIGHT: 58 IN | TEMPERATURE: 96.9 F | HEART RATE: 79 BPM | WEIGHT: 130.8 LBS | RESPIRATION RATE: 16 BRPM | DIASTOLIC BLOOD PRESSURE: 61 MMHG | BODY MASS INDEX: 27.46 KG/M2

## 2021-03-05 DIAGNOSIS — I25.5 ISCHEMIC CARDIOMYOPATHY: ICD-10-CM

## 2021-03-05 DIAGNOSIS — I50.22 CHRONIC SYSTOLIC HEART FAILURE (HCC): ICD-10-CM

## 2021-03-05 DIAGNOSIS — Z95.810 PRESENCE OF BIVENTRICULAR IMPLANTABLE CARDIOVERTER-DEFIBRILLATOR (ICD): ICD-10-CM

## 2021-03-05 DIAGNOSIS — N18.31 STAGE 3A CHRONIC KIDNEY DISEASE (HCC): ICD-10-CM

## 2021-03-05 DIAGNOSIS — I25.10 ASCVD (ARTERIOSCLEROTIC CARDIOVASCULAR DISEASE): Primary | ICD-10-CM

## 2021-03-05 PROCEDURE — 93000 ELECTROCARDIOGRAM COMPLETE: CPT | Performed by: INTERNAL MEDICINE

## 2021-03-05 PROCEDURE — 99213 OFFICE O/P EST LOW 20 MIN: CPT | Performed by: INTERNAL MEDICINE

## 2021-03-05 RX ORDER — DAPAGLIFLOZIN 5 MG/1
5 TABLET, FILM COATED ORAL DAILY
COMMUNITY

## 2021-03-05 RX ORDER — CHLORAL HYDRATE 500 MG
CAPSULE ORAL
COMMUNITY

## 2021-03-05 RX ORDER — GABAPENTIN 300 MG/1
300 CAPSULE ORAL 3 TIMES DAILY
COMMUNITY

## 2021-03-05 NOTE — PROGRESS NOTES
Kreis, Samuel Duane, MD  Kerri Kinney  2021    Patient Active Problem List   Diagnosis   •  ASCVD, S/P CABG    • CKD (chronic kidney disease) (stage III)   •  S/P ICD with recent gen change    • Preoperative cardiovascular examination   • History of chronic class II to III congestive heart failure   • Coronary artery disease   • Hypertension   • Depression   • Anxiety   • Hyperlipidemia   • GERD (gastroesophageal reflux disease)   • Dizziness   • Ischemic cardiomyopathy with LV ejection fraction of 30-35% in 2018 with NYHA class 2-3 dyspnea.   • Precordial pain   • Presence of biventricular implantable cardioverter-defibrillator (ICD)   • Chronic systolic heart failure, appears compensated.       Dear Kreis, Samuel Duane, MD:    Subjective     Kerri Kinney is a 75 y.o. female with the problems as listed above, presents    Chief complaint: Follow-up of coronary artery disease and ischemic cardiomyopathy.    History of Present Illness: Ms. Kinney is a very pleasant 75-year-old  female with history of known coronary artery disease with previous myocardial infarction's, with underlying advanced ischemic cardiomyopathy, status post ICD implantation and previous history of CABG  is here for regular cardiology follow-up.  On today's visit she complains of feeling tired.  She states her blood pressure at times runs very low.  When she was at Dr. Torres's office recently, her blood pressure was reportedly noted to be in the 80s on the top.  Her furosemide dose was decreased.  She is also on carvedilol 12.5 mg p.o. twice daily and Entresto 24/26 mg p.o. twice daily.      Kerri Kinney  Regadenoson Stress Test With Myocardial Perfusion SPECT (Multi Study)  Order# [id number]  Reading physician:   Krish Ocasio MD Ordering physician:   Krish Ocasio MD Study date: 20   Patient Information    Patient Name   Kerri Kinney MRN   [id number] Sex   Female  (Age)    1945 (75 y.o.)   Interpretation Summary    · A pharmacological stress test was performed using regadenoson with low-level exercise.  · Findings consistent with an indeterminate ECG stress test.  · Myocardial perfusion imaging indicates a medium-sized infarct located in the anterior wall and apex with no significant ischemia noted.  · Enlarged LV cavity size. Abnormal LV wall motion consistent with dyskinesis of the anterior wall and LV apex.  · Left ventricular ejection fraction is severely reduced (Calculated EF = 32%).  · Impressions are consistent with an intermediate risk study.       Allergies   Allergen Reactions   • Codeine    • Demerol [Meperidine]    • Penicillins    • Terramycin [Oxytetracycline]    :      Current Outpatient Medications:   •  acetaminophen (TYLENOL) 325 MG tablet, Take 325 mg by mouth Every 4 (Four) Hours As Needed for Fever., Disp: , Rfl:   •  allopurinol (ZYLOPRIM) 100 MG tablet, Take 100 mg by mouth daily., Disp: , Rfl:   •  Apoaequorin (PREVAGEN PO), Take  by mouth Daily., Disp: , Rfl:   •  aspirin 81 MG EC tablet, Take 81 mg by mouth daily., Disp: , Rfl:   •  atorvastatin (LIPITOR) 40 MG tablet, Take 40 mg by mouth daily., Disp: , Rfl:   •  carvedilol (COREG) 6.25 MG tablet, Take 1 tablet by mouth 2 (Two) Times a Day. (Patient taking differently: Take 12.5 mg by mouth 2 (Two) Times a Day.), Disp: 60 tablet, Rfl: 11  •  clonazePAM (KlonoPIN) 0.5 MG tablet, Take 0.5 mg by mouth 2 (two) times a day as needed for seizures., Disp: , Rfl:   •  dapagliflozin (Farxiga) 5 MG tablet tablet, Take 5 mg by mouth Daily., Disp: , Rfl:   •  dexlansoprazole (DEXILANT) 60 MG capsule, Take 60 mg by mouth daily., Disp: , Rfl:   •  diclofenac (VOLTAREN) 1 % gel gel, Apply 4 g topically 4 (Four) Times a Day As Needed., Disp: , Rfl:   •  DULoxetine (CYMBALTA) 60 MG capsule, Take 60 mg by mouth daily., Disp: , Rfl:   •  Entresto 24-26 MG tablet, TAKE ONE TABLET BY MOUTH TWICE A DAY, Disp: 60 tablet,  "Rfl: 6  •  furosemide (LASIX) 40 MG tablet, Take 40 mg by mouth As Needed., Disp: , Rfl:   •  gabapentin (NEURONTIN) 300 MG capsule, Take 300 mg by mouth 3 (Three) Times a Day., Disp: , Rfl:   •  Garlic 1000 MG capsule, Take 1,000 mg by mouth Daily., Disp: , Rfl:   •  HYDROcodone-acetaminophen (LORTAB) 5-325 MG per tablet, Take 1 tablet by mouth Every 6 (Six) Hours As Needed., Disp: , Rfl:   •  imipramine (TOFRANIL) 10 MG tablet, Take 10 mg by mouth 4 (Four) Times a Day., Disp: , Rfl:   •  isosorbide mononitrate (IMDUR) 60 MG 24 hr tablet, TAKE ONE TABLET BY MOUTH DAILY, Disp: 30 tablet, Rfl: 6  •  Magnesium 100 MG capsule, Take  by mouth., Disp: , Rfl:   •  nitroglycerin (NITROSTAT) 0.4 MG SL tablet, 1 under the tongue as needed for angina, may repeat q5mins for up three doses, Disp: 25 tablet, Rfl: 3  •  Omega-3 Fatty Acids (fish oil) 1000 MG capsule capsule, Take  by mouth Daily With Breakfast., Disp: , Rfl:   •  tiZANidine (ZANAFLEX) 4 MG tablet, Take 4 mg by mouth at night as needed for muscle spasms., Disp: , Rfl:       The following portions of the patient's history were reviewed and updated as appropriate: allergies, current medications, past family history, past medical history, past social history, past surgical history and problem list.    Social History     Tobacco Use   • Smoking status: Former Smoker     Packs/day: 2.00     Types: Cigarettes     Quit date:      Years since quittin.1   • Smokeless tobacco: Never Used   Substance Use Topics   • Alcohol use: No   • Drug use: No       Review of Systems   Cardiovascular: Positive for chest pain and leg swelling. Negative for palpitations.   Respiratory: Positive for shortness of breath.        Objective   Vitals:    21 1200   BP: 130/61   Pulse: 79   Resp: 16   Temp: 96.9 °F (36.1 °C)   Weight: 59.3 kg (130 lb 12.8 oz)   Height: 147.3 cm (58\")     Body mass index is 27.34 kg/m².    Vitals signs reviewed.   Constitutional:       Appearance: " Well-developed.   Eyes:      Pupils: Pupils are equal, round, and reactive to light.   Neck:      Musculoskeletal: Neck supple.      Thyroid: No thyromegaly.      Vascular: No JVD.      Trachea: No tracheal deviation.      Lymphadenopathy: No cervical adenopathy.   Pulmonary:      Effort: Pulmonary effort is normal.      Breath sounds: Normal breath sounds.   Cardiovascular:      PMI at left midclavicular line. Normal rate. Regular rhythm. Normal S1. Normal S2.      Murmurs: There is no murmur.      No gallop. No click. No rub.   Pulses:     Intact distal pulses.   Edema:     Peripheral edema absent.   Abdominal:      General: Bowel sounds are normal.      Palpations: Abdomen is soft. There is no abdominal mass.      Tenderness: There is no abdominal tenderness.   Musculoskeletal: Normal range of motion.   Skin:     General: Skin is warm and dry.      Findings: No rash.   Neurological:      Mental Status: Alert and oriented to person, place, and time.       Lab Results   Component Value Date     05/31/2019    K 4.6 05/31/2019    CL 98 05/31/2019    CO2 27 05/31/2019    BUN 40 (H) 05/31/2019    CREATININE 1.15 (H) 05/31/2019    GLUCOSE 88 03/08/2017    CALCIUM 9.9 05/31/2019    AST 36 05/31/2019    ALT 29 05/31/2019    ALKPHOS 105 05/31/2019    LABIL2 2.8 (H) 05/31/2019     No results found for: CKTOTAL  Lab Results   Component Value Date    WBC 6.08 05/31/2016    HGB 13.1 05/31/2016    HCT 38.6 05/31/2016     05/31/2016     Lab Results   Component Value Date    INR 1.00 05/31/2016       ECG 12 Lead    Date/Time: 3/5/2021 12:03 PM  Performed by: Krish Ocasio MD  Authorized by: Krish Ocasio MD   Comparison: compared with previous ECG from 5/13/2020  Similar to previous ECG  Comments: Appropriate AV sequential pacing.                Assessment/Plan :   Diagnosis Plan   1.  ASCVD, S/P CABG 1997 clinically stable.     2. Ischemic cardiomyopathy with LV ejection fraction of 30-35% in feb 2018 with  NYHA class 2-3 dyspnea.     3. Chronic systolic heart failure, appears compensated.     4. Presence of biventricular implantable cardioverter-defibrillator (ICD)     5. Stage 3a chronic kidney disease (CMS/[facility])          Recommendations:  1. Since her blood pressure is running low at times associated with symptoms of fatigue, will decrease her dose of her carvedilol to 6.25 mg p.o. twice daily.    2. Continue with Entresto at current dose.    Return in about 3 months (around 6/5/2021).    As always, Du  I appreciate very much the opportunity to participate in the cardiovascular care of your patients. Please do not hesitate to call me with any questions with regards to Kerri Kinney's evaluation and management.       With Best Regards,        Krish Ocasio MD, [facility]    Please note that portions of this note were completed with a voice recognition program.

## 2021-03-19 ENCOUNTER — IMMUNIZATION (OUTPATIENT)
Dept: VACCINE CLINIC | Facility: HOSPITAL | Age: 76
End: 2021-03-19

## 2021-03-19 PROCEDURE — 91300 HC SARSCOV02 VAC 30MCG/0.3ML IM: CPT | Performed by: INTERNAL MEDICINE

## 2021-03-19 PROCEDURE — 0002A: CPT | Performed by: INTERNAL MEDICINE

## 2021-03-31 ENCOUNTER — HOSPITAL ENCOUNTER (OUTPATIENT)
Dept: HOSPITAL 79 - KOH-I | Age: 76
End: 2021-03-31
Attending: FAMILY MEDICINE
Payer: MEDICARE

## 2021-03-31 DIAGNOSIS — M16.12: ICD-10-CM

## 2021-03-31 DIAGNOSIS — M25.552: Primary | ICD-10-CM

## 2021-04-19 DIAGNOSIS — I50.22 CHRONIC SYSTOLIC CONGESTIVE HEART FAILURE (HCC): ICD-10-CM

## 2021-04-21 RX ORDER — SACUBITRIL AND VALSARTAN 24; 26 MG/1; MG/1
TABLET, FILM COATED ORAL
Qty: 60 TABLET | Refills: 5 | Status: SHIPPED | OUTPATIENT
Start: 2021-04-21 | End: 2021-11-17 | Stop reason: SDUPTHER

## 2021-07-06 RX ORDER — ISOSORBIDE MONONITRATE 60 MG/1
TABLET, EXTENDED RELEASE ORAL
Qty: 30 TABLET | Refills: 5 | Status: SHIPPED | OUTPATIENT
Start: 2021-07-06 | End: 2022-01-31

## 2021-07-13 ENCOUNTER — OFFICE VISIT (OUTPATIENT)
Dept: CARDIOLOGY | Facility: CLINIC | Age: 76
End: 2021-07-13

## 2021-07-13 VITALS
TEMPERATURE: 97.3 F | BODY MASS INDEX: 27.88 KG/M2 | SYSTOLIC BLOOD PRESSURE: 116 MMHG | WEIGHT: 132.8 LBS | DIASTOLIC BLOOD PRESSURE: 64 MMHG | HEIGHT: 58 IN | HEART RATE: 77 BPM

## 2021-07-13 DIAGNOSIS — I25.5 ISCHEMIC CARDIOMYOPATHY: ICD-10-CM

## 2021-07-13 DIAGNOSIS — R07.2 PRECORDIAL PAIN: ICD-10-CM

## 2021-07-13 DIAGNOSIS — I25.10 ASCVD (ARTERIOSCLEROTIC CARDIOVASCULAR DISEASE): ICD-10-CM

## 2021-07-13 DIAGNOSIS — I50.22 CHRONIC SYSTOLIC CONGESTIVE HEART FAILURE (HCC): Primary | ICD-10-CM

## 2021-07-13 DIAGNOSIS — R06.09 DYSPNEA ON EXERTION: ICD-10-CM

## 2021-07-13 DIAGNOSIS — N18.31 STAGE 3A CHRONIC KIDNEY DISEASE (HCC): ICD-10-CM

## 2021-07-13 PROCEDURE — 99214 OFFICE O/P EST MOD 30 MIN: CPT | Performed by: INTERNAL MEDICINE

## 2021-07-13 RX ORDER — BUMETANIDE 1 MG/1
1 TABLET ORAL DAILY
Qty: 30 TABLET | Refills: 2 | Status: SHIPPED | OUTPATIENT
Start: 2021-07-13 | End: 2021-07-13 | Stop reason: SDUPTHER

## 2021-07-13 NOTE — PROGRESS NOTES
Kreis, Samuel Duane, MD  Kerri Kinney  1945 07/13/2021    Patient Active Problem List   Diagnosis   •  ASCVD, S/P CABG 1997   • CKD (chronic kidney disease) (stage III)   •  S/P ICD with recent gen change 2009   • Preoperative cardiovascular examination   • History of chronic class II to III congestive heart failure   • Coronary artery disease   • Hypertension   • Depression   • Anxiety   • Hyperlipidemia   • GERD (gastroesophageal reflux disease)   • Dizziness   • Ischemic cardiomyopathy with LV ejection fraction of 30-35% in feb 2018 with NYHA class 2-3 dyspnea.   • Precordial pain   • Presence of biventricular implantable cardioverter-defibrillator (ICD)   • Chronic systolic heart failure, appears compensated.       Dear Kreis, Samuel Duane, MD:    Subjective     Kerri Kinney is a 76 y.o. female with the problems as listed above, presents    Chief complaint: Increasing shortness of breath recently and follow-up for her coronary artery disease and ischemic cardiomyopathy.    History of Present Illness: Ms. Kinney is a very pleasant 76-year-old  female with history of known coronary artery disease with previous myocardial infarction's, status post CABG in 1997 with underlying advanced ischemic cardiomyopathy with LV ejection fraction of about 31 to 35% on last echo Doppler study in June 2019.  On today's visit, she complains of having some increasing shortness of breath recently to the point that she gets short of breath just doing activities of daily living in the house.  She denies any significant orthopnea or leg edema recently.  She has gained about 2 pounds since 3/5/2021.  She denies any complaints of chest pains.  Her Lexiscan scan sestamibi study in February 2020 revealed old anterior apical myocardial scar with no evidence of myocardial ischemia.  He also complains of intermittent episodes of chest pain and discomfort which seem to come and go spontaneously.  She does admit to eating  bologna intermittently.    Kerri Kinney  Echo Complete w/Doppler and Color Flow  Order# [id number]  Reading physician: Krish Ocasio MD Ordering physician: Kreis, Samuel Duane, MD Study date: 19   Patient Information    Patient Name   Kerri Kinney MRN   [id number] Legal Sex   Female  (Age)   1945 (76 y.o.)   Interpretation Summary    · The left ventricular cavity is mildly dilated.  · The following left ventricular wall segments are hypokinetic: basal inferolateral, mid inferolateral, basal inferoseptal, mid inferoseptal, mid anteroseptal and basal inferoseptal. The following left ventricular wall segments are dyskinetic: apical anterior, apical lateral, apical inferior, apical septal and apex.  · Estimated EF appears to be in the range of 31 - 35%.  · The aortic valve appears trileaflet. The aortic valve is abnormal in structure. There is mild calcification of the aortic valve.Moderate aortic valve regurgitation is present. Mild aortic valve stenosis is present.  · The mitral valve is abnormal in structure. Mild MAC is present. Mild mitral valve regurgitation is present. Mild mitral valve stenosis is present.  · The tricuspid valve is normal. No evidence of tricuspid valve stenosis is present. Mild to moderate tricuspid valve regurgitation is present. Estimated right ventricular systolic pressure from tricuspid regurgitation is normal (<35 mmHg).  · There is no evidence of pericardial effusion.  · Comments: No significant change since 2018. Her pulmonary hypertension appears to be better on this study.      Kerri Kinney  Regadenoson Stress Test With Myocardial Perfusion SPECT (Multi Study)  Order# [id number]  Reading physician: Krish Ocasio MD Ordering physician: Krish Ocasio MD Study date: 20   Patient Information    Patient Name   Kerri Kinney MRN   [id number] Legal Sex   Female  (Age)   1945 (76 y.o.)   Interpretation Summary    · A pharmacological stress test  was performed using regadenoson with low-level exercise.  · Findings consistent with an indeterminate ECG stress test.  · Myocardial perfusion imaging indicates a medium-sized infarct located in the anterior wall and apex with no significant ischemia noted.  · Enlarged LV cavity size. Abnormal LV wall motion consistent with dyskinesis of the anterior wall and LV apex.  · Left ventricular ejection fraction is severely reduced (Calculated EF = 32%).  · Impressions are consistent with an intermediate risk study.          Allergies   Allergen Reactions   • Codeine    • Demerol [Meperidine]    • Penicillins    • Terramycin [Oxytetracycline]    :    Current Outpatient Medications:   •  acetaminophen (TYLENOL) 325 MG tablet, Take 325 mg by mouth Every 4 (Four) Hours As Needed for Fever., Disp: , Rfl:   •  allopurinol (ZYLOPRIM) 100 MG tablet, Take 100 mg by mouth daily., Disp: , Rfl:   •  Apoaequorin (PREVAGEN PO), Take 10 mg by mouth Daily., Disp: , Rfl:   •  aspirin 81 MG EC tablet, Take 81 mg by mouth daily., Disp: , Rfl:   •  atorvastatin (LIPITOR) 40 MG tablet, Take 40 mg by mouth daily., Disp: , Rfl:   •  carvedilol (COREG) 6.25 MG tablet, Take 1 tablet by mouth 2 (Two) Times a Day. (Patient taking differently: Take 12.5 mg by mouth 2 (Two) Times a Day.), Disp: 60 tablet, Rfl: 11  •  clonazePAM (KlonoPIN) 0.5 MG tablet, Take 0.5 mg by mouth 2 (two) times a day as needed for seizures., Disp: , Rfl:   •  dapagliflozin (Farxiga) 5 MG tablet tablet, Take 5 mg by mouth Daily., Disp: , Rfl:   •  dexlansoprazole (DEXILANT) 60 MG capsule, Take 60 mg by mouth daily., Disp: , Rfl:   •  diclofenac (VOLTAREN) 1 % gel gel, Apply 4 g topically 4 (Four) Times a Day As Needed., Disp: , Rfl:   •  DULoxetine (CYMBALTA) 60 MG capsule, Take 60 mg by mouth daily., Disp: , Rfl:   •  Entresto 24-26 MG tablet, TAKE ONE TABLET BY MOUTH TWICE A DAY, Disp: 60 tablet, Rfl: 5  •  gabapentin (NEURONTIN) 300 MG capsule, Take 300 mg by mouth 3  "(Three) Times a Day., Disp: , Rfl:   •  Garlic 1000 MG capsule, Take 1,000 mg by mouth Daily., Disp: , Rfl:   •  HYDROcodone-acetaminophen (LORTAB) 5-325 MG per tablet, Take 1 tablet by mouth Every 6 (Six) Hours As Needed., Disp: , Rfl:   •  imipramine (TOFRANIL) 10 MG tablet, Take 10 mg by mouth 4 (Four) Times a Day., Disp: , Rfl:   •  isosorbide mononitrate (IMDUR) 60 MG 24 hr tablet, TAKE ONE TABLET BY MOUTH DAILY, Disp: 30 tablet, Rfl: 5  •  Magnesium 100 MG capsule, Take  by mouth., Disp: , Rfl:   •  nitroglycerin (NITROSTAT) 0.4 MG SL tablet, 1 under the tongue as needed for angina, may repeat q5mins for up three doses, Disp: 25 tablet, Rfl: 3  •  Omega-3 Fatty Acids (fish oil) 1000 MG capsule capsule, Take  by mouth Daily With Breakfast., Disp: , Rfl:   •  tiZANidine (ZANAFLEX) 4 MG tablet, Take 4 mg by mouth at night as needed for muscle spasms., Disp: , Rfl:   •  bumetanide (BUMEX) 1 MG tablet, Take 1 tablet by mouth Daily. Take 2 tablets daily for 2 days and then 1 tablet daily after that., Disp: 30 tablet, Rfl: 2      The following portions of the patient's history were reviewed and updated as appropriate: allergies, current medications, past family history, past medical history, past social history, past surgical history and problem list.    Social History     Tobacco Use   • Smoking status: Former Smoker     Packs/day: 2.00     Types: Cigarettes     Quit date:      Years since quittin.5   • Smokeless tobacco: Never Used   Substance Use Topics   • Alcohol use: No   • Drug use: No     Review of Systems   Constitutional: Negative for fever.   Cardiovascular: Positive for dyspnea on exertion. Negative for chest pain and leg swelling.   Respiratory: Positive for shortness of breath. Negative for cough.      Objective   Vitals:    21 1227   BP: 116/64   Pulse: 77   Temp: 97.3 °F (36.3 °C)   Weight: 60.2 kg (132 lb 12.8 oz)   Height: 147.3 cm (57.99\")     Body mass index is 27.76 " kg/m².    Vitals reviewed.   Constitutional:       Appearance: Well-developed.   Eyes:      Conjunctiva/sclera: Conjunctivae normal.   HENT:      Head: Normocephalic.   Neck:      Thyroid: No thyromegaly.      Vascular: No JVD.      Trachea: No tracheal deviation.   Pulmonary:      Effort: No respiratory distress.      Breath sounds: Normal breath sounds. No wheezing. No rales.   Cardiovascular:      PMI at left midclavicular line. Normal rate. Regular rhythm. Normal S1. Normal S2.      Murmurs: There is a grade 3/6 systolic murmur at the LLSB.      No gallop. No click. No rub.   Pulses:     Intact distal pulses.   Edema:     Peripheral edema absent.   Abdominal:      General: Bowel sounds are normal.      Palpations: Abdomen is soft. There is no abdominal mass.      Tenderness: There is no abdominal tenderness.   Musculoskeletal:      Cervical back: Normal range of motion and neck supple. Skin:     General: Skin is warm and dry.   Neurological:      Mental Status: Alert and oriented to person, place, and time.      Cranial Nerves: No cranial nerve deficit.         Assessment/Plan :   Diagnosis Plan   1. Chronic systolic congestive heart failure (CMS/HCC)     2. Dyspnea on exertion (NYHA class III)  BNP, CXR   3. Precordial pain  Stress Test With Myocardial Perfusion (1 Day)   4. Ischemic cardiomyopathy with LV ejection fraction of 30-35% in feb 2018 with NYHA class 2-3 dyspnea.     5.  ASCVD, S/P CABG 1997 clinically stable.     6. Stage 3a chronic kidney disease (CMS/HCC)  Basic Metabolic Panel        Recommendations:  1. Since patient is complaining of increasing shortness of breath, we will obtain proBNP and a chest x-ray.  2. We will change her from furosemide to bumetanide 2 mg daily for 2 days and then 1 mg daily.  3. Check basic panel in 3 to 4 days.  4. I have instructed her about avoiding salt rich foods such as bologna which she says she is likes to eat, hamburgers and cheeseburgers, canned vegetables and  soups and all chips and pickles etc.  5. We will evaluate for any occult myocardial ischemia causing her increasing dyspnea with a Lexiscan sestamibi study.    Return in about 3 weeks (around 8/3/2021).    As always,   I appreciate very much the opportunity to participate in the cardiovascular care of your patients. Please do not hesitate to call me with any questions with regards to Kerri Kinney's evaluation and management.       With Best Regards,        Krish Ocasio MD, FACC    Please note that portions of this note were completed with a voice recognition program.

## 2021-07-14 RX ORDER — BUMETANIDE 1 MG/1
1 TABLET ORAL DAILY
Qty: 30 TABLET | Refills: 2 | Status: SHIPPED | OUTPATIENT
Start: 2021-07-14

## 2021-07-15 ENCOUNTER — HOSPITAL ENCOUNTER (OUTPATIENT)
Dept: GENERAL RADIOLOGY | Facility: HOSPITAL | Age: 76
Discharge: HOME | End: 2021-07-15

## 2021-07-15 ENCOUNTER — HOSPITAL ENCOUNTER (OUTPATIENT)
Dept: CARDIOLOGY | Facility: HOSPITAL | Age: 76
Discharge: HOME | End: 2021-07-15

## 2021-07-15 ENCOUNTER — HOSPITAL ENCOUNTER (OUTPATIENT)
Dept: NUCLEAR MEDICINE | Facility: HOSPITAL | Age: 76
Discharge: HOME | End: 2021-07-15

## 2021-07-15 ENCOUNTER — LAB (OUTPATIENT)
Dept: LAB | Facility: HOSPITAL | Age: 76
End: 2021-07-15

## 2021-07-15 DIAGNOSIS — R06.09 DYSPNEA ON EXERTION: ICD-10-CM

## 2021-07-15 DIAGNOSIS — R07.2 PRECORDIAL PAIN: ICD-10-CM

## 2021-07-15 LAB
BH CV NUCLEAR PRIOR STUDY: 3
BH CV REST NUCLEAR ISOTOPE DOSE: 10.4 MCI
BH CV STRESS BP STAGE 1: NORMAL
BH CV STRESS BP STAGE 2: NORMAL
BH CV STRESS COMMENTS STAGE 1: NORMAL
BH CV STRESS COMMENTS STAGE 2: NORMAL
BH CV STRESS DOSE REGADENOSON STAGE 1: 0.4
BH CV STRESS DURATION MIN STAGE 1: 0
BH CV STRESS DURATION MIN STAGE 2: 4
BH CV STRESS DURATION SEC STAGE 1: 10
BH CV STRESS DURATION SEC STAGE 2: 0
BH CV STRESS HR STAGE 1: 80
BH CV STRESS HR STAGE 2: 87
BH CV STRESS NUCLEAR ISOTOPE DOSE: 30.4 MCI
BH CV STRESS PROTOCOL 1: NORMAL
BH CV STRESS RECOVERY BP: NORMAL MMHG
BH CV STRESS RECOVERY HR: 78 BPM
BH CV STRESS STAGE 1: 1
BH CV STRESS STAGE 2: 2
LV EF NUC BP: 29 %
MAXIMAL PREDICTED HEART RATE: 145 BPM
PERCENT MAX PREDICTED HR: 60 %
STRESS BASELINE BP: NORMAL MMHG
STRESS BASELINE HR: 69 BPM
STRESS PERCENT HR: 71 %
STRESS POST PEAK BP: NORMAL MMHG
STRESS POST PEAK HR: 87 BPM
STRESS TARGET HR: 123 BPM

## 2021-07-15 PROCEDURE — 71046 X-RAY EXAM CHEST 2 VIEWS: CPT

## 2021-07-15 PROCEDURE — A9500 TC99M SESTAMIBI: HCPCS | Performed by: INTERNAL MEDICINE

## 2021-07-15 PROCEDURE — 0 TECHNETIUM SESTAMIBI: Performed by: INTERNAL MEDICINE

## 2021-07-15 PROCEDURE — 83880 ASSAY OF NATRIURETIC PEPTIDE: CPT

## 2021-07-15 PROCEDURE — 78452 HT MUSCLE IMAGE SPECT MULT: CPT

## 2021-07-15 PROCEDURE — 78452 HT MUSCLE IMAGE SPECT MULT: CPT | Performed by: INTERNAL MEDICINE

## 2021-07-15 PROCEDURE — 93017 CV STRESS TEST TRACING ONLY: CPT

## 2021-07-15 PROCEDURE — 80048 BASIC METABOLIC PNL TOTAL CA: CPT | Performed by: INTERNAL MEDICINE

## 2021-07-15 PROCEDURE — 25010000002 REGADENOSON 0.4 MG/5ML SOLUTION: Performed by: INTERNAL MEDICINE

## 2021-07-15 PROCEDURE — 93018 CV STRESS TEST I&R ONLY: CPT | Performed by: INTERNAL MEDICINE

## 2021-07-15 PROCEDURE — 71046 X-RAY EXAM CHEST 2 VIEWS: CPT | Performed by: RADIOLOGY

## 2021-07-15 RX ADMIN — TECHNETIUM TC 99M SESTAMIBI 1 DOSE: 1 INJECTION INTRAVENOUS at 09:17

## 2021-07-15 RX ADMIN — TECHNETIUM TC 99M SESTAMIBI 1 DOSE: 1 INJECTION INTRAVENOUS at 11:51

## 2021-07-15 RX ADMIN — REGADENOSON 0.4 MG: 0.08 INJECTION, SOLUTION INTRAVENOUS at 11:51

## 2021-07-16 LAB — NT-PROBNP SERPL-MCNC: 352.7 PG/ML (ref 0–1800)

## 2021-08-17 ENCOUNTER — OFFICE VISIT (OUTPATIENT)
Dept: CARDIOLOGY | Facility: CLINIC | Age: 76
End: 2021-08-17

## 2021-08-17 VITALS
HEART RATE: 76 BPM | HEIGHT: 58 IN | TEMPERATURE: 97.1 F | SYSTOLIC BLOOD PRESSURE: 108 MMHG | WEIGHT: 131.2 LBS | DIASTOLIC BLOOD PRESSURE: 67 MMHG | BODY MASS INDEX: 27.54 KG/M2

## 2021-08-17 DIAGNOSIS — I25.10 ASCVD (ARTERIOSCLEROTIC CARDIOVASCULAR DISEASE): ICD-10-CM

## 2021-08-17 DIAGNOSIS — I50.22 CHRONIC SYSTOLIC HEART FAILURE (HCC): Primary | ICD-10-CM

## 2021-08-17 PROCEDURE — 99213 OFFICE O/P EST LOW 20 MIN: CPT | Performed by: PHYSICIAN ASSISTANT

## 2021-08-17 NOTE — PROGRESS NOTES
Kreis, Samuel Duane, MD  Kerri Kinney  1945 08/17/2021    Patient Active Problem List   Diagnosis   •  ASCVD, S/P CABG 1997   • CKD (chronic kidney disease) (stage III)   •  S/P ICD with recent gen change 2009   • Preoperative cardiovascular examination   • History of chronic class II to III congestive heart failure   • Coronary artery disease   • Hypertension   • Depression   • Anxiety   • Hyperlipidemia   • GERD (gastroesophageal reflux disease)   • Dizziness   • Ischemic cardiomyopathy with LV ejection fraction of 30-35% in feb 2018 with NYHA class 2-3 dyspnea.   • Precordial pain   • Presence of biventricular implantable cardioverter-defibrillator (ICD)   • Chronic systolic heart failure, appears compensated.       Dear Kreis, Samuel Duane, MD:    Subjective     History of Present Illness:    Chief Complaint   Patient presents with   • Follow-up     3 week follow up CXR, LABS, Stress   • Med Management     pt provided list       Kerri Kinney is a pleasant 76 y.o. female with a past medical history significant for known coronary artery disease with previous myocardial infarction status post CABG in 1997.  She also has ischemic cardiomyopathy with HFrEF of 31 to 35%, she also now has a defibrillator placed due to severe ischemic cardiomyopathy and follows with EP Dr. Andrade at .  She comes in today for follow-up on recent stress test.    Patient stress test did reveal a large area of infarction with no new ischemia.  This is essentially the same as previous stress tests dating back as far as 2018.  Clinically she does deny any chest pains today she does have chronic NYHA class II dyspnea which she reports has improved since changing Lasix to Bumex as her urine output has increased as well.  She also had chest x-ray, BMP, and BNP performed all of which were unremarkable her renal function was stable.    Allergies   Allergen Reactions   • Codeine    • Demerol [Meperidine]    • Penicillins    •  Terramycin [Oxytetracycline]    :      Current Outpatient Medications:   •  acetaminophen (TYLENOL) 325 MG tablet, Take 325 mg by mouth Every 4 (Four) Hours As Needed for Fever., Disp: , Rfl:   •  allopurinol (ZYLOPRIM) 100 MG tablet, Take 100 mg by mouth daily., Disp: , Rfl:   •  Apoaequorin (PREVAGEN PO), Take 10 mg by mouth Daily., Disp: , Rfl:   •  aspirin 81 MG EC tablet, Take 81 mg by mouth daily., Disp: , Rfl:   •  atorvastatin (LIPITOR) 40 MG tablet, Take 40 mg by mouth daily., Disp: , Rfl:   •  bumetanide (BUMEX) 1 MG tablet, Take 1 tablet by mouth Daily. Take 2 tablets daily for 2 days and then 1 tablet daily after that., Disp: 30 tablet, Rfl: 2  •  carvedilol (COREG) 6.25 MG tablet, Take 1 tablet by mouth 2 (Two) Times a Day. (Patient taking differently: Take 12.5 mg by mouth 2 (Two) Times a Day.), Disp: 60 tablet, Rfl: 11  •  clonazePAM (KlonoPIN) 0.5 MG tablet, Take 0.5 mg by mouth 2 (two) times a day as needed for seizures., Disp: , Rfl:   •  dapagliflozin (Farxiga) 5 MG tablet tablet, Take 5 mg by mouth Daily., Disp: , Rfl:   •  dexlansoprazole (DEXILANT) 60 MG capsule, Take 60 mg by mouth daily., Disp: , Rfl:   •  diclofenac (VOLTAREN) 1 % gel gel, Apply 4 g topically 4 (Four) Times a Day As Needed., Disp: , Rfl:   •  DULoxetine (CYMBALTA) 60 MG capsule, Take 60 mg by mouth daily., Disp: , Rfl:   •  Entresto 24-26 MG tablet, TAKE ONE TABLET BY MOUTH TWICE A DAY, Disp: 60 tablet, Rfl: 5  •  gabapentin (NEURONTIN) 300 MG capsule, Take 300 mg by mouth 3 (Three) Times a Day., Disp: , Rfl:   •  Garlic 1000 MG capsule, Take 1,000 mg by mouth Daily., Disp: , Rfl:   •  HYDROcodone-acetaminophen (LORTAB) 5-325 MG per tablet, Take 1 tablet by mouth Every 6 (Six) Hours As Needed., Disp: , Rfl:   •  imipramine (TOFRANIL) 10 MG tablet, Take 10 mg by mouth 4 (Four) Times a Day., Disp: , Rfl:   •  isosorbide mononitrate (IMDUR) 60 MG 24 hr tablet, TAKE ONE TABLET BY MOUTH DAILY, Disp: 30 tablet, Rfl: 5  •   "Magnesium 100 MG capsule, Take  by mouth., Disp: , Rfl:   •  nitroglycerin (NITROSTAT) 0.4 MG SL tablet, 1 under the tongue as needed for angina, may repeat q5mins for up three doses, Disp: 25 tablet, Rfl: 3  •  Omega-3 Fatty Acids (fish oil) 1000 MG capsule capsule, Take  by mouth Daily With Breakfast., Disp: , Rfl:   •  tiZANidine (ZANAFLEX) 4 MG tablet, Take 4 mg by mouth at night as needed for muscle spasms., Disp: , Rfl:     The following portions of the patient's history were reviewed and updated as appropriate: allergies, current medications, past family history, past medical history, past social history, past surgical history and problem list.    Social History     Tobacco Use   • Smoking status: Former Smoker     Packs/day: 2.00     Types: Cigarettes     Quit date:      Years since quittin.6   • Smokeless tobacco: Never Used   Substance Use Topics   • Alcohol use: No   • Drug use: No         Objective   Vitals:    21 1513   BP: 108/67   Pulse: 76   Temp: 97.1 °F (36.2 °C)   Weight: 59.5 kg (131 lb 3.2 oz)   Height: 147.3 cm (57.99\")     Body mass index is 27.43 kg/m².    Constitutional:       General: Not in acute distress.     Appearance: Healthy appearance. Well-developed and not in distress. Not diaphoretic.   Eyes:      Conjunctiva/sclera: Conjunctivae normal.      Pupils: Pupils are equal, round, and reactive to light.   HENT:      Head: Normocephalic and atraumatic.   Neck:      Vascular: No carotid bruit or JVD.   Pulmonary:      Effort: Pulmonary effort is normal. No respiratory distress.      Breath sounds: Normal breath sounds.   Cardiovascular:      Normal rate. Regular rhythm.   Skin:     General: Skin is cool.   Neurological:      Mental Status: Alert, oriented to person, place, and time and oriented to person, place and time.         Lab Results   Component Value Date     07/15/2021    K 4.3 07/15/2021     07/15/2021    CO2 28.4 07/15/2021    BUN 26 (H) 07/15/2021    " CREATININE 1.14 (H) 07/15/2021    GLUCOSE 104 (H) 07/15/2021    CALCIUM 9.9 07/15/2021    AST 36 05/31/2019    ALT 29 05/31/2019    ALKPHOS 105 05/31/2019    LABIL2 2.8 (H) 05/31/2019     No results found for: CKTOTAL  Lab Results   Component Value Date    WBC 6.08 05/31/2016    HGB 13.1 05/31/2016    HCT 38.6 05/31/2016     05/31/2016     Lab Results   Component Value Date    INR 1.00 05/31/2016     No results found for: MG  No results found for: TSH, PSA, CHLPL, TRIG, HDL, LDL   No results found for: BNP    During this visit the following were done:  Labs Reviewed []    Labs Ordered []    Radiology Reports Reviewed []    Radiology Ordered []    PCP Records Reviewed []    Referring Provider Records Reviewed []    ER Records Reviewed []    Hospital Records Reviewed []    History Obtained From Family []    Radiology Images Reviewed []    Other Reviewed []    Records Requested []       Procedures    Assessment/Plan    Diagnosis Plan   1. Chronic systolic heart failure, appears compensated.     2.  ASCVD, S/P CABG 1997              Recommendations:  1. Chronic HFrEF  1. Clinically stable appears euvolemic on exam today.  She reports better urine output with Bumex I will continue this.  2. We will also continue aspirin, Lipitor, Farxiga, Entresto, Imdur.  3. Discussed results of stress test.  2. ICD implantation  1. Currently follows with electrophysiology.      No follow-ups on file.    As always, I appreciate very much the opportunity to participate in the cardiovascular care of your patients.      With Best Regards,    Arthur Gooden PA-C

## 2021-10-04 ENCOUNTER — IMMUNIZATION (OUTPATIENT)
Dept: VACCINE CLINIC | Facility: HOSPITAL | Age: 76
End: 2021-10-04

## 2021-10-04 PROCEDURE — 0004A ADM SARSCOV2 30MCG/0.3ML BOOSTER: CPT | Performed by: INTERNAL MEDICINE

## 2021-10-04 PROCEDURE — 91300 HC SARSCOV02 VAC 30MCG/0.3ML IM: CPT | Performed by: INTERNAL MEDICINE

## 2021-10-04 PROCEDURE — 0003A: CPT | Performed by: INTERNAL MEDICINE

## 2021-11-15 ENCOUNTER — HOSPITAL ENCOUNTER (OUTPATIENT)
Dept: MAMMOGRAPHY | Facility: HOSPITAL | Age: 76
Discharge: HOME | End: 2021-11-15
Admitting: FAMILY MEDICINE

## 2021-11-15 DIAGNOSIS — Z12.31 VISIT FOR SCREENING MAMMOGRAM: ICD-10-CM

## 2021-11-15 PROCEDURE — 77067 SCR MAMMO BI INCL CAD: CPT | Performed by: RADIOLOGY

## 2021-11-15 PROCEDURE — 77063 BREAST TOMOSYNTHESIS BI: CPT | Performed by: RADIOLOGY

## 2021-11-15 PROCEDURE — 77067 SCR MAMMO BI INCL CAD: CPT

## 2021-11-15 PROCEDURE — 77063 BREAST TOMOSYNTHESIS BI: CPT

## 2021-11-17 ENCOUNTER — OFFICE VISIT (OUTPATIENT)
Dept: CARDIOLOGY | Facility: CLINIC | Age: 76
End: 2021-11-17

## 2021-11-17 VITALS
TEMPERATURE: 97.3 F | RESPIRATION RATE: 16 BRPM | DIASTOLIC BLOOD PRESSURE: 60 MMHG | HEIGHT: 59 IN | HEART RATE: 81 BPM | SYSTOLIC BLOOD PRESSURE: 96 MMHG | WEIGHT: 131.8 LBS | BODY MASS INDEX: 26.57 KG/M2

## 2021-11-17 DIAGNOSIS — I50.22 CHRONIC SYSTOLIC HEART FAILURE (HCC): ICD-10-CM

## 2021-11-17 DIAGNOSIS — I25.5 ISCHEMIC CARDIOMYOPATHY: ICD-10-CM

## 2021-11-17 DIAGNOSIS — I25.10 ASCVD (ARTERIOSCLEROTIC CARDIOVASCULAR DISEASE): Primary | ICD-10-CM

## 2021-11-17 DIAGNOSIS — I50.22 CHRONIC SYSTOLIC CONGESTIVE HEART FAILURE (HCC): ICD-10-CM

## 2021-11-17 PROCEDURE — 93000 ELECTROCARDIOGRAM COMPLETE: CPT | Performed by: INTERNAL MEDICINE

## 2021-11-17 PROCEDURE — 99213 OFFICE O/P EST LOW 20 MIN: CPT | Performed by: INTERNAL MEDICINE

## 2021-11-17 RX ORDER — SACUBITRIL AND VALSARTAN 24; 26 MG/1; MG/1
1 TABLET, FILM COATED ORAL 2 TIMES DAILY
Qty: 180 TABLET | Refills: 2 | Status: SHIPPED | OUTPATIENT
Start: 2021-11-17 | End: 2022-08-29

## 2021-11-17 NOTE — PROGRESS NOTES
Kreis, Samuel Duane, MD  Kerri Kinney  2021    Patient Active Problem List   Diagnosis   •  ASCVD, S/P CABG    • CKD (chronic kidney disease) (stage III)   •  S/P ICD with recent gen change    • Preoperative cardiovascular examination   • History of chronic class II to III congestive heart failure   • Coronary artery disease   • Hypertension   • Depression   • Anxiety   • Hyperlipidemia   • GERD (gastroesophageal reflux disease)   • Dizziness   • Ischemic cardiomyopathy with LV ejection fraction of 30-35% in 2018 with NYHA class 2-3 dyspnea.   • Precordial pain   • Presence of biventricular implantable cardioverter-defibrillator (ICD)   • Chronic systolic heart failure, appears compensated.       Dear Kreis, Samuel Duane, MD:    Subjective     Kerri Kinney is a 76 y.o. female with the problems as listed above, presents    Chief complaint: Follow up of CAD and ischemic cardiomyopathy.    History of Present Illness: Ms. Kinney is a pleasant 76-year-old  female with history of known coronary artery disease with previous myocardial infarction, status post CABG  with underlying advanced ischemic cardiomyopathy, is here for regular cardiology follow-up.  On today's visit she complains of some sharp pains at the pacemaker site.  She has chronic dyspnea with mild exertion with no PND, orthopnea or pedal edema.  Her weight has been stable around 131 pounds.  She has mild dizziness but no syncope.    Kerri Kinney  Echo Complete w/Doppler and Color Flow  Order# [id number]  Reading physician:   Krish Ocasio MD Ordering physician:   Kreis, Samuel Duane, MD Study date: 19       Patient Name   Kerri Kinney MRN   [id number] Legal Sex   Female  (Age)   1945 (76 y.o.)     Interpretation Summary    · The left ventricular cavity is mildly dilated.  · The following left ventricular wall segments are hypokinetic: basal inferolateral, mid inferolateral, basal  inferoseptal, mid inferoseptal, mid anteroseptal and basal inferoseptal. The following left ventricular wall segments are dyskinetic: apical anterior, apical lateral, apical inferior, apical septal and apex.  · Estimated EF appears to be in the range of 31 - 35%.  · The aortic valve appears trileaflet. The aortic valve is abnormal in structure. There is mild calcification of the aortic valve.Moderate aortic valve regurgitation is present. Mild aortic valve stenosis is present.  · The mitral valve is abnormal in structure. Mild MAC is present. Mild mitral valve regurgitation is present. Mild mitral valve stenosis is present.  · The tricuspid valve is normal. No evidence of tricuspid valve stenosis is present. Mild to moderate tricuspid valve regurgitation is present. Estimated right ventricular systolic pressure from tricuspid regurgitation is normal (<35 mmHg).  · There is no evidence of pericardial effusion.  · Comments: No significant change since 2018. Her pulmonary hypertension appears to be better on this study.       Kerri Kinney  Regadenoson Stress Test With Myocardial Perfusion SPECT (Multi Study)  Order# [id number]  Reading physician: Krish Ocasio MD Ordering physician: Krish Ocasio MD Study date: 7/15/21       Patient Name   Kerri Kinney MRN   [id number] Legal Sex   Female  (Age)   1945 (76 y.o.)     Interpretation Summary    · A pharmacological stress test was performed using regadenoson without low-level exercise.  · Findings consistent with an indeterminate ECG stress test.  · Myocardial perfusion imaging indicates a large-sized infarct located in the anterior wall and apex with no significant ischemia noted.  · Abnormal LV wall motion consistent with large anteroapical dyskinesis.  · Left ventricular ejection fraction is severely reduced. (Calculated EF = 29%).  · Impressions are consistent with an intermediate risk study.         Allergies   Allergen Reactions   • Codeine     • Demerol [Meperidine]    • Penicillins    • Terramycin [Oxytetracycline]        Current Outpatient Medications:   •  acetaminophen (TYLENOL) 325 MG tablet, Take 325 mg by mouth Every 4 (Four) Hours As Needed for Fever., Disp: , Rfl:   •  allopurinol (ZYLOPRIM) 100 MG tablet, Take 100 mg by mouth daily., Disp: , Rfl:   •  Apoaequorin (PREVAGEN PO), Take 10 mg by mouth Daily., Disp: , Rfl:   •  aspirin 81 MG EC tablet, Take 81 mg by mouth daily., Disp: , Rfl:   •  atorvastatin (LIPITOR) 40 MG tablet, Take 40 mg by mouth daily., Disp: , Rfl:   •  bumetanide (BUMEX) 1 MG tablet, Take 1 tablet by mouth Daily. Take 2 tablets daily for 2 days and then 1 tablet daily after that., Disp: 30 tablet, Rfl: 2  •  carvedilol (COREG) 6.25 MG tablet, Take 1 tablet by mouth 2 (Two) Times a Day. (Patient taking differently: Take 12.5 mg by mouth 2 (Two) Times a Day.), Disp: 60 tablet, Rfl: 11  •  clonazePAM (KlonoPIN) 0.5 MG tablet, Take 0.5 mg by mouth 2 (two) times a day as needed for seizures., Disp: , Rfl:   •  dapagliflozin (Farxiga) 5 MG tablet tablet, Take 5 mg by mouth Daily., Disp: , Rfl:   •  dexlansoprazole (DEXILANT) 60 MG capsule, Take 60 mg by mouth daily., Disp: , Rfl:   •  diclofenac (VOLTAREN) 1 % gel gel, Apply 4 g topically 4 (Four) Times a Day As Needed., Disp: , Rfl:   •  DULoxetine (CYMBALTA) 60 MG capsule, Take 60 mg by mouth daily., Disp: , Rfl:   •  gabapentin (NEURONTIN) 300 MG capsule, Take 300 mg by mouth 3 (Three) Times a Day., Disp: , Rfl:   •  Garlic 1000 MG capsule, Take 1,000 mg by mouth Daily., Disp: , Rfl:   •  HYDROcodone-acetaminophen (LORTAB) 5-325 MG per tablet, Take 1 tablet by mouth Every 6 (Six) Hours As Needed., Disp: , Rfl:   •  imipramine (TOFRANIL) 10 MG tablet, Take 10 mg by mouth 4 (Four) Times a Day., Disp: , Rfl:   •  isosorbide mononitrate (IMDUR) 60 MG 24 hr tablet, TAKE ONE TABLET BY MOUTH DAILY, Disp: 30 tablet, Rfl: 5  •  nitroglycerin (NITROSTAT) 0.4 MG SL tablet, 1 under the  "tongue as needed for angina, may repeat q5mins for up three doses, Disp: 25 tablet, Rfl: 3  •  Omega-3 Fatty Acids (fish oil) 1000 MG capsule capsule, Take  by mouth Daily With Breakfast., Disp: , Rfl:   •  sacubitril-valsartan (Entresto) 24-26 MG tablet, Take 1 tablet by mouth 2 (Two) Times a Day., Disp: 180 tablet, Rfl: 2  •  tiZANidine (ZANAFLEX) 4 MG tablet, Take 4 mg by mouth at night as needed for muscle spasms., Disp: , Rfl:   •  Magnesium 100 MG capsule, Take  by mouth., Disp: , Rfl:     The following portions of the patient's history were reviewed and updated as appropriate: allergies, current medications, past family history, past medical history, past social history, past surgical history and problem list.    Social History     Tobacco Use   • Smoking status: Former Smoker     Packs/day: 2.00     Types: Cigarettes     Quit date:      Years since quittin.8   • Smokeless tobacco: Never Used   Substance Use Topics   • Alcohol use: No   • Drug use: No       Review of Systems   Constitutional: Negative for chills and fever.   HENT: Negative for nosebleeds and sore throat.    Cardiovascular: Positive for chest pain. Negative for leg swelling and palpitations.   Respiratory: Positive for shortness of breath. Negative for cough, hemoptysis and wheezing.    Gastrointestinal: Negative for abdominal pain, hematemesis, hematochezia, melena, nausea and vomiting.   Genitourinary: Negative for dysuria and hematuria.   Neurological: Negative for headaches.       Objective   Vitals:    21 1152   BP: 96/60   Pulse: 81   Resp: 16   Temp: 97.3 °F (36.3 °C)   Weight: 59.8 kg (131 lb 12.8 oz)   Height: 149.9 cm (59\")     Body mass index is 26.62 kg/m².    Constitutional:       Appearance: Well-developed.   Eyes:      Conjunctiva/sclera: Conjunctivae normal.   HENT:      Head: Normocephalic.   Neck:      Thyroid: No thyromegaly.      Vascular: No JVD.      Trachea: No tracheal deviation.   Pulmonary:      Effort: " No respiratory distress.      Breath sounds: Normal breath sounds. No wheezing. No rales.   Cardiovascular:      PMI at left midclavicular line. Normal rate. Regular rhythm. Normal S1. Normal S2.      Murmurs: There is no murmur.      No gallop. No click. No rub.   Pulses:     Intact distal pulses.   Edema:     Peripheral edema absent.   Abdominal:      General: Bowel sounds are normal.      Palpations: Abdomen is soft. There is no abdominal mass.      Tenderness: There is no abdominal tenderness.   Musculoskeletal:      Cervical back: Normal range of motion and neck supple. Skin:     General: Skin is warm and dry.   Neurological:      Mental Status: Alert and oriented to person, place, and time.      Cranial Nerves: No cranial nerve deficit.       Lab Results   Component Value Date     07/15/2021    K 4.3 07/15/2021     07/15/2021    CO2 28.4 07/15/2021    BUN 26 (H) 07/15/2021    CREATININE 1.14 (H) 07/15/2021    GLUCOSE 104 (H) 07/15/2021    CALCIUM 9.9 07/15/2021    AST 36 05/31/2019    ALT 29 05/31/2019    ALKPHOS 105 05/31/2019    LABIL2 2.8 (H) 05/31/2019     No results found for: CKTOTAL  Lab Results   Component Value Date    WBC 6.08 05/31/2016    HGB 13.1 05/31/2016    HCT 38.6 05/31/2016     05/31/2016     Lab Results   Component Value Date    INR 1.00 05/31/2016         ECG 12 Lead    Date/Time: 11/17/2021 11:53 AM  Performed by: Krish Ocasio MD  Authorized by: Krish Ocasio MD   Comparison: compared with previous ECG from 3/5/2021  Similar to previous ECG  Comments: Biventricular paced rhythm.                Assessment/Plan :   Diagnosis Plan   1.  ASCVD, status post previous myocardial infarction's, S/P CABG 1997, clinically stable.     2. Ischemic cardiomyopathy with LV ejection fraction of 30-35% in feb 2018 with NYHA class 2-3 dyspnea.     3. Chronic systolic heart failure, appears compensated.     4. Chronic systolic congestive heart failure (HCC)  sacubitril-valsartan  (Entresto) 24-26 MG tablet       Recommendations:  Continue with current medications except decrease the dose of isosorbide mononitrate to 30 mg daily due to baseline low blood pressure.    Return in about 5 months (around 4/17/2022).    As always, Kreis, Samuel Duane, MD  I appreciate very much the opportunity to participate in the cardiovascular care of your patients. Please do not hesitate to call me with any questions with regards to Kerri Kinney's evaluation and management.       With Best Regards,        Krish Ocasio MD, FACC    Please note that portions of this note were completed with a voice recognition program.

## 2022-01-31 RX ORDER — ISOSORBIDE MONONITRATE 60 MG/1
TABLET, EXTENDED RELEASE ORAL
Qty: 30 TABLET | Refills: 5 | Status: SHIPPED | OUTPATIENT
Start: 2022-01-31

## 2022-04-19 ENCOUNTER — OFFICE VISIT (OUTPATIENT)
Dept: CARDIOLOGY | Facility: CLINIC | Age: 77
End: 2022-04-19

## 2022-04-19 VITALS
TEMPERATURE: 97.3 F | DIASTOLIC BLOOD PRESSURE: 63 MMHG | SYSTOLIC BLOOD PRESSURE: 115 MMHG | BODY MASS INDEX: 25.72 KG/M2 | HEIGHT: 59 IN | HEART RATE: 79 BPM | WEIGHT: 127.6 LBS | OXYGEN SATURATION: 97 %

## 2022-04-19 DIAGNOSIS — Z95.810 PRESENCE OF BIVENTRICULAR IMPLANTABLE CARDIOVERTER-DEFIBRILLATOR (ICD): ICD-10-CM

## 2022-04-19 DIAGNOSIS — I25.10 ASCVD (ARTERIOSCLEROTIC CARDIOVASCULAR DISEASE): Primary | ICD-10-CM

## 2022-04-19 DIAGNOSIS — N18.31 STAGE 3A CHRONIC KIDNEY DISEASE: ICD-10-CM

## 2022-04-19 DIAGNOSIS — I50.22 CHRONIC SYSTOLIC HEART FAILURE: ICD-10-CM

## 2022-04-19 DIAGNOSIS — I25.5 ISCHEMIC CARDIOMYOPATHY: ICD-10-CM

## 2022-04-19 PROCEDURE — 99214 OFFICE O/P EST MOD 30 MIN: CPT | Performed by: INTERNAL MEDICINE

## 2022-04-19 NOTE — PROGRESS NOTES
Kreis, Samuel Duane, MD  Kerri Kinney  1945 04/19/2022    Patient Active Problem List   Diagnosis   •  ASCVD, S/P CABG 1997   • CKD (chronic kidney disease) (stage III)   •  S/P ICD with recent gen change 2009   • Preoperative cardiovascular examination   • History of chronic class II to III congestive heart failure   • Coronary artery disease   • Hypertension   • Depression   • Anxiety   • Hyperlipidemia   • GERD (gastroesophageal reflux disease)   • Dizziness   • Ischemic cardiomyopathy with LV ejection fraction of 30-35% in feb 2018 with NYHA class 2-3 dyspnea.   • Precordial pain   • Presence of biventricular implantable cardioverter-defibrillator (ICD)   • Chronic systolic heart failure, appears compensated.       Dear Kreis, Samuel Duane, MD:    Subjective     Kerri Kinney is a 76 y.o. female with the problems as listed above, presents    Chief complaint: Follow-up of coronary artery disease and ischemic cardiomyopathy.    History of Present Illness: Ms. Kinney is a pleasant 76-year-old  female with history of ASCVD, status post previous myocardial infarction, status post CABG 1997 with underlying ischemic cardiomyopathy with LV ejection fraction of about 31 to 35% on the last echo Doppler study in June 2019 with chronic systolic heart failure, presents today for regular cardiology follow-up.  On today's visit she denies any chest pains.  She has chronic dyspnea with mild exertion with no PND, orthopnea or significant pedal edema.  She has lost about 4 pounds since 11/17/2021.  She does admit to eating some pickles and some hamburgers and cheeseburgers at times.    Allergies   Allergen Reactions   • Codeine    • Demerol [Meperidine]    • Penicillins    • Terramycin [Oxytetracycline]    :      Current Outpatient Medications:   •  acetaminophen (TYLENOL) 325 MG tablet, Take 325 mg by mouth Every 4 (Four) Hours As Needed for Fever., Disp: , Rfl:   •  allopurinol (ZYLOPRIM) 100 MG tablet, Take  100 mg by mouth daily., Disp: , Rfl:   •  aspirin 81 MG EC tablet, Take 81 mg by mouth daily., Disp: , Rfl:   •  atorvastatin (LIPITOR) 40 MG tablet, Take 40 mg by mouth daily., Disp: , Rfl:   •  carvedilol (COREG) 6.25 MG tablet, Take 1 tablet by mouth 2 (Two) Times a Day. (Patient taking differently: Take 12.5 mg by mouth 2 (Two) Times a Day.), Disp: 60 tablet, Rfl: 11  •  clonazePAM (KlonoPIN) 0.5 MG tablet, Take 0.5 mg by mouth 2 (two) times a day as needed for seizures., Disp: , Rfl:   •  dapagliflozin (Farxiga) 5 MG tablet tablet, Take 5 mg by mouth Daily., Disp: , Rfl:   •  dexlansoprazole (DEXILANT) 60 MG capsule, Take 60 mg by mouth daily., Disp: , Rfl:   •  diclofenac (VOLTAREN) 1 % gel gel, Apply 4 g topically 4 (Four) Times a Day As Needed., Disp: , Rfl:   •  DULoxetine (CYMBALTA) 60 MG capsule, Take 60 mg by mouth daily., Disp: , Rfl:   •  gabapentin (NEURONTIN) 300 MG capsule, Take 300 mg by mouth 3 (Three) Times a Day., Disp: , Rfl:   •  Garlic 1000 MG capsule, Take 1,000 mg by mouth Daily., Disp: , Rfl:   •  HYDROcodone-acetaminophen (NORCO) 5-325 MG per tablet, Take 1 tablet by mouth Every 6 (Six) Hours As Needed., Disp: , Rfl:   •  imipramine (TOFRANIL) 10 MG tablet, Take 10 mg by mouth 4 (Four) Times a Day., Disp: , Rfl:   •  isosorbide mononitrate (IMDUR) 60 MG 24 hr tablet, TAKE ONE TABLET BY MOUTH DAILY, Disp: 30 tablet, Rfl: 5  •  nitroglycerin (NITROSTAT) 0.4 MG SL tablet, 1 under the tongue as needed for angina, may repeat q5mins for up three doses, Disp: 25 tablet, Rfl: 3  •  Omega-3 Fatty Acids (fish oil) 1000 MG capsule capsule, Take  by mouth Daily With Breakfast., Disp: , Rfl:   •  sacubitril-valsartan (Entresto) 24-26 MG tablet, Take 1 tablet by mouth 2 (Two) Times a Day., Disp: 180 tablet, Rfl: 2  •  tiZANidine (ZANAFLEX) 4 MG tablet, Take 4 mg by mouth at night as needed for muscle spasms., Disp: , Rfl:   •  Apoaequorin (PREVAGEN PO), Take 10 mg by mouth Daily., Disp: , Rfl:   •   "bumetanide (BUMEX) 1 MG tablet, Take 1 tablet by mouth Daily. Take 2 tablets daily for 2 days and then 1 tablet daily after that., Disp: 30 tablet, Rfl: 2  •  Magnesium 100 MG capsule, Take  by mouth., Disp: , Rfl:       The following portions of the patient's history were reviewed and updated as appropriate: allergies, current medications, past family history, past medical history, past social history, past surgical history and problem list.    Social History     Tobacco Use   • Smoking status: Former Smoker     Packs/day: 2.00     Types: Cigarettes     Quit date:      Years since quittin.3   • Smokeless tobacco: Never Used   Substance Use Topics   • Alcohol use: No   • Drug use: No       Review of Systems   Constitutional: Negative for chills and fever.   HENT: Negative for nosebleeds and sore throat.    Respiratory: Positive for shortness of breath. Negative for cough, hemoptysis and wheezing.    Gastrointestinal: Negative for abdominal pain, hematemesis, hematochezia, melena, nausea and vomiting.   Genitourinary: Negative for dysuria and hematuria.   Neurological: Negative for headaches.       Objective   Vitals:    22 1139   BP: 115/63   Pulse: 79   Temp: 97.3 °F (36.3 °C)   SpO2: 97%   Weight: 57.9 kg (127 lb 9.6 oz)   Height: 149.9 cm (59.02\")     Body mass index is 25.76 kg/m².    Vitals reviewed.   Constitutional:       Appearance: Well-developed.   Eyes:      Conjunctiva/sclera: Conjunctivae normal.   HENT:      Head: Normocephalic.   Neck:      Thyroid: No thyromegaly.      Vascular: No JVD.      Trachea: No tracheal deviation.   Pulmonary:      Effort: No respiratory distress.      Breath sounds: Normal breath sounds. No wheezing. No rales.   Cardiovascular:      PMI at left midclavicular line. Normal rate. Regular rhythm. Normal S1. Normal S2.      Murmurs: There is no murmur.      No gallop. No click. No rub.   Pulses:     Intact distal pulses.   Edema:     Peripheral edema absent. "   Abdominal:      General: Bowel sounds are normal.      Palpations: Abdomen is soft. There is no abdominal mass.      Tenderness: There is no abdominal tenderness.   Musculoskeletal:      Cervical back: Normal range of motion and neck supple. Skin:     General: Skin is warm and dry.   Neurological:      Mental Status: Alert and oriented to person, place, and time.      Cranial Nerves: No cranial nerve deficit.       Lab Results   Component Value Date     07/15/2021    K 4.3 07/15/2021     07/15/2021    CO2 28.4 07/15/2021    BUN 26 (H) 07/15/2021    CREATININE 1.14 (H) 07/15/2021    GLUCOSE 104 (H) 07/15/2021    CALCIUM 9.9 07/15/2021    AST 36 05/31/2019    ALT 29 05/31/2019    ALKPHOS 105 05/31/2019    LABIL2 2.8 (H) 05/31/2019     No results found for: CKTOTAL  Lab Results   Component Value Date    WBC 6.08 05/31/2016    HGB 13.1 05/31/2016    HCT 38.6 05/31/2016     05/31/2016     Lab Results   Component Value Date    INR 1.00 05/31/2016     Assessment/Plan :   Diagnosis Plan   1.  ASCVD, status post previous myocardial infarction's, S/P CABG 1997, clinically stable.     2. Ischemic cardiomyopathy with LV ejection fraction of 30-35% in feb 2018 with NYHA class 2-3 dyspnea.     3. Chronic systolic heart failure, appears compensated.     4. Stage 3a chronic kidney disease (HCC)     5. Presence of biventricular implantable cardioverter-defibrillator         Recommendations:  1. For her coronary artery disease, continue with aspirin, atorvastatin and carvedilol.  2. For her ischemic cardiomyopathy, continue with carvedilol and we will try to increase the dose of Entresto to 49/51 mg p.o. twice daily.  I will give her some samples to try and I told her to keep a check on her blood pressure at home and if the blood pressure tends to drop lower than 100 on the top with the increased dose of Entresto then go back to 24/26 mg p.o. twice daily.    3. Continue with Farxiga at 5 mg daily.  4. We will try  to find the cost of Verquoa and if this is affordable, we will initiate this for her cardiomyopathy.  5. I have instructed her about the salt restricted diet and the foods to avoid such as canned soups, canned vegetables, all chips, pickles and processed meats etc.  Patient expressed understanding.  6. Check BMP in a week.    Return in about 2 months (around 6/19/2022).    As always, Kreis, Samuel Duane, MD  I appreciate very much the opportunity to participate in the cardiovascular care of your patients. Please do not hesitate to call me with any questions with regards to Kerri Kinney evaluation and management.           With Best Regards,        Krish Ocasio MD, FACC    Please note that portions of this note were completed with a voice recognition program.

## 2022-04-20 ENCOUNTER — TELEPHONE (OUTPATIENT)
Dept: CARDIOLOGY | Facility: CLINIC | Age: 77
End: 2022-04-20

## 2022-04-20 NOTE — TELEPHONE ENCOUNTER
Dr Ocasio wanted me to get price of Verquvo. I called and spoke to Kaiden at [facility] pharmacy and he states that pt can get Verquvo 2.5mg daily #30 for $9.85 for this month only and then insurance will require a PA if pt continues to take this medication.

## 2022-04-21 RX ORDER — VERICIGUAT 10 MG/1
10 TABLET, FILM COATED ORAL EVERY MORNING
Qty: 30 TABLET | Refills: 2 | Status: SHIPPED | OUTPATIENT
Start: 2022-04-21 | End: 2022-09-16 | Stop reason: SDUPTHER

## 2022-04-21 NOTE — TELEPHONE ENCOUNTER
I called pt and let her know the instructions of the meds and she understood and will  samples tomorrow.

## 2022-04-21 NOTE — TELEPHONE ENCOUNTER
Can we let her know that I want her to come in to  samples. I want her to start 2.5 mg daily after two weeeks I want her to go to 5 mg, and two weeks after this to go to 10 mg.

## 2022-04-25 ENCOUNTER — LAB (OUTPATIENT)
Dept: LAB | Facility: HOSPITAL | Age: 77
End: 2022-04-25

## 2022-04-25 DIAGNOSIS — I50.22 CHRONIC SYSTOLIC HEART FAILURE: ICD-10-CM

## 2022-04-25 PROCEDURE — 36415 COLL VENOUS BLD VENIPUNCTURE: CPT

## 2022-04-25 PROCEDURE — 80048 BASIC METABOLIC PNL TOTAL CA: CPT

## 2022-04-26 DIAGNOSIS — I25.10 ASCVD (ARTERIOSCLEROTIC CARDIOVASCULAR DISEASE): Primary | ICD-10-CM

## 2022-04-26 LAB
ANION GAP SERPL CALCULATED.3IONS-SCNC: 14.5 MMOL/L (ref 5–15)
BUN SERPL-MCNC: 37 MG/DL (ref 8–23)
BUN/CREAT SERPL: 24 (ref 7–25)
CALCIUM SPEC-SCNC: 9.6 MG/DL (ref 8.6–10.5)
CHLORIDE SERPL-SCNC: 103 MMOL/L (ref 98–107)
CO2 SERPL-SCNC: 24.5 MMOL/L (ref 22–29)
CREAT SERPL-MCNC: 1.54 MG/DL (ref 0.57–1)
EGFRCR SERPLBLD CKD-EPI 2021: 35.1 ML/MIN/1.73
GLUCOSE SERPL-MCNC: 92 MG/DL (ref 65–99)
POTASSIUM SERPL-SCNC: 4.8 MMOL/L (ref 3.5–5.2)
SODIUM SERPL-SCNC: 142 MMOL/L (ref 136–145)

## 2022-04-30 ENCOUNTER — HOSPITAL ENCOUNTER (OUTPATIENT)
Dept: HOSPITAL 79 - LAB | Age: 77
End: 2022-04-30
Attending: INTERNAL MEDICINE
Payer: MEDICARE

## 2022-04-30 DIAGNOSIS — N18.32: Primary | ICD-10-CM

## 2022-04-30 LAB — BUN/CREATININE RATIO: 34 (ref 0–10)

## 2022-05-03 LAB
CREATININE, URINE: 118.2 MG/DL
MICROALB/CREAT RATIO: 14 (ref 0–29)

## 2022-07-12 ENCOUNTER — OFFICE VISIT (OUTPATIENT)
Dept: CARDIOLOGY | Facility: CLINIC | Age: 77
End: 2022-07-12

## 2022-07-12 VITALS
OXYGEN SATURATION: 96 % | HEART RATE: 74 BPM | BODY MASS INDEX: 24.6 KG/M2 | WEIGHT: 122 LBS | DIASTOLIC BLOOD PRESSURE: 70 MMHG | SYSTOLIC BLOOD PRESSURE: 119 MMHG | HEIGHT: 59 IN

## 2022-07-12 DIAGNOSIS — I25.5 ISCHEMIC CARDIOMYOPATHY: ICD-10-CM

## 2022-07-12 DIAGNOSIS — N18.31 STAGE 3A CHRONIC KIDNEY DISEASE: ICD-10-CM

## 2022-07-12 DIAGNOSIS — I50.22 CHRONIC SYSTOLIC HEART FAILURE: ICD-10-CM

## 2022-07-12 DIAGNOSIS — I25.10 ASCVD (ARTERIOSCLEROTIC CARDIOVASCULAR DISEASE): Primary | ICD-10-CM

## 2022-07-12 DIAGNOSIS — Z95.810 PRESENCE OF BIVENTRICULAR IMPLANTABLE CARDIOVERTER-DEFIBRILLATOR (ICD): ICD-10-CM

## 2022-07-12 PROCEDURE — 93000 ELECTROCARDIOGRAM COMPLETE: CPT | Performed by: INTERNAL MEDICINE

## 2022-07-12 PROCEDURE — 99213 OFFICE O/P EST LOW 20 MIN: CPT | Performed by: INTERNAL MEDICINE

## 2022-07-12 RX ORDER — CARVEDILOL 12.5 MG/1
12.5 TABLET ORAL 2 TIMES DAILY WITH MEALS
COMMUNITY

## 2022-07-12 NOTE — PROGRESS NOTES
Kreis, Samuel Duane, MD  Kerri Kinney  1945 07/12/2022    Patient Active Problem List   Diagnosis   •  ASCVD, S/P CABG 1997   • CKD (chronic kidney disease) (stage III)   •  S/P ICD with recent gen change 2009   • Preoperative cardiovascular examination   • History of chronic class II to III congestive heart failure   • Coronary artery disease   • Hypertension   • Depression   • Anxiety   • Hyperlipidemia   • GERD (gastroesophageal reflux disease)   • Dizziness   • Ischemic cardiomyopathy with LV ejection fraction of 30-35% in feb 2018 with NYHA class 2-3 dyspnea.   • Precordial pain   • Presence of biventricular implantable cardioverter-defibrillator (ICD)   • Chronic systolic heart failure, appears compensated.       Dear Kreis, Samuel Duane, MD:    Subjective     Kerri Kinney is a 77 y.o. female with the problems as listed above, presents    Chief complaint: Follow-up of coronary artery disease and ischemic cardiomyopathy.    History of Present Illness: Ms. Kinney is a very pleasant 77-year-old  female with history of known coronary artery disease with previous myocardial infarction's, status post CABG 1997 with underlying ischemic cardiomyopathy with LV ejection fraction of about 30 to 35%.  She has chronic systolic heart failure.  She is here for regular cardiology follow-up.  On today's visit she denies any complains of chest pains.  She states her breathing has been somewhat better with medication adjustment.  She is currently on Entresto 24/26 mg p.o. twice daily, carvedilol 12.5 mg p.o. twice daily, Farxiga 5 mg daily and or Verquvo 10 mg daily.  She seems to be tolerating medications well.  She states that she is following salt restricted diet.  She has a lost about 5 pounds since April 2022.    Allergies   Allergen Reactions   • Codeine    • Demerol [Meperidine]    • Penicillins    • Terramycin [Oxytetracycline]        Current Outpatient Medications:   •  acetaminophen (TYLENOL) 325 MG  tablet, Take 325 mg by mouth Every 4 (Four) Hours As Needed for Fever., Disp: , Rfl:   •  allopurinol (ZYLOPRIM) 100 MG tablet, Take 100 mg by mouth daily., Disp: , Rfl:   •  Apoaequorin (PREVAGEN PO), Take 10 mg by mouth Daily., Disp: , Rfl:   •  aspirin 81 MG EC tablet, Take 81 mg by mouth daily., Disp: , Rfl:   •  atorvastatin (LIPITOR) 40 MG tablet, Take 40 mg by mouth daily., Disp: , Rfl:   •  bumetanide (BUMEX) 1 MG tablet, Take 1 tablet by mouth Daily. Take 2 tablets daily for 2 days and then 1 tablet daily after that., Disp: 30 tablet, Rfl: 2  •  carvedilol (COREG) 12.5 MG tablet, Take 12.5 mg by mouth 2 (Two) Times a Day With Meals., Disp: , Rfl:   •  clonazePAM (KlonoPIN) 0.5 MG tablet, Take 0.5 mg by mouth 2 (two) times a day as needed for seizures., Disp: , Rfl:   •  dapagliflozin (Farxiga) 5 MG tablet tablet, Take 5 mg by mouth Daily., Disp: , Rfl:   •  dexlansoprazole (DEXILANT) 60 MG capsule, Take 60 mg by mouth daily., Disp: , Rfl:   •  diclofenac (VOLTAREN) 1 % gel gel, Apply 4 g topically 4 (Four) Times a Day As Needed., Disp: , Rfl:   •  gabapentin (NEURONTIN) 300 MG capsule, Take 300 mg by mouth 3 (Three) Times a Day., Disp: , Rfl:   •  Garlic 1000 MG capsule, Take 1,000 mg by mouth Daily., Disp: , Rfl:   •  HYDROcodone-acetaminophen (NORCO) 5-325 MG per tablet, Take 1 tablet by mouth Every 6 (Six) Hours As Needed., Disp: , Rfl:   •  imipramine (TOFRANIL) 10 MG tablet, Take 10 mg by mouth 4 (Four) Times a Day., Disp: , Rfl:   •  isosorbide mononitrate (IMDUR) 60 MG 24 hr tablet, TAKE ONE TABLET BY MOUTH DAILY, Disp: 30 tablet, Rfl: 5  •  Magnesium 100 MG capsule, Take  by mouth., Disp: , Rfl:   •  nitroglycerin (NITROSTAT) 0.4 MG SL tablet, 1 under the tongue as needed for angina, may repeat q5mins for up three doses, Disp: 25 tablet, Rfl: 3  •  Omega-3 Fatty Acids (fish oil) 1000 MG capsule capsule, Take  by mouth Daily With Breakfast., Disp: , Rfl:   •  sacubitril-valsartan (Entresto) 24-26 MG  "tablet, Take 1 tablet by mouth 2 (Two) Times a Day., Disp: 180 tablet, Rfl: 2  •  tiZANidine (ZANAFLEX) 4 MG tablet, Take 4 mg by mouth at night as needed for muscle spasms., Disp: , Rfl:   •  Vericiguat (Verquvo) 10 MG tablet, Take 10 mg by mouth Every Morning., Disp: 30 tablet, Rfl: 2      The following portions of the patient's history were reviewed and updated as appropriate: allergies, current medications, past family history, past medical history, past social history, past surgical history and problem list.    Social History     Tobacco Use   • Smoking status: Former Smoker     Packs/day: 2.00     Types: Cigarettes     Quit date:      Years since quittin.5   • Smokeless tobacco: Never Used   Substance Use Topics   • Alcohol use: No   • Drug use: No       Review of Systems   Cardiovascular: Positive for chest pain and leg swelling. Negative for palpitations and syncope.   Respiratory: Positive for shortness of breath.    Neurological: Positive for dizziness.     Objective   Vitals:    22 1152   BP: 119/70   BP Location: Right arm   Patient Position: Sitting   Cuff Size: Adult   Pulse: 74   SpO2: 96%   Weight: 55.3 kg (122 lb)   Height: 149.9 cm (59\")     Body mass index is 24.64 kg/m².    Vitals reviewed.   Constitutional:       Appearance: Well-developed.   Eyes:      Conjunctiva/sclera: Conjunctivae normal.   HENT:      Head: Normocephalic.   Neck:      Thyroid: No thyromegaly.      Vascular: No JVD.      Trachea: No tracheal deviation.   Pulmonary:      Effort: No respiratory distress.      Breath sounds: Normal breath sounds. No wheezing. No rales.   Cardiovascular:      PMI at left midclavicular line. Normal rate. Regular rhythm. Normal S1. Normal S2.      Murmurs: There is no murmur.      No gallop. No click. No rub.   Pulses:     Intact distal pulses.   Edema:     Peripheral edema absent.   Abdominal:      General: Bowel sounds are normal.      Palpations: Abdomen is soft. There is no " abdominal mass.      Tenderness: There is no abdominal tenderness.   Musculoskeletal:      Cervical back: Normal range of motion and neck supple. Skin:     General: Skin is warm and dry.   Neurological:      Mental Status: Alert and oriented to person, place, and time.      Cranial Nerves: No cranial nerve deficit.         Lab Results   Component Value Date     04/25/2022    K 4.8 04/25/2022     04/25/2022    CO2 24.5 04/25/2022    BUN 37 (H) 04/25/2022    CREATININE 1.54 (H) 04/25/2022    GLUCOSE 92 04/25/2022    CALCIUM 9.6 04/25/2022    AST 36 05/31/2019    ALT 29 05/31/2019    ALKPHOS 105 05/31/2019    LABIL2 2.8 (H) 05/31/2019     No results found for: CKTOTAL  Lab Results   Component Value Date    WBC 6.08 05/31/2016    HGB 13.1 05/31/2016    HCT 38.6 05/31/2016     05/31/2016     Lab Results   Component Value Date    INR 1.00 05/31/2016        ECG 12 Lead    Date/Time: 7/12/2022 11:46 AM  Performed by: Krish Ocasio MD  Authorized by: Krish Ocasio MD   Comparison: compared with previous ECG from 11/17/2021  Similar to previous ECG  Comments: Biventricular paced rhythm.            Assessment & Plan    Diagnosis Plan   1. ASCVD (arteriosclerotic cardiovascular disease), s/p previous MI, status post CABG in 1997.  Clinically stable.     2. Ischemic cardiomyopathy with LV ejection fraction of 30-35% in feb 2018 with NYHA class 2-3 dyspnea.     3. Chronic systolic heart failure, appears compensated.     4. Stage 3a chronic kidney disease (HCC)     5. Presence of biventricular implantable cardioverter-defibrillator (ICD)          Recommendations:  Continue with carvedilol, Entresto, Farxiga andVerquvo at current doses.      Return in about 3 months (around 10/12/2022).    As always, Kreis, Samuel Duane, MD  I appreciate very much the opportunity to participate in the cardiovascular care of your patients. Please do not hesitate to call me with any questions with regards to Kerri Kinney  evaluation and management.           With Best Regards,        Krish Ocasio MD, [facility]    Please note that portions of this note were completed with a voice recognition program.

## 2022-07-14 ENCOUNTER — HOSPITAL ENCOUNTER (OUTPATIENT)
Dept: HOSPITAL 79 - KOH-I | Age: 77
End: 2022-07-14
Attending: FAMILY MEDICINE
Payer: MEDICARE

## 2022-07-14 DIAGNOSIS — M47.26: Primary | ICD-10-CM

## 2022-08-11 ENCOUNTER — HOSPITAL ENCOUNTER (OUTPATIENT)
Dept: HOSPITAL 79 - KOH-I | Age: 77
End: 2022-08-11
Attending: FAMILY MEDICINE
Payer: MEDICARE

## 2022-08-11 DIAGNOSIS — M25.511: Primary | ICD-10-CM

## 2022-08-23 ENCOUNTER — HOSPITAL ENCOUNTER (EMERGENCY)
Dept: HOSPITAL 79 - ER1 | Age: 77
Discharge: HOME | End: 2022-08-23
Payer: MEDICARE

## 2022-08-23 DIAGNOSIS — S46.222A: Primary | ICD-10-CM

## 2022-08-23 DIAGNOSIS — I51.9: ICD-10-CM
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
## 2022-08-23 DIAGNOSIS — I25.2: ICD-10-CM

## 2022-08-23 DIAGNOSIS — Z95.1: ICD-10-CM

## 2022-08-23 DIAGNOSIS — Z23: ICD-10-CM

## 2022-08-29 DIAGNOSIS — I50.22 CHRONIC SYSTOLIC CONGESTIVE HEART FAILURE: ICD-10-CM

## 2022-08-29 RX ORDER — SACUBITRIL AND VALSARTAN 24; 26 MG/1; MG/1
TABLET, FILM COATED ORAL
Qty: 180 TABLET | Refills: 2 | Status: SHIPPED | OUTPATIENT
Start: 2022-08-29

## 2022-09-16 RX ORDER — VERICIGUAT 10 MG/1
10 TABLET, FILM COATED ORAL EVERY MORNING
Qty: 30 TABLET | Refills: 2 | Status: SHIPPED | OUTPATIENT
Start: 2022-09-16

## 2022-09-16 NOTE — TELEPHONE ENCOUNTER
Pt called and stated she is out of samples for Verquvo and wanted a script sent into her pharmacy.

## 2022-11-03 ENCOUNTER — TRANSCRIBE ORDERS (OUTPATIENT)
Dept: LAB | Facility: HOSPITAL | Age: 77
End: 2022-11-03

## 2022-11-03 ENCOUNTER — LAB (OUTPATIENT)
Dept: LAB | Facility: HOSPITAL | Age: 77
End: 2022-11-03

## 2022-11-03 DIAGNOSIS — N18.32 CHRONIC KIDNEY DISEASE (CKD) STAGE G3B/A2, MODERATELY DECREASED GLOMERULAR FILTRATION RATE (GFR) BETWEEN 30-44 ML/MIN/1.73 SQUARE METER AND ALBUMINURIA CREATININE RATIO BETWEEN 30-299 MG/G (CMS*: Primary | ICD-10-CM

## 2022-11-03 DIAGNOSIS — N18.32 CHRONIC KIDNEY DISEASE (CKD) STAGE G3B/A2, MODERATELY DECREASED GLOMERULAR FILTRATION RATE (GFR) BETWEEN 30-44 ML/MIN/1.73 SQUARE METER AND ALBUMINURIA CREATININE RATIO BETWEEN 30-299 MG/G (CMS*: ICD-10-CM

## 2022-11-03 PROCEDURE — 36415 COLL VENOUS BLD VENIPUNCTURE: CPT

## 2022-11-03 PROCEDURE — 82043 UR ALBUMIN QUANTITATIVE: CPT

## 2022-11-03 PROCEDURE — 80048 BASIC METABOLIC PNL TOTAL CA: CPT

## 2022-11-03 PROCEDURE — 82570 ASSAY OF URINE CREATININE: CPT

## 2022-11-04 LAB
ALBUMIN UR-MCNC: 1.8 MG/DL
ANION GAP SERPL CALCULATED.3IONS-SCNC: 10.5 MMOL/L (ref 5–15)
BUN SERPL-MCNC: 28 MG/DL (ref 8–23)
BUN/CREAT SERPL: 28 (ref 7–25)
CALCIUM SPEC-SCNC: 9.8 MG/DL (ref 8.6–10.5)
CHLORIDE SERPL-SCNC: 104 MMOL/L (ref 98–107)
CO2 SERPL-SCNC: 28.5 MMOL/L (ref 22–29)
CREAT SERPL-MCNC: 1 MG/DL (ref 0.57–1)
CREAT UR-MCNC: 92.4 MG/DL
EGFRCR SERPLBLD CKD-EPI 2021: 58.5 ML/MIN/1.73
GLUCOSE SERPL-MCNC: 93 MG/DL (ref 65–99)
MICROALBUMIN/CREAT UR: 19.5 MG/G
POTASSIUM SERPL-SCNC: 4.7 MMOL/L (ref 3.5–5.2)
SODIUM SERPL-SCNC: 143 MMOL/L (ref 136–145)

## 2022-11-15 ENCOUNTER — OFFICE VISIT (OUTPATIENT)
Dept: CARDIOLOGY | Facility: CLINIC | Age: 77
End: 2022-11-15

## 2022-11-15 VITALS
SYSTOLIC BLOOD PRESSURE: 104 MMHG | WEIGHT: 120.6 LBS | BODY MASS INDEX: 24.31 KG/M2 | HEART RATE: 91 BPM | HEIGHT: 59 IN | DIASTOLIC BLOOD PRESSURE: 65 MMHG | OXYGEN SATURATION: 98 %

## 2022-11-15 DIAGNOSIS — I25.10 ASCVD (ARTERIOSCLEROTIC CARDIOVASCULAR DISEASE): Primary | ICD-10-CM

## 2022-11-15 DIAGNOSIS — I50.22 CHRONIC SYSTOLIC CONGESTIVE HEART FAILURE: ICD-10-CM

## 2022-11-15 DIAGNOSIS — I25.5 ISCHEMIC CARDIOMYOPATHY: ICD-10-CM

## 2022-11-15 DIAGNOSIS — N18.31 STAGE 3A CHRONIC KIDNEY DISEASE: ICD-10-CM

## 2022-11-15 DIAGNOSIS — Z95.810 PRESENCE OF BIVENTRICULAR IMPLANTABLE CARDIOVERTER-DEFIBRILLATOR (ICD): ICD-10-CM

## 2022-11-15 PROCEDURE — 99214 OFFICE O/P EST MOD 30 MIN: CPT | Performed by: INTERNAL MEDICINE

## 2022-11-15 RX ORDER — SPIRONOLACTONE 25 MG/1
12.5 TABLET ORAL DAILY
Qty: 30 TABLET | Refills: 3 | Status: SHIPPED | OUTPATIENT
Start: 2022-11-15

## 2022-11-15 NOTE — PROGRESS NOTES
Kreis, Samuel Duane, MD  Kerri Kinney  1945  11/15/2022    Patient Active Problem List   Diagnosis   •  ASCVD, S/P CABG 1997   • CKD (chronic kidney disease) (stage III)   •  S/P ICD with recent gen change 2009   • Preoperative cardiovascular examination   • History of chronic class II to III congestive heart failure   • Coronary artery disease   • Hypertension   • Depression   • Anxiety   • Hyperlipidemia   • GERD (gastroesophageal reflux disease)   • Dizziness   • Ischemic cardiomyopathy with LV ejection fraction of 30-35% in feb 2018 with NYHA class 2-3 dyspnea.   • Precordial pain   • Presence of biventricular implantable cardioverter-defibrillator (ICD)   • Chronic systolic heart failure, appears compensated.       Dear Kreis, Samuel Duane, MD:    Subjective     Kerri Kinney is a 77 y.o. female with the problems as listed above, presents    Chief Complaint: Follow up of CAD and ischemic cardiomyopathy.       History of Present Illness: Ms. Kinney is a pleasant 77-year-old  female with history of known coronary artery disease, status post previous myocardial infarction's, s/p CABG 1997 with underlying advanced ischemic cardiomyopathy with LV ejection fraction of about 30 to 35%.  She is here for regular cardiology follow-up.  On today's visit she states her breathing is little bit worse than before.  She denies any PND, orthopnea pedal edema.  She actually has lost about couple of pounds since 7/12/2022.  Her weight today was 120 pounds.  She denies any chest pain or discomfort.  She states that Verquo is not covered under her insurance.  She says she is trying to follow the salt restricted diet.      Allergies   Allergen Reactions   • Codeine    • Demerol [Meperidine]    • Penicillins    • Terramycin [Oxytetracycline]    :    Current Outpatient Medications:   •  acetaminophen (TYLENOL) 325 MG tablet, Take 325 mg by mouth Every 4 (Four) Hours As Needed for Fever., Disp: , Rfl:   •  allopurinol  (ZYLOPRIM) 100 MG tablet, Take 100 mg by mouth daily., Disp: , Rfl:   •  Apoaequorin (PREVAGEN PO), Take 10 mg by mouth Daily., Disp: , Rfl:   •  aspirin 81 MG EC tablet, Take 81 mg by mouth daily., Disp: , Rfl:   •  atorvastatin (LIPITOR) 40 MG tablet, Take 40 mg by mouth daily., Disp: , Rfl:   •  bumetanide (BUMEX) 1 MG tablet, Take 1 tablet by mouth Daily. Take 2 tablets daily for 2 days and then 1 tablet daily after that., Disp: 30 tablet, Rfl: 2  •  carvedilol (COREG) 12.5 MG tablet, Take 12.5 mg by mouth 2 (Two) Times a Day With Meals., Disp: , Rfl:   •  clonazePAM (KlonoPIN) 0.5 MG tablet, Take 0.5 mg by mouth 2 (two) times a day as needed for seizures., Disp: , Rfl:   •  dapagliflozin (Farxiga) 5 MG tablet tablet, Take 5 mg by mouth Daily., Disp: , Rfl:   •  dexlansoprazole (DEXILANT) 60 MG capsule, Take 60 mg by mouth daily., Disp: , Rfl:   •  diclofenac (VOLTAREN) 1 % gel gel, Apply 4 g topically 4 (Four) Times a Day As Needed., Disp: , Rfl:   •  Entresto 24-26 MG tablet, TAKE ONE TABLET BY MOUTH TWICE A DAY, Disp: 180 tablet, Rfl: 2  •  gabapentin (NEURONTIN) 300 MG capsule, Take 300 mg by mouth 3 (Three) Times a Day., Disp: , Rfl:   •  Garlic 1000 MG capsule, Take 1,000 mg by mouth Daily., Disp: , Rfl:   •  HYDROcodone-acetaminophen (NORCO) 5-325 MG per tablet, Take 1 tablet by mouth Every 6 (Six) Hours As Needed., Disp: , Rfl:   •  imipramine (TOFRANIL) 10 MG tablet, Take 10 mg by mouth 4 (Four) Times a Day., Disp: , Rfl:   •  isosorbide mononitrate (IMDUR) 60 MG 24 hr tablet, TAKE ONE TABLET BY MOUTH DAILY, Disp: 30 tablet, Rfl: 5  •  Magnesium 100 MG capsule, Take  by mouth., Disp: , Rfl:   •  nitroglycerin (NITROSTAT) 0.4 MG SL tablet, 1 under the tongue as needed for angina, may repeat q5mins for up three doses, Disp: 25 tablet, Rfl: 3  •  Omega-3 Fatty Acids (fish oil) 1000 MG capsule capsule, Take  by mouth Daily With Breakfast., Disp: , Rfl:   •  tiZANidine (ZANAFLEX) 4 MG tablet, Take 4 mg by  "mouth at night as needed for muscle spasms., Disp: , Rfl:   •  spironolactone (ALDACTONE) 25 MG tablet, Take 0.5 tablets by mouth Daily., Disp: 30 tablet, Rfl: 3  •  Vericiguat (Verquvo) 10 MG tablet, Take 1 tablet by mouth Every Morning., Disp: 30 tablet, Rfl: 2      The following portions of the patient's history were reviewed and updated as appropriate: allergies, current medications, past family history, past medical history, past social history, past surgical history and problem list.    Social History     Tobacco Use   • Smoking status: Former     Packs/day: 2.00     Types: Cigarettes     Quit date:      Years since quittin.8   • Smokeless tobacco: Never   Vaping Use   • Vaping Use: Never used   Substance Use Topics   • Alcohol use: No   • Drug use: No     Review of Systems   Constitutional: Negative for chills and fever.   HENT: Negative for nosebleeds and sore throat.    Respiratory: Positive for shortness of breath. Negative for cough, hemoptysis and wheezing.    Gastrointestinal: Negative for abdominal pain, hematemesis, hematochezia, melena, nausea and vomiting.   Genitourinary: Negative for dysuria and hematuria.   Neurological: Negative for headaches.     Objective   Vitals:    11/15/22 1133   BP: 104/65   BP Location: Left arm   Patient Position: Sitting   Cuff Size: Adult   Pulse: 91   SpO2: 98%   Weight: 54.7 kg (120 lb 9.6 oz)   Height: 149.9 cm (59\")     Body mass index is 24.36 kg/m².        Constitutional:       Appearance: Well-developed.   Eyes:      Conjunctiva/sclera: Conjunctivae normal.   HENT:      Head: Normocephalic.   Neck:      Thyroid: No thyromegaly.      Vascular: No JVD.      Trachea: No tracheal deviation.   Pulmonary:      Effort: No respiratory distress.      Breath sounds: Normal breath sounds. No wheezing. No rales.   Cardiovascular:      PMI at left midclavicular line. Normal rate. Regular rhythm. Normal S1. Normal S2.      Murmurs: There is no murmur.      No " gallop. No click. No rub.   Pulses:     Intact distal pulses.   Edema:     Peripheral edema absent.   Abdominal:      General: Bowel sounds are normal.      Palpations: Abdomen is soft. There is no abdominal mass.      Tenderness: There is no abdominal tenderness.   Musculoskeletal:      Cervical back: Normal range of motion and neck supple. Skin:     General: Skin is warm and dry.   Neurological:      Mental Status: Alert and oriented to person, place, and time.      Cranial Nerves: No cranial nerve deficit.       Lab Results   Component Value Date     11/03/2022    K 4.7 11/03/2022     11/03/2022    CO2 28.5 11/03/2022    BUN 28 (H) 11/03/2022    CREATININE 1.00 11/03/2022    GLUCOSE 93 11/03/2022    CALCIUM 9.8 11/03/2022    AST 36 05/31/2019    ALT 29 05/31/2019    ALKPHOS 105 05/31/2019    LABIL2 2.8 (H) 05/31/2019     No results found for: CKTOTAL  Lab Results   Component Value Date    WBC 6.08 05/31/2016    HGB 13.1 05/31/2016    HCT 38.6 05/31/2016     05/31/2016     Lab Results   Component Value Date    INR 1.00 05/31/2016     Assessment & Plan :   Diagnosis Plan   1. ASCVD (arteriosclerotic cardiovascular disease), s/p previous MI, status post CABG in 1997.  Clinically stable.     2. Chronic systolic congestive heart failure, appears compensated, NYHA class III.     3. Ischemic cardiomyopathy with LV ejection fraction of 30-35% in feb 2018 with NYHA class 2-3 dyspnea.     4. Presence of biventricular implantable cardioverter-defibrillator (ICD)     5. Stage 3a chronic kidney disease (HCC)          Recommendations:  1. Continue with Entresto, carvedilol, Farxiga at current doses.  2. We will see if we can get her into a drug assistance program for Verquo.  3. Will add low-dose spironolactone 12.5 mg daily due to relatively low blood pressure at baseline and monitor the potassium levels.  4. Check BMP in 1 week in 2 weeks.    Return in about 4 months (around 3/15/2023).    As always,  Kreis, Samuel Duane, MD  I appreciate very much the opportunity to participate in the cardiovascular care of your patients. Please do not hesitate to call me with any questions with regards to Kerri Kinney's evaluation and management.       With Best Regards,        Krish Ocasio MD, [facility]    Please note that portions of this note were completed with a voice recognition program.

## 2022-11-22 ENCOUNTER — HOSPITAL ENCOUNTER (OUTPATIENT)
Dept: BONE DENSITY | Facility: HOSPITAL | Age: 77
Discharge: HOME | End: 2022-11-22

## 2022-11-22 ENCOUNTER — HOSPITAL ENCOUNTER (OUTPATIENT)
Dept: MAMMOGRAPHY | Facility: HOSPITAL | Age: 77
Discharge: HOME | End: 2022-11-22

## 2022-11-22 DIAGNOSIS — M81.0 AGE-RELATED OSTEOPOROSIS WITHOUT CURRENT PATHOLOGICAL FRACTURE: ICD-10-CM

## 2022-11-22 DIAGNOSIS — Z12.31 VISIT FOR SCREENING MAMMOGRAM: ICD-10-CM

## 2022-11-22 PROCEDURE — 77063 BREAST TOMOSYNTHESIS BI: CPT

## 2022-11-22 PROCEDURE — 77067 SCR MAMMO BI INCL CAD: CPT | Performed by: RADIOLOGY

## 2022-11-22 PROCEDURE — 77080 DXA BONE DENSITY AXIAL: CPT

## 2022-11-22 PROCEDURE — 77063 BREAST TOMOSYNTHESIS BI: CPT | Performed by: RADIOLOGY

## 2022-11-22 PROCEDURE — 77067 SCR MAMMO BI INCL CAD: CPT

## 2022-11-22 PROCEDURE — 77080 DXA BONE DENSITY AXIAL: CPT | Performed by: RADIOLOGY

## 2023-02-15 ENCOUNTER — TELEPHONE (OUTPATIENT)
Dept: CARDIOLOGY | Facility: CLINIC | Age: 78
End: 2023-02-15
Payer: MEDICARE

## 2023-02-15 NOTE — TELEPHONE ENCOUNTER
Called PCP and LM with medical records to fax over the lab results.     Called pt and she stated that she has not had her labs done. She stated she will go in the morning to have her labs done at the [facility].

## 2023-02-16 ENCOUNTER — LAB (OUTPATIENT)
Dept: LAB | Facility: HOSPITAL | Age: 78
End: 2023-02-16
Payer: MEDICARE

## 2023-02-16 DIAGNOSIS — I50.22 CHRONIC SYSTOLIC CONGESTIVE HEART FAILURE: ICD-10-CM

## 2023-02-16 PROCEDURE — 36415 COLL VENOUS BLD VENIPUNCTURE: CPT

## 2023-02-16 PROCEDURE — 80048 BASIC METABOLIC PNL TOTAL CA: CPT

## 2023-02-17 LAB
ANION GAP SERPL CALCULATED.3IONS-SCNC: 9.1 MMOL/L (ref 5–15)
BUN SERPL-MCNC: 29 MG/DL (ref 8–23)
BUN/CREAT SERPL: 29 (ref 7–25)
CALCIUM SPEC-SCNC: 9.8 MG/DL (ref 8.6–10.5)
CHLORIDE SERPL-SCNC: 105 MMOL/L (ref 98–107)
CO2 SERPL-SCNC: 29.9 MMOL/L (ref 22–29)
CREAT SERPL-MCNC: 1 MG/DL (ref 0.57–1)
EGFRCR SERPLBLD CKD-EPI 2021: 58.5 ML/MIN/1.73
GLUCOSE SERPL-MCNC: 97 MG/DL (ref 65–99)
POTASSIUM SERPL-SCNC: 4.3 MMOL/L (ref 3.5–5.2)
SODIUM SERPL-SCNC: 144 MMOL/L (ref 136–145)

## 2023-05-04 ENCOUNTER — LAB (OUTPATIENT)
Dept: LAB | Facility: HOSPITAL | Age: 78
End: 2023-05-04
Payer: MEDICARE

## 2023-05-04 ENCOUNTER — TRANSCRIBE ORDERS (OUTPATIENT)
Dept: LAB | Facility: HOSPITAL | Age: 78
End: 2023-05-04
Payer: MEDICARE

## 2023-05-04 DIAGNOSIS — N18.32 CHRONIC KIDNEY DISEASE (CKD) STAGE G3B/A1, MODERATELY DECREASED GLOMERULAR FILTRATION RATE (GFR) BETWEEN 30-44 ML/MIN/1.73 SQUARE METER AND ALBUMINURIA CREATININE RATIO LESS THAN 30 MG/G (CMS/H*: Primary | ICD-10-CM

## 2023-05-04 DIAGNOSIS — N18.32 CHRONIC KIDNEY DISEASE (CKD) STAGE G3B/A1, MODERATELY DECREASED GLOMERULAR FILTRATION RATE (GFR) BETWEEN 30-44 ML/MIN/1.73 SQUARE METER AND ALBUMINURIA CREATININE RATIO LESS THAN 30 MG/G (CMS/H*: ICD-10-CM

## 2023-05-04 PROCEDURE — 36415 COLL VENOUS BLD VENIPUNCTURE: CPT

## 2023-05-04 PROCEDURE — 80053 COMPREHEN METABOLIC PANEL: CPT

## 2023-05-04 PROCEDURE — 82043 UR ALBUMIN QUANTITATIVE: CPT

## 2023-05-04 PROCEDURE — 82570 ASSAY OF URINE CREATININE: CPT

## 2023-05-05 LAB
ALBUMIN SERPL-MCNC: 4.5 G/DL (ref 3.5–5.2)
ALBUMIN UR-MCNC: <1.2 MG/DL
ALBUMIN/GLOB SERPL: 2 G/DL
ALP SERPL-CCNC: 100 U/L (ref 39–117)
ALT SERPL W P-5'-P-CCNC: 32 U/L (ref 1–33)
ANION GAP SERPL CALCULATED.3IONS-SCNC: 10 MMOL/L (ref 5–15)
AST SERPL-CCNC: 24 U/L (ref 1–32)
BILIRUB SERPL-MCNC: 0.4 MG/DL (ref 0–1.2)
BUN SERPL-MCNC: 51 MG/DL (ref 8–23)
BUN/CREAT SERPL: 41.1 (ref 7–25)
CALCIUM SPEC-SCNC: 9.5 MG/DL (ref 8.6–10.5)
CHLORIDE SERPL-SCNC: 102 MMOL/L (ref 98–107)
CO2 SERPL-SCNC: 27 MMOL/L (ref 22–29)
CREAT SERPL-MCNC: 1.24 MG/DL (ref 0.57–1)
CREAT UR-MCNC: 75.6 MG/DL
EGFRCR SERPLBLD CKD-EPI 2021: 45.2 ML/MIN/1.73
GLOBULIN UR ELPH-MCNC: 2.3 GM/DL
GLUCOSE SERPL-MCNC: 82 MG/DL (ref 65–99)
MICROALBUMIN/CREAT UR: NORMAL MG/G{CREAT}
POTASSIUM SERPL-SCNC: 5 MMOL/L (ref 3.5–5.2)
PROT SERPL-MCNC: 6.8 G/DL (ref 6–8.5)
SODIUM SERPL-SCNC: 139 MMOL/L (ref 136–145)

## 2023-05-26 DIAGNOSIS — I50.22 CHRONIC SYSTOLIC CONGESTIVE HEART FAILURE: ICD-10-CM

## 2023-05-30 RX ORDER — SACUBITRIL AND VALSARTAN 24; 26 MG/1; MG/1
1 TABLET, FILM COATED ORAL 2 TIMES DAILY
Qty: 180 TABLET | Refills: 0 | Status: SHIPPED | OUTPATIENT
Start: 2023-05-30

## 2023-07-20 ENCOUNTER — OFFICE VISIT (OUTPATIENT)
Dept: CARDIOLOGY | Facility: CLINIC | Age: 78
End: 2023-07-20
Payer: MEDICARE

## 2023-07-20 VITALS
HEART RATE: 97 BPM | HEIGHT: 59 IN | OXYGEN SATURATION: 99 % | BODY MASS INDEX: 23.59 KG/M2 | SYSTOLIC BLOOD PRESSURE: 156 MMHG | WEIGHT: 117 LBS | DIASTOLIC BLOOD PRESSURE: 77 MMHG

## 2023-07-20 DIAGNOSIS — I25.5 ISCHEMIC CARDIOMYOPATHY: ICD-10-CM

## 2023-07-20 DIAGNOSIS — I50.22 CHRONIC SYSTOLIC CONGESTIVE HEART FAILURE: ICD-10-CM

## 2023-07-20 DIAGNOSIS — Z95.810 PRESENCE OF BIVENTRICULAR IMPLANTABLE CARDIOVERTER-DEFIBRILLATOR (ICD): ICD-10-CM

## 2023-07-20 DIAGNOSIS — I25.10 ASCVD (ARTERIOSCLEROTIC CARDIOVASCULAR DISEASE): Primary | ICD-10-CM

## 2023-07-20 PROCEDURE — 99214 OFFICE O/P EST MOD 30 MIN: CPT | Performed by: INTERNAL MEDICINE

## 2023-07-20 PROCEDURE — 3077F SYST BP >= 140 MM HG: CPT | Performed by: INTERNAL MEDICINE

## 2023-07-20 PROCEDURE — 93000 ELECTROCARDIOGRAM COMPLETE: CPT | Performed by: INTERNAL MEDICINE

## 2023-07-20 PROCEDURE — 3078F DIAST BP <80 MM HG: CPT | Performed by: INTERNAL MEDICINE

## 2023-07-20 NOTE — LETTER
July 25, 2023     Samuel Duane Kreis, MD  [address] [place] [address] [place] [address]    Patient: Kerri Kinney   YOB: 1945   Date of Visit: 7/20/2023     Dear Samuel Duane Kreis, MD:       Thank you for referring Kerri Kinney to me for evaluation. Below are the relevant portions of my assessment and plan of care.    If you have questions, please do not hesitate to call me. I look forward to following Kerri along with you.         Sincerely,        Krish Ocasio MD        CC: No Recipients    Krish Ocasio MD  07/25/23 1849  Sign when Signing Visit  Kreis, Samuel Duane, MD  Kerri Kinney  1945 07/20/2023    Patient Active Problem List   Diagnosis   •  ASCVD, S/P CABG 1997   • CKD (chronic kidney disease) (stage III)   •  S/P ICD with recent gen change 2009   • Preoperative cardiovascular examination   • History of chronic class II to III congestive heart failure   • Coronary artery disease   • Hypertension   • Depression   • Anxiety   • Hyperlipidemia   • GERD (gastroesophageal reflux disease)   • Dizziness   • Ischemic cardiomyopathy with LV ejection fraction of 30-35% in feb 2018 with NYHA class 2-3 dyspnea.   • Precordial pain   • Presence of biventricular implantable cardioverter-defibrillator (ICD)   • Chronic systolic heart failure, appears compensated.       Dear Kreis, Samuel Duane, MD:    Subjective    Kerri Kinney is a 78 y.o. female with the problems as listed above, presents    Chief complaint: Follow-up of coronary artery disease and ischemic cardiomyopathy.    History of Present Illness: Ms. Kinney is a very pleasant 78-year-old  female with history of known CAD, status post previous myocardial infarction's, s/p CABG 1997 and underlying advanced ischemic cardiomyopathy with LV ejection fraction about 30 to 35%.  She is here today as a regular cardiology follow-up.  On today's visit she complains of only occasional chest pains.  She has chronic dyspnea with mild to  moderate exertion with no PND, 1 pillow orthopnea but no pedal edema.    Kerri Kinney  Regadenoson Stress Test with Myocardial Perfusion SPECT (Multi Study)  Order# [id number]  Reading physician: Krish Ocasio MD Ordering physician: Krish Ocasio MD Study date: 7/15/21     Patient Information    Patient Name  Kerri Kinney MRN  [id number] Legal Sex  Female  (Age)  1945 (78 y.o.)     Interpretation Summary    A pharmacological stress test was performed using regadenoson without low-level exercise.  Findings consistent with an indeterminate ECG stress test.  Myocardial perfusion imaging indicates a large-sized infarct located in the anterior wall and apex with no significant ischemia noted.  Abnormal LV wall motion consistent with large anteroapical dyskinesis.  Left ventricular ejection fraction is severely reduced. (Calculated EF = 29%).  Impressions are consistent with an intermediate risk study.      Allergies   Allergen Reactions   • Codeine    • Demerol [Meperidine]    • Penicillins    • Terramycin [Oxytetracycline]    :    Current Outpatient Medications:   •  allopurinol (ZYLOPRIM) 100 MG tablet, Take 1 tablet by mouth Daily., Disp: , Rfl:   •  atorvastatin (LIPITOR) 40 MG tablet, Take 1 tablet by mouth Daily., Disp: , Rfl:   •  bumetanide (BUMEX) 1 MG tablet, Take 1 tablet by mouth Daily. Take 2 tablets daily for 2 days and then 1 tablet daily after that., Disp: 30 tablet, Rfl: 2  •  carvedilol (COREG) 12.5 MG tablet, Take 1 tablet by mouth 2 (Two) Times a Day With Meals., Disp: , Rfl:   •  clonazePAM (KlonoPIN) 0.5 MG tablet, Take 1 tablet by mouth 2 (Two) Times a Day As Needed for Seizures., Disp: , Rfl:   •  dapagliflozin (Farxiga) 5 MG tablet tablet, Take 1 tablet by mouth Daily., Disp: , Rfl:   •  dexlansoprazole (DEXILANT) 60 MG capsule, Take 1 capsule by mouth Daily., Disp: , Rfl:   •  diclofenac (VOLTAREN) 1 % gel gel, Apply 4 g topically 4 (Four) Times a Day As Needed., Disp: , Rfl:    •  gabapentin (NEURONTIN) 300 MG capsule, Take 1 capsule by mouth 3 (Three) Times a Day., Disp: , Rfl:   •  Garlic 1000 MG capsule, Take 1 capsule by mouth Daily., Disp: , Rfl:   •  HYDROcodone-acetaminophen (NORCO) 5-325 MG per tablet, Take 1 tablet by mouth Every 6 (Six) Hours As Needed., Disp: , Rfl:   •  imipramine (TOFRANIL) 10 MG tablet, Take 1 tablet by mouth 4 (Four) Times a Day., Disp: , Rfl:   •  isosorbide mononitrate (IMDUR) 60 MG 24 hr tablet, TAKE ONE TABLET BY MOUTH DAILY, Disp: 30 tablet, Rfl: 5  •  Magnesium 100 MG capsule, Take  by mouth., Disp: , Rfl:   •  nitroglycerin (NITROSTAT) 0.4 MG SL tablet, 1 under the tongue as needed for angina, may repeat q5mins for up three doses, Disp: 25 tablet, Rfl: 3  •  Omega-3 Fatty Acids (fish oil) 1000 MG capsule capsule, Take  by mouth Daily With Breakfast., Disp: , Rfl:   •  sacubitril-valsartan (Entresto) 24-26 MG tablet, Take 1 tablet by mouth 2 (Two) Times a Day., Disp: 180 tablet, Rfl: 0  •  spironolactone (ALDACTONE) 25 MG tablet, TAKE 1/2 TABLET BY MOUTH DAILY, Disp: 30 tablet, Rfl: 3  •  tiZANidine (ZANAFLEX) 4 MG tablet, Take 1 tablet by mouth At Night As Needed for Muscle Spasms., Disp: , Rfl:   •  Vericiguat (Verquvo) 10 MG tablet, Take 1 tablet by mouth Every Morning., Disp: 30 tablet, Rfl: 2  •  acetaminophen (TYLENOL) 325 MG tablet, Take 325 mg by mouth Every 4 (Four) Hours As Needed for Fever., Disp: , Rfl:   •  Apoaequorin (PREVAGEN PO), Take 10 mg by mouth Daily., Disp: , Rfl:   •  aspirin 81 MG EC tablet, Take 81 mg by mouth daily., Disp: , Rfl:     The following portions of the patient's history were reviewed and updated as appropriate: allergies, current medications, past family history, past medical history, past social history, past surgical history and problem list.    Social History     Tobacco Use   • Smoking status: Former     Packs/day: 2.00     Types: Cigarettes     Quit date:      Years since quittin.5   • Smokeless  "tobacco: Never   Vaping Use   • Vaping Use: Never used   Substance Use Topics   • Alcohol use: No   • Drug use: No     Review of Systems   Constitutional: Negative for chills and fever.   HENT:  Negative for nosebleeds and sore throat.    Cardiovascular:  Positive for chest pain.   Respiratory:  Positive for shortness of breath. Negative for cough, hemoptysis and wheezing.    Gastrointestinal:  Negative for abdominal pain, hematemesis, hematochezia, melena, nausea and vomiting.   Genitourinary:  Negative for dysuria and hematuria.   Neurological:  Negative for headaches.   All other systems reviewed and are negative.  Objective  Vitals:    07/20/23 1332   BP: 156/77   Pulse: 97   SpO2: 99%   Weight: 53.1 kg (117 lb)   Height: 149.9 cm (59\")     Body mass index is 23.63 kg/m².    Vitals reviewed.   Constitutional:       Appearance: Well-developed.   Eyes:      Conjunctiva/sclera: Conjunctivae normal.   HENT:      Head: Normocephalic.   Neck:      Thyroid: No thyromegaly.      Vascular: No JVD.      Trachea: No tracheal deviation.   Pulmonary:      Effort: No respiratory distress.      Breath sounds: Normal breath sounds. No wheezing. No rales.   Cardiovascular:      PMI at left midclavicular line. Normal rate. Regular rhythm. Normal S1. Normal S2.       Murmurs: There is no murmur.      No gallop.  No click. No rub.   Pulses:     Intact distal pulses.   Edema:     Peripheral edema absent.   Abdominal:      General: Bowel sounds are normal.      Palpations: Abdomen is soft. There is no abdominal mass.      Tenderness: There is no abdominal tenderness.   Musculoskeletal:      Cervical back: Normal range of motion and neck supple. Skin:     General: Skin is warm and dry.   Neurological:      Mental Status: Alert and oriented to person, place, and time.      Cranial Nerves: No cranial nerve deficit.                 Component  Ref Range & Units 2 mo ago 5 mo ago 8 mo ago 1 yr ago 2 yr ago 4 yr ago 6 yr ago   Glucose  65 - " 99 mg/dL 82 97 93 92 104 High  80 88 R   BUN  8 - 23 mg/dL 51 High  29 High  28 High  37 High  26 High  40 High  R 29 High  R   Creatinine  0.57 - 1.00 mg/dL 1.24 High  1.00 1.00 1.54 High  1.14 High  1.15 High  0.82 R   Sodium  136 - 145 mmol/L 139 144 143 142 139 141 R 141 R   Potassium  3.5 - 5.2 mmol/L 5.0 4.3 4.7 4.8 4.3 4.6 4.4 R   Chloride  98 - 107 mmol/L 102 105 104 103 100 98 R 105 R   CO2  22.0 - 29.0 mmol/L 27.0 29.9 High  28.5 24.5 28.4 27 R 30.6 R   Calcium  8.6 - 10.5 mg/dL 9.5 9.8 9.8 9.6 9.9 9.9 R 9.8 R   Total Protein  6.0 - 8.5 g/dL 6.8     6.4    Albumin  3.5 - 5.2 g/dL 4.5     4.7 R    ALT (SGPT)  1 - 33 U/L 32     29 R    AST (SGOT)  1 - 32 U/L 24     36 R    Alkaline Phosphatase  39 - 117 U/L 100     105 R    Total Bilirubin  0.0 - 1.2 mg/dL 0.4     0.6    Globulin  gm/dL 2.3         A/G Ratio  g/dL 2.0     2.8 High  R    BUN/Creatinine Ratio  7.0 - 25.0 41.1 High  29.0 High  28.0 High  24.0 22.8 35 High  R 35.4 High    Anion Gap  5.0 - 15.0 mmol/L 10.0 9.1 10.5 14.5 10.6  5.4 R   eGFR  >60.0 mL/min/1.73 45.2 Low  58.5 Low  58.5 Low  CM 35.1 Low  CM             Lab Results   Component Value Date     05/04/2023    K 5.0 05/04/2023     05/04/2023    CO2 27.0 05/04/2023    BUN 51 (H) 05/04/2023    CREATININE 1.24 (H) 05/04/2023    GLUCOSE 82 05/04/2023    CALCIUM 9.5 05/04/2023    AST 24 05/04/2023    ALT 32 05/04/2023    ALKPHOS 100 05/04/2023    LABIL2 2.8 (H) 05/31/2019     No results found for: CKTOTAL  Lab Results   Component Value Date    WBC 6.08 05/31/2016    HGB 13.1 05/31/2016    HCT 38.6 05/31/2016     05/31/2016     Lab Results   Component Value Date    INR 1.00 05/31/2016       ECG 12 Lead    Date/Time: 7/20/2023 1:37 PM  Performed by: Krish Ocasio MD  Authorized by: Krish Ocasio MD   Comparison: compared with previous ECG from 7/12/2022  Similar to previous ECG  Rhythm: paced  Comments: Atrial sensed, ventricular paced rhythm        Assessment & Plan     Diagnosis Plan   1. ASCVD (arteriosclerotic cardiovascular disease), s/p previous MI, s/p CABG in 1997, clinically stable.        2. Ischemic cardiomyopathy with an ejection fraction of about 30 to 35% with NYHA class III dyspnea.        3. Presence of biventricular implantable cardioverter-defibrillator (ICD) GEN change in 2009        4. Chronic systolic congestive heart failure, appears compensated, NYHA class III.  Basic Metabolic Panel        Recommendations  Since her kidney function is down with increased BUN/creatinine ratio indicating of intravascular volume depletion, will change the bumetanide to 1 mg every other day.  Have discussed this with Ms. Kinney and she expressed understanding.  I have asked her to drink more water.  Continue with other medications.  BMP in a week.    Return in about 3 months (around 10/20/2023) for or sooner if needed.    As always, Kreis, Samuel Duane, MD  I appreciate very much the opportunity to participate in the cardiovascular care of your patients. Please do not hesitate to call me with any questions with regards to Kerri Kinney's evaluation and management.       With Best Regards,        Krish Ocasio MD, [facility]    Please note that portions of this note were completed with a voice recognition program.

## 2023-07-20 NOTE — PROGRESS NOTES
Kreis, Samuel Duane, MD  Kerri Kinney  2023    Patient Active Problem List   Diagnosis     ASCVD, S/P CABG     CKD (chronic kidney disease) (stage III)     S/P ICD with recent gen change     Preoperative cardiovascular examination    History of chronic class II to III congestive heart failure    Coronary artery disease    Hypertension    Depression    Anxiety    Hyperlipidemia    GERD (gastroesophageal reflux disease)    Dizziness    Ischemic cardiomyopathy with LV ejection fraction of 30-35% in 2018 with NYHA class 2-3 dyspnea.    Precordial pain    Presence of biventricular implantable cardioverter-defibrillator (ICD)    Chronic systolic heart failure, appears compensated.       Dear Kreis, Samuel Duane, MD:    Subjective     Kerri Kinney is a 78 y.o. female with the problems as listed above, presents    Chief complaint: Follow-up of coronary artery disease and ischemic cardiomyopathy.    History of Present Illness: Ms. Kinney is a very pleasant 78-year-old  female with history of known CAD, status post previous myocardial infarction's, s/p CABG  and underlying advanced ischemic cardiomyopathy with LV ejection fraction about 30 to 35%.  She is here today as a regular cardiology follow-up.  On today's visit she complains of only occasional chest pains.  She has chronic dyspnea with mild to moderate exertion with no PND, 1 pillow orthopnea but no pedal edema.    Kerri Kinney  Regadenoson Stress Test with Myocardial Perfusion SPECT (Multi Study)  Order# [id number]  Reading physician: Krish Ocasio MD Ordering physician: Krish Ocasio MD Study date: 7/15/21     Patient Information    Patient Name  Kerri Kinney MRN  [id number] Legal Sex  Female  (Age)  1945 (78 y.o.)     Interpretation Summary    A pharmacological stress test was performed using regadenoson without low-level exercise.  Findings consistent with an indeterminate ECG stress  test.  Myocardial perfusion imaging indicates a large-sized infarct located in the anterior wall and apex with no significant ischemia noted.  Abnormal LV wall motion consistent with large anteroapical dyskinesis.  Left ventricular ejection fraction is severely reduced. (Calculated EF = 29%).  Impressions are consistent with an intermediate risk study.      Allergies   Allergen Reactions    Codeine     Demerol [Meperidine]     Penicillins     Terramycin [Oxytetracycline]    :    Current Outpatient Medications:     allopurinol (ZYLOPRIM) 100 MG tablet, Take 1 tablet by mouth Daily., Disp: , Rfl:     atorvastatin (LIPITOR) 40 MG tablet, Take 1 tablet by mouth Daily., Disp: , Rfl:     bumetanide (BUMEX) 1 MG tablet, Take 1 tablet by mouth Daily. Take 2 tablets daily for 2 days and then 1 tablet daily after that., Disp: 30 tablet, Rfl: 2    carvedilol (COREG) 12.5 MG tablet, Take 1 tablet by mouth 2 (Two) Times a Day With Meals., Disp: , Rfl:     clonazePAM (KlonoPIN) 0.5 MG tablet, Take 1 tablet by mouth 2 (Two) Times a Day As Needed for Seizures., Disp: , Rfl:     dapagliflozin (Farxiga) 5 MG tablet tablet, Take 1 tablet by mouth Daily., Disp: , Rfl:     dexlansoprazole (DEXILANT) 60 MG capsule, Take 1 capsule by mouth Daily., Disp: , Rfl:     diclofenac (VOLTAREN) 1 % gel gel, Apply 4 g topically 4 (Four) Times a Day As Needed., Disp: , Rfl:     gabapentin (NEURONTIN) 300 MG capsule, Take 1 capsule by mouth 3 (Three) Times a Day., Disp: , Rfl:     Garlic 1000 MG capsule, Take 1 capsule by mouth Daily., Disp: , Rfl:     HYDROcodone-acetaminophen (NORCO) 5-325 MG per tablet, Take 1 tablet by mouth Every 6 (Six) Hours As Needed., Disp: , Rfl:     imipramine (TOFRANIL) 10 MG tablet, Take 1 tablet by mouth 4 (Four) Times a Day., Disp: , Rfl:     isosorbide mononitrate (IMDUR) 60 MG 24 hr tablet, TAKE ONE TABLET BY MOUTH DAILY, Disp: 30 tablet, Rfl: 5    Magnesium 100 MG capsule, Take  by mouth., Disp: , Rfl:      nitroglycerin (NITROSTAT) 0.4 MG SL tablet, 1 under the tongue as needed for angina, may repeat q5mins for up three doses, Disp: 25 tablet, Rfl: 3    Omega-3 Fatty Acids (fish oil) 1000 MG capsule capsule, Take  by mouth Daily With Breakfast., Disp: , Rfl:     sacubitril-valsartan (Entresto) 24-26 MG tablet, Take 1 tablet by mouth 2 (Two) Times a Day., Disp: 180 tablet, Rfl: 0    spironolactone (ALDACTONE) 25 MG tablet, TAKE 1/2 TABLET BY MOUTH DAILY, Disp: 30 tablet, Rfl: 3    tiZANidine (ZANAFLEX) 4 MG tablet, Take 1 tablet by mouth At Night As Needed for Muscle Spasms., Disp: , Rfl:     Vericiguat (Verquvo) 10 MG tablet, Take 1 tablet by mouth Every Morning., Disp: 30 tablet, Rfl: 2    acetaminophen (TYLENOL) 325 MG tablet, Take 325 mg by mouth Every 4 (Four) Hours As Needed for Fever., Disp: , Rfl:     Apoaequorin (PREVAGEN PO), Take 10 mg by mouth Daily., Disp: , Rfl:     aspirin 81 MG EC tablet, Take 81 mg by mouth daily., Disp: , Rfl:     The following portions of the patient's history were reviewed and updated as appropriate: allergies, current medications, past family history, past medical history, past social history, past surgical history and problem list.    Social History     Tobacco Use    Smoking status: Former     Packs/day: 2.00     Types: Cigarettes     Quit date:      Years since quittin.5    Smokeless tobacco: Never   Vaping Use    Vaping Use: Never used   Substance Use Topics    Alcohol use: No    Drug use: No     Review of Systems   Constitutional: Negative for chills and fever.   HENT:  Negative for nosebleeds and sore throat.    Cardiovascular:  Positive for chest pain.   Respiratory:  Positive for shortness of breath. Negative for cough, hemoptysis and wheezing.    Gastrointestinal:  Negative for abdominal pain, hematemesis, hematochezia, melena, nausea and vomiting.   Genitourinary:  Negative for dysuria and hematuria.   Neurological:  Negative for headaches.   All other systems  "reviewed and are negative.  Objective   Vitals:    07/20/23 1332   BP: 156/77   Pulse: 97   SpO2: 99%   Weight: 53.1 kg (117 lb)   Height: 149.9 cm (59\")     Body mass index is 23.63 kg/m².    Vitals reviewed.   Constitutional:       Appearance: Well-developed.   Eyes:      Conjunctiva/sclera: Conjunctivae normal.   HENT:      Head: Normocephalic.   Neck:      Thyroid: No thyromegaly.      Vascular: No JVD.      Trachea: No tracheal deviation.   Pulmonary:      Effort: No respiratory distress.      Breath sounds: Normal breath sounds. No wheezing. No rales.   Cardiovascular:      PMI at left midclavicular line. Normal rate. Regular rhythm. Normal S1. Normal S2.       Murmurs: There is no murmur.      No gallop.  No click. No rub.   Pulses:     Intact distal pulses.   Edema:     Peripheral edema absent.   Abdominal:      General: Bowel sounds are normal.      Palpations: Abdomen is soft. There is no abdominal mass.      Tenderness: There is no abdominal tenderness.   Musculoskeletal:      Cervical back: Normal range of motion and neck supple. Skin:     General: Skin is warm and dry.   Neurological:      Mental Status: Alert and oriented to person, place, and time.      Cranial Nerves: No cranial nerve deficit.                 Component  Ref Range & Units 2 mo ago 5 mo ago 8 mo ago 1 yr ago 2 yr ago 4 yr ago 6 yr ago   Glucose  65 - 99 mg/dL 82 97 93 92 104 High  80 88 R   BUN  8 - 23 mg/dL 51 High  29 High  28 High  37 High  26 High  40 High  R 29 High  R   Creatinine  0.57 - 1.00 mg/dL 1.24 High  1.00 1.00 1.54 High  1.14 High  1.15 High  0.82 R   Sodium  136 - 145 mmol/L 139 144 143 142 139 141 R 141 R   Potassium  3.5 - 5.2 mmol/L 5.0 4.3 4.7 4.8 4.3 4.6 4.4 R   Chloride  98 - 107 mmol/L 102 105 104 103 100 98 R 105 R   CO2  22.0 - 29.0 mmol/L 27.0 29.9 High  28.5 24.5 28.4 27 R 30.6 R   Calcium  8.6 - 10.5 mg/dL 9.5 9.8 9.8 9.6 9.9 9.9 R 9.8 R   Total Protein  6.0 - 8.5 g/dL 6.8     6.4    Albumin  3.5 - 5.2 g/dL " 4.5     4.7 R    ALT (SGPT)  1 - 33 U/L 32     29 R    AST (SGOT)  1 - 32 U/L 24     36 R    Alkaline Phosphatase  39 - 117 U/L 100     105 R    Total Bilirubin  0.0 - 1.2 mg/dL 0.4     0.6    Globulin  gm/dL 2.3         A/G Ratio  g/dL 2.0     2.8 High  R    BUN/Creatinine Ratio  7.0 - 25.0 41.1 High  29.0 High  28.0 High  24.0 22.8 35 High  R 35.4 High    Anion Gap  5.0 - 15.0 mmol/L 10.0 9.1 10.5 14.5 10.6  5.4 R   eGFR  >60.0 mL/min/1.73 45.2 Low  58.5 Low  58.5 Low  CM 35.1 Low  CM             Lab Results   Component Value Date     05/04/2023    K 5.0 05/04/2023     05/04/2023    CO2 27.0 05/04/2023    BUN 51 (H) 05/04/2023    CREATININE 1.24 (H) 05/04/2023    GLUCOSE 82 05/04/2023    CALCIUM 9.5 05/04/2023    AST 24 05/04/2023    ALT 32 05/04/2023    ALKPHOS 100 05/04/2023    LABIL2 2.8 (H) 05/31/2019     No results found for: CKTOTAL  Lab Results   Component Value Date    WBC 6.08 05/31/2016    HGB 13.1 05/31/2016    HCT 38.6 05/31/2016     05/31/2016     Lab Results   Component Value Date    INR 1.00 05/31/2016       ECG 12 Lead    Date/Time: 7/20/2023 1:37 PM  Performed by: Krish Ocasio MD  Authorized by: Krish Ocasio MD   Comparison: compared with previous ECG from 7/12/2022  Similar to previous ECG  Rhythm: paced  Comments: Atrial sensed, ventricular paced rhythm        Assessment & Plan    Diagnosis Plan   1. ASCVD (arteriosclerotic cardiovascular disease), s/p previous MI, s/p CABG in 1997, clinically stable.        2. Ischemic cardiomyopathy with an ejection fraction of about 30 to 35% with NYHA class III dyspnea.        3. Presence of biventricular implantable cardioverter-defibrillator (ICD) GEN change in 2009        4. Chronic systolic congestive heart failure, appears compensated, NYHA class III.  Basic Metabolic Panel        Recommendations  Since her kidney function is down with increased BUN/creatinine ratio indicating of intravascular volume depletion, will change the  bumetanide to 1 mg every other day.  Have discussed this with Ms. Kinney and she expressed understanding.  I have asked her to drink more water.  Continue with other medications.  BMP in a week.    Return in about 3 months (around 10/20/2023) for or sooner if needed.    As always, Kreis, Samuel Duane, MD  I appreciate very much the opportunity to participate in the cardiovascular care of your patients. Please do not hesitate to call me with any questions with regards to Kerri Kinney's evaluation and management.       With Best Regards,        Krish Ocasio MD, FACC    Please note that portions of this note were completed with a voice recognition program.

## 2023-08-25 DIAGNOSIS — I50.22 CHRONIC SYSTOLIC CONGESTIVE HEART FAILURE: ICD-10-CM

## 2023-08-25 RX ORDER — SACUBITRIL AND VALSARTAN 24; 26 MG/1; MG/1
TABLET, FILM COATED ORAL
Qty: 180 TABLET | Refills: 0 | Status: SHIPPED | OUTPATIENT
Start: 2023-08-25

## 2023-12-21 ENCOUNTER — OFFICE VISIT (OUTPATIENT)
Dept: CARDIOLOGY | Facility: CLINIC | Age: 78
End: 2023-12-21
Payer: MEDICARE

## 2023-12-21 VITALS
RESPIRATION RATE: 18 BRPM | HEART RATE: 88 BPM | BODY MASS INDEX: 23.39 KG/M2 | HEIGHT: 59 IN | SYSTOLIC BLOOD PRESSURE: 121 MMHG | WEIGHT: 116 LBS | OXYGEN SATURATION: 95 % | DIASTOLIC BLOOD PRESSURE: 71 MMHG

## 2023-12-21 DIAGNOSIS — R07.2 PRECORDIAL PAIN: Primary | ICD-10-CM

## 2023-12-21 DIAGNOSIS — I50.22 CHRONIC SYSTOLIC HEART FAILURE: ICD-10-CM

## 2023-12-21 DIAGNOSIS — Z01.810 PREOPERATIVE CARDIOVASCULAR EXAMINATION: ICD-10-CM

## 2023-12-21 DIAGNOSIS — I25.5 ISCHEMIC CARDIOMYOPATHY: ICD-10-CM

## 2023-12-21 DIAGNOSIS — I25.10 ASCVD (ARTERIOSCLEROTIC CARDIOVASCULAR DISEASE): ICD-10-CM

## 2023-12-21 NOTE — LETTER
December 31, 2023       No Recipients    Patient: Kerri Kinney   YOB: 1945   Date of Visit: 12/21/2023     Dear Samuel Duane Kreis, MD:       Thank you for referring Kerri Kinney to me for evaluation. Below are the relevant portions of my assessment and plan of care.    If you have questions, please do not hesitate to call me. I look forward to following Kerri along with you.         Sincerely,        Krish Ocasio MD        CC:   No Recipients    Krish Ocasio MD  12/31/23 1121  Sign when Signing Visit  Kreis, Samuel Duane, MD  Kerri Kinney  1945 12/21/2023    Patient Active Problem List   Diagnosis   •  ASCVD, S/P CABG 1997   • CKD (chronic kidney disease) (stage III)   •  S/P ICD with recent gen change 2009   • Preoperative cardiovascular examination   • History of chronic class II to III congestive heart failure   • Coronary artery disease   • Hypertension   • Depression   • Anxiety   • Hyperlipidemia   • GERD (gastroesophageal reflux disease)   • Dizziness   • Ischemic cardiomyopathy with LV ejection fraction of 30-35% in feb 2018 with NYHA class 2-3 dyspnea.   • Precordial pain   • Presence of biventricular implantable cardioverter-defibrillator (ICD)   • Chronic systolic heart failure, appears compensated.       Dear Kreis, Samuel Duane, MD:    Subjective    Kerri Kinney is a 78 y.o. female with the problems as listed above, presents    Chief complaint: Follow-up of coronary artery disease, recent chest pains and needs clearance for right shoulder surgery.    History of Present Illness: Ms. Jennifer Kinney is a very pleasant 78-year-old  female with history of known coronary artery disease, status post previous myocardial infarction's, s/p CABG 1997 and underlying advanced ischemic cardiomyopathy with LV ejection fraction of about 35%.  She presents today seeking preoperative cardiac evaluation and cardiac clearance for right shoulder surgery.  On further  questioning she does indicate some intermittent left-sided chest pains that seem to occur with mild exertion and resolved with rest.  She has chronic dyspnea with mild to moderate exertion with no PND, orthopnea pedal edema.  She denies any palpitations, dizziness or syncope.    Allergies   Allergen Reactions   • Codeine    • Demerol [Meperidine]    • Penicillins    • Terramycin [Oxytetracycline]    :    Current Outpatient Medications:   •  acetaminophen (TYLENOL) 325 MG tablet, Take 1 tablet by mouth Every 4 (Four) Hours As Needed for Fever., Disp: , Rfl:   •  allopurinol (ZYLOPRIM) 100 MG tablet, Take 1 tablet by mouth Daily., Disp: , Rfl:   •  aspirin 81 MG EC tablet, Take 1 tablet by mouth Daily., Disp: , Rfl:   •  atorvastatin (LIPITOR) 40 MG tablet, Take 1 tablet by mouth Daily., Disp: , Rfl:   •  bumetanide (BUMEX) 1 MG tablet, Take 1 tablet by mouth Daily. Take 2 tablets daily for 2 days and then 1 tablet daily after that., Disp: 30 tablet, Rfl: 2  •  carvedilol (COREG) 12.5 MG tablet, Take 1 tablet by mouth 2 (Two) Times a Day With Meals., Disp: , Rfl:   •  clonazePAM (KlonoPIN) 0.5 MG tablet, Take 1 tablet by mouth 2 (Two) Times a Day As Needed for Seizures., Disp: , Rfl:   •  dapagliflozin (Farxiga) 5 MG tablet tablet, Take 1 tablet by mouth Daily., Disp: , Rfl:   •  dexlansoprazole (DEXILANT) 60 MG capsule, Take 1 capsule by mouth Daily., Disp: , Rfl:   •  diclofenac (VOLTAREN) 1 % gel gel, Apply 4 g topically 4 (Four) Times a Day As Needed., Disp: , Rfl:   •  Entresto 24-26 MG tablet, TAKE 1 TABLET BY MOUTH TWICE A DAY, Disp: 180 tablet, Rfl: 0  •  Garlic 1000 MG capsule, Take 1 capsule by mouth Daily., Disp: , Rfl:   •  HYDROcodone-acetaminophen (NORCO) 5-325 MG per tablet, Take 1 tablet by mouth Every 6 (Six) Hours As Needed., Disp: , Rfl:   •  imipramine (TOFRANIL) 10 MG tablet, Take 1 tablet by mouth 4 (Four) Times a Day., Disp: , Rfl:   •  isosorbide mononitrate (IMDUR) 60 MG 24 hr tablet, TAKE ONE  TABLET BY MOUTH DAILY, Disp: 30 tablet, Rfl: 5  •  nitroglycerin (NITROSTAT) 0.4 MG SL tablet, 1 under the tongue as needed for angina, may repeat q5mins for up three doses, Disp: 25 tablet, Rfl: 3  •  Omega-3 Fatty Acids (fish oil) 1000 MG capsule capsule, Take  by mouth Daily With Breakfast., Disp: , Rfl:   •  tiZANidine (ZANAFLEX) 4 MG tablet, Take 1 tablet by mouth At Night As Needed for Muscle Spasms., Disp: , Rfl:   •  Vericiguat (Verquvo) 10 MG tablet, Take 1 tablet by mouth Every Morning., Disp: 30 tablet, Rfl: 2  •  Apoaequorin (PREVAGEN PO), Take 10 mg by mouth Daily. (Patient not taking: Reported on 2023), Disp: , Rfl:   •  gabapentin (NEURONTIN) 300 MG capsule, Take 1 capsule by mouth 3 (Three) Times a Day., Disp: , Rfl:   •  Magnesium 100 MG capsule, Take  by mouth. (Patient not taking: Reported on 2023), Disp: , Rfl:   •  spironolactone (ALDACTONE) 25 MG tablet, TAKE 1/2 TABLET BY MOUTH DAILY (Patient not taking: Reported on 2023), Disp: 30 tablet, Rfl: 3    The following portions of the patient's history were reviewed and updated as appropriate: allergies, current medications, past family history, past medical history, past social history, past surgical history and problem list.    Social History     Tobacco Use   • Smoking status: Former     Packs/day: 2     Types: Cigarettes     Quit date:      Years since quittin.9   • Smokeless tobacco: Never   Vaping Use   • Vaping Use: Never used   Substance Use Topics   • Alcohol use: No   • Drug use: No     Review of Systems   Constitutional: Negative for chills and fever.   HENT:  Negative for nosebleeds and sore throat.    Cardiovascular:  Positive for chest pain.   Respiratory:  Positive for shortness of breath. Negative for cough, hemoptysis and wheezing.    Gastrointestinal:  Negative for abdominal pain, hematemesis, hematochezia, melena, nausea and vomiting.   Genitourinary:  Negative for dysuria and hematuria.   Neurological:   "Negative for headaches.     Objective  Vitals:    12/21/23 1011   BP: 121/71   BP Location: Left arm   Patient Position: Sitting   Cuff Size: Small Adult   Pulse: 88   Resp: 18   SpO2: 95%   Weight: 52.6 kg (116 lb)   Height: 149.9 cm (59\")     Body mass index is 23.43 kg/m².    Vitals reviewed.   Constitutional:       Appearance: Well-developed.   Eyes:      Conjunctiva/sclera: Conjunctivae normal.   HENT:      Head: Normocephalic.   Neck:      Thyroid: No thyromegaly.      Vascular: No JVD.      Trachea: No tracheal deviation.   Pulmonary:      Effort: No respiratory distress.      Breath sounds: Normal breath sounds. No wheezing. No rales.   Cardiovascular:      PMI at left midclavicular line. Normal rate. Regular rhythm. Normal S1. Normal S2.       Murmurs: There is a grade 3/6 systolic murmur at the LLSB.      No gallop.  No click. No rub.   Pulses:     Intact distal pulses.   Edema:     Peripheral edema absent.   Abdominal:      General: Bowel sounds are normal.      Palpations: Abdomen is soft. There is no abdominal mass.      Tenderness: There is no abdominal tenderness.   Musculoskeletal:      Cervical back: Normal range of motion and neck supple. Skin:     General: Skin is warm and dry.   Neurological:      Mental Status: Alert and oriented to person, place, and time.      Cranial Nerves: No cranial nerve deficit.         Lab Results   Component Value Date     05/04/2023    K 5.0 05/04/2023     05/04/2023    CO2 27.0 05/04/2023    BUN 51 (H) 05/04/2023    CREATININE 1.24 (H) 05/04/2023    GLUCOSE 82 05/04/2023    CALCIUM 9.5 05/04/2023    AST 24 05/04/2023    ALT 32 05/04/2023    ALKPHOS 100 05/04/2023    LABIL2 2.8 (H) 05/31/2019     No results found for: \"CKTOTAL\"  Lab Results   Component Value Date    WBC 6.08 05/31/2016    HGB 13.1 05/31/2016    HCT 38.6 05/31/2016     05/31/2016     Lab Results   Component Value Date    INR 1.00 05/31/2016           Assessment & Plan   Diagnosis " Plan   1. Precordial pain  Stress Test With Myocardial Perfusion (1 Day)      2. ASCVD (arteriosclerotic cardiovascular disease), s/p previous MI, s/p CABG in 1997, with recent chest pains.        3. Preoperative cardiovascular examination        4. Ischemic cardiomyopathy with an ejection fraction of about 30 to 35% with NYHA class III dyspnea.  Adult Transthoracic Echo Complete w/ Color, Spectral and Contrast if necessary per protocol      5. Chronic systolic heart failure, appears compensated.  Adult Transthoracic Echo Complete w/ Color, Spectral and Contrast if necessary per protocol        Recommendations  In view of her recent chest pains, I have discussed with her about the option of further cardiac evaluation with a Lexiscan sestamibi study.  Patient is agreeable.  If this reveals no significant myocardial ischemia, she could be an acceptable although moderate cardiac risk for the shoulder surgery with the risk of mainly heart failure from her cardiomyopathy perioperatively.  Will obtain an echo Doppler study to reevaluate her LV systolic function.  For her coronary artery disease, continue with low-dose aspirin, atorvastatin and carvedilol.  For her  For her ischemic cardiomyopathy, continue with Entresto, carvedilol, spironolactone, Farxiga and Verquvo.    Return today (on 12/21/2023).    As always, Kreis, Samuel Duane, MD  I appreciate very much the opportunity to participate in the cardiovascular care of your patients. Please do not hesitate to call me with any questions with regards to Kerri Kinney's evaluation and management.       With Best Regards,        Krish Ocasio MD, [facility]    Please note that portions of this note were completed with a voice recognition program.

## 2023-12-21 NOTE — PROGRESS NOTES
Kreis, Samuel Duane, MD  Kerri Kinney  1945 12/21/2023    Patient Active Problem List   Diagnosis     ASCVD, S/P CABG 1997    CKD (chronic kidney disease) (stage III)     S/P ICD with recent gen change 2009    Preoperative cardiovascular examination    History of chronic class II to III congestive heart failure    Coronary artery disease    Hypertension    Depression    Anxiety    Hyperlipidemia    GERD (gastroesophageal reflux disease)    Dizziness    Ischemic cardiomyopathy with LV ejection fraction of 30-35% in feb 2018 with NYHA class 2-3 dyspnea.    Precordial pain    Presence of biventricular implantable cardioverter-defibrillator (ICD)    Chronic systolic heart failure, appears compensated.       Dear Kreis, Samuel Duane, MD:    Subjective     Kerri Kinney is a 78 y.o. female with the problems as listed above, presents    Chief complaint: Follow-up of coronary artery disease, recent chest pains and needs clearance for right shoulder surgery.    History of Present Illness: Ms. Jennifer Kinney is a very pleasant 78-year-old  female with history of known coronary artery disease, status post previous myocardial infarction's, s/p CABG 1997 and underlying advanced ischemic cardiomyopathy with LV ejection fraction of about 35%.  She presents today seeking preoperative cardiac evaluation and cardiac clearance for right shoulder surgery.  On further questioning she does indicate some intermittent left-sided chest pains that seem to occur with mild exertion and resolved with rest.  She has chronic dyspnea with mild to moderate exertion with no PND, orthopnea pedal edema.  She denies any palpitations, dizziness or syncope.    Allergies   Allergen Reactions    Codeine     Demerol [Meperidine]     Penicillins     Terramycin [Oxytetracycline]    :    Current Outpatient Medications:     acetaminophen (TYLENOL) 325 MG tablet, Take 1 tablet by mouth Every 4 (Four) Hours As Needed for Fever., Disp: , Rfl:      allopurinol (ZYLOPRIM) 100 MG tablet, Take 1 tablet by mouth Daily., Disp: , Rfl:     aspirin 81 MG EC tablet, Take 1 tablet by mouth Daily., Disp: , Rfl:     atorvastatin (LIPITOR) 40 MG tablet, Take 1 tablet by mouth Daily., Disp: , Rfl:     bumetanide (BUMEX) 1 MG tablet, Take 1 tablet by mouth Daily. Take 2 tablets daily for 2 days and then 1 tablet daily after that., Disp: 30 tablet, Rfl: 2    carvedilol (COREG) 12.5 MG tablet, Take 1 tablet by mouth 2 (Two) Times a Day With Meals., Disp: , Rfl:     clonazePAM (KlonoPIN) 0.5 MG tablet, Take 1 tablet by mouth 2 (Two) Times a Day As Needed for Seizures., Disp: , Rfl:     dapagliflozin (Farxiga) 5 MG tablet tablet, Take 1 tablet by mouth Daily., Disp: , Rfl:     dexlansoprazole (DEXILANT) 60 MG capsule, Take 1 capsule by mouth Daily., Disp: , Rfl:     diclofenac (VOLTAREN) 1 % gel gel, Apply 4 g topically 4 (Four) Times a Day As Needed., Disp: , Rfl:     Entresto 24-26 MG tablet, TAKE 1 TABLET BY MOUTH TWICE A DAY, Disp: 180 tablet, Rfl: 0    Garlic 1000 MG capsule, Take 1 capsule by mouth Daily., Disp: , Rfl:     HYDROcodone-acetaminophen (NORCO) 5-325 MG per tablet, Take 1 tablet by mouth Every 6 (Six) Hours As Needed., Disp: , Rfl:     imipramine (TOFRANIL) 10 MG tablet, Take 1 tablet by mouth 4 (Four) Times a Day., Disp: , Rfl:     isosorbide mononitrate (IMDUR) 60 MG 24 hr tablet, TAKE ONE TABLET BY MOUTH DAILY, Disp: 30 tablet, Rfl: 5    nitroglycerin (NITROSTAT) 0.4 MG SL tablet, 1 under the tongue as needed for angina, may repeat q5mins for up three doses, Disp: 25 tablet, Rfl: 3    Omega-3 Fatty Acids (fish oil) 1000 MG capsule capsule, Take  by mouth Daily With Breakfast., Disp: , Rfl:     tiZANidine (ZANAFLEX) 4 MG tablet, Take 1 tablet by mouth At Night As Needed for Muscle Spasms., Disp: , Rfl:     Vericiguat (Verquvo) 10 MG tablet, Take 1 tablet by mouth Every Morning., Disp: 30 tablet, Rfl: 2    Apoaequorin (PREVAGEN PO), Take 10 mg by mouth  "Daily. (Patient not taking: Reported on 2023), Disp: , Rfl:     gabapentin (NEURONTIN) 300 MG capsule, Take 1 capsule by mouth 3 (Three) Times a Day., Disp: , Rfl:     Magnesium 100 MG capsule, Take  by mouth. (Patient not taking: Reported on 2023), Disp: , Rfl:     spironolactone (ALDACTONE) 25 MG tablet, TAKE 1/2 TABLET BY MOUTH DAILY (Patient not taking: Reported on 2023), Disp: 30 tablet, Rfl: 3    The following portions of the patient's history were reviewed and updated as appropriate: allergies, current medications, past family history, past medical history, past social history, past surgical history and problem list.    Social History     Tobacco Use    Smoking status: Former     Packs/day: 2     Types: Cigarettes     Quit date:      Years since quittin.9    Smokeless tobacco: Never   Vaping Use    Vaping Use: Never used   Substance Use Topics    Alcohol use: No    Drug use: No     Review of Systems   Constitutional: Negative for chills and fever.   HENT:  Negative for nosebleeds and sore throat.    Cardiovascular:  Positive for chest pain.   Respiratory:  Positive for shortness of breath. Negative for cough, hemoptysis and wheezing.    Gastrointestinal:  Negative for abdominal pain, hematemesis, hematochezia, melena, nausea and vomiting.   Genitourinary:  Negative for dysuria and hematuria.   Neurological:  Negative for headaches.     Objective   Vitals:    23 1011   BP: 121/71   BP Location: Left arm   Patient Position: Sitting   Cuff Size: Small Adult   Pulse: 88   Resp: 18   SpO2: 95%   Weight: 52.6 kg (116 lb)   Height: 149.9 cm (59\")     Body mass index is 23.43 kg/m².    Vitals reviewed.   Constitutional:       Appearance: Well-developed.   Eyes:      Conjunctiva/sclera: Conjunctivae normal.   HENT:      Head: Normocephalic.   Neck:      Thyroid: No thyromegaly.      Vascular: No JVD.      Trachea: No tracheal deviation.   Pulmonary:      Effort: No respiratory " "distress.      Breath sounds: Normal breath sounds. No wheezing. No rales.   Cardiovascular:      PMI at left midclavicular line. Normal rate. Regular rhythm. Normal S1. Normal S2.       Murmurs: There is a grade 3/6 systolic murmur at the LLSB.      No gallop.  No click. No rub.   Pulses:     Intact distal pulses.   Edema:     Peripheral edema absent.   Abdominal:      General: Bowel sounds are normal.      Palpations: Abdomen is soft. There is no abdominal mass.      Tenderness: There is no abdominal tenderness.   Musculoskeletal:      Cervical back: Normal range of motion and neck supple. Skin:     General: Skin is warm and dry.   Neurological:      Mental Status: Alert and oriented to person, place, and time.      Cranial Nerves: No cranial nerve deficit.         Lab Results   Component Value Date     05/04/2023    K 5.0 05/04/2023     05/04/2023    CO2 27.0 05/04/2023    BUN 51 (H) 05/04/2023    CREATININE 1.24 (H) 05/04/2023    GLUCOSE 82 05/04/2023    CALCIUM 9.5 05/04/2023    AST 24 05/04/2023    ALT 32 05/04/2023    ALKPHOS 100 05/04/2023    LABIL2 2.8 (H) 05/31/2019     No results found for: \"CKTOTAL\"  Lab Results   Component Value Date    WBC 6.08 05/31/2016    HGB 13.1 05/31/2016    HCT 38.6 05/31/2016     05/31/2016     Lab Results   Component Value Date    INR 1.00 05/31/2016           Assessment & Plan    Diagnosis Plan   1. Precordial pain  Stress Test With Myocardial Perfusion (1 Day)      2. ASCVD (arteriosclerotic cardiovascular disease), s/p previous MI, s/p CABG in 1997, with recent chest pains.        3. Preoperative cardiovascular examination        4. Ischemic cardiomyopathy with an ejection fraction of about 30 to 35% with NYHA class III dyspnea.  Adult Transthoracic Echo Complete w/ Color, Spectral and Contrast if necessary per protocol      5. Chronic systolic heart failure, appears compensated.  Adult Transthoracic Echo Complete w/ Color, Spectral and Contrast if " necessary per protocol        Recommendations  In view of her recent chest pains, I have discussed with her about the option of further cardiac evaluation with a Lexiscan sestamibi study.  Patient is agreeable.  If this reveals no significant myocardial ischemia, she could be an acceptable although moderate cardiac risk for the shoulder surgery with the risk of mainly heart failure from her cardiomyopathy perioperatively.  Will obtain an echo Doppler study to reevaluate her LV systolic function.  For her coronary artery disease, continue with low-dose aspirin, atorvastatin and carvedilol.  For her  For her ischemic cardiomyopathy, continue with Entresto, carvedilol, spironolactone, Farxiga and Verquvo.    Return today (on 12/21/2023).    As always, Kreis, Samuel Duane, MD  I appreciate very much the opportunity to participate in the cardiovascular care of your patients. Please do not hesitate to call me with any questions with regards to Kerri Kinney's evaluation and management.       With Best Regards,        Krish Ocasio MD, FACC    Please note that portions of this note were completed with a voice recognition program.

## 2024-02-08 ENCOUNTER — HOSPITAL ENCOUNTER (OUTPATIENT)
Dept: NUCLEAR MEDICINE | Facility: HOSPITAL | Age: 79
Discharge: HOME | End: 2024-02-08
Payer: MEDICARE

## 2024-02-08 ENCOUNTER — HOSPITAL ENCOUNTER (OUTPATIENT)
Dept: CARDIOLOGY | Facility: HOSPITAL | Age: 79
Discharge: HOME | End: 2024-02-08
Payer: MEDICARE

## 2024-02-08 ENCOUNTER — HOSPITAL ENCOUNTER (OUTPATIENT)
Dept: CARDIOLOGY | Facility: HOSPITAL | Age: 79
Discharge: HOME | End: 2024-02-08
Admitting: INTERNAL MEDICINE
Payer: MEDICARE

## 2024-02-08 DIAGNOSIS — I50.22 CHRONIC SYSTOLIC HEART FAILURE: ICD-10-CM

## 2024-02-08 DIAGNOSIS — R07.2 PRECORDIAL PAIN: ICD-10-CM

## 2024-02-08 DIAGNOSIS — I25.5 ISCHEMIC CARDIOMYOPATHY: ICD-10-CM

## 2024-02-08 LAB
BH CV NUCLEAR PRIOR STUDY: 3
BH CV REST NUCLEAR ISOTOPE DOSE: 9.7 MCI
BH CV STRESS BP STAGE 1: NORMAL
BH CV STRESS COMMENTS STAGE 1: NORMAL
BH CV STRESS DOSE REGADENOSON STAGE 1: 0.4
BH CV STRESS DURATION MIN STAGE 1: 0
BH CV STRESS DURATION SEC STAGE 1: 10
BH CV STRESS HR STAGE 1: 104
BH CV STRESS NUCLEAR ISOTOPE DOSE: 28.1 MCI
BH CV STRESS PROTOCOL 1: NORMAL
BH CV STRESS RECOVERY BP: NORMAL MMHG
BH CV STRESS RECOVERY HR: 95 BPM
BH CV STRESS STAGE 1: 1
LV EF NUC BP: 27 %
MAXIMAL PREDICTED HEART RATE: 143 BPM
PERCENT MAX PREDICTED HR: 72.73 %
STRESS BASELINE BP: NORMAL MMHG
STRESS BASELINE HR: 92 BPM
STRESS PERCENT HR: 86 %
STRESS POST PEAK BP: NORMAL MMHG
STRESS POST PEAK HR: 104 BPM
STRESS TARGET HR: 122 BPM

## 2024-02-08 PROCEDURE — 93017 CV STRESS TEST TRACING ONLY: CPT

## 2024-02-08 PROCEDURE — 0 TECHNETIUM SESTAMIBI: Performed by: INTERNAL MEDICINE

## 2024-02-08 PROCEDURE — 78452 HT MUSCLE IMAGE SPECT MULT: CPT

## 2024-02-08 PROCEDURE — 93306 TTE W/DOPPLER COMPLETE: CPT

## 2024-02-08 PROCEDURE — A9500 TC99M SESTAMIBI: HCPCS | Performed by: INTERNAL MEDICINE

## 2024-02-08 PROCEDURE — 25010000002 REGADENOSON 0.4 MG/5ML SOLUTION: Performed by: INTERNAL MEDICINE

## 2024-02-08 PROCEDURE — 25010000002 SULFUR HEXAFLUORIDE MICROSPH 60.7-25 MG RECONSTITUTED SUSPENSION: Performed by: INTERNAL MEDICINE

## 2024-02-08 RX ORDER — REGADENOSON 0.08 MG/ML
0.4 INJECTION, SOLUTION INTRAVENOUS
Status: COMPLETED | OUTPATIENT
Start: 2024-02-08 | End: 2024-02-08

## 2024-02-08 RX ADMIN — REGADENOSON 0.4 MG: 0.08 INJECTION, SOLUTION INTRAVENOUS at 12:27

## 2024-02-08 RX ADMIN — SULFUR HEXAFLUORIDE 2 ML: KIT at 10:52

## 2024-02-08 RX ADMIN — TECHNETIUM TC 99M SESTAMIBI 1 DOSE: 1 INJECTION INTRAVENOUS at 11:15

## 2024-02-08 RX ADMIN — TECHNETIUM TC 99M SESTAMIBI 1 DOSE: 1 INJECTION INTRAVENOUS at 12:27

## 2024-02-11 LAB
BH CV ECHO MEAS - ACS: 1 CM
BH CV ECHO MEAS - AI P1/2T: 450.2 MSEC
BH CV ECHO MEAS - AO MAX PG: 10 MMHG
BH CV ECHO MEAS - AO MEAN PG: 6.6 MMHG
BH CV ECHO MEAS - AO ROOT DIAM: 3.1 CM
BH CV ECHO MEAS - AO V2 MAX: 157.2 CM/SEC
BH CV ECHO MEAS - AO V2 VTI: 26.8 CM
BH CV ECHO MEAS - AVA(I,D): 2.29 CM2
BH CV ECHO MEAS - EDV(CUBED): 144.7 ML
BH CV ECHO MEAS - ESV(CUBED): 68.9 ML
BH CV ECHO MEAS - FS: 21.9 %
BH CV ECHO MEAS - IVS/LVPW: 0.73 CM
BH CV ECHO MEAS - IVSD: 0.55 CM
BH CV ECHO MEAS - LA DIMENSION: 3.6 CM
BH CV ECHO MEAS - LAT PEAK E' VEL: 13.1 CM/SEC
BH CV ECHO MEAS - LV MASS(C)D: 114 GRAMS
BH CV ECHO MEAS - LV MAX PG: 4.9 MMHG
BH CV ECHO MEAS - LV MEAN PG: 2 MMHG
BH CV ECHO MEAS - LV V1 MAX: 111 CM/SEC
BH CV ECHO MEAS - LV V1 VTI: 19.5 CM
BH CV ECHO MEAS - LVIDD: 5.3 CM
BH CV ECHO MEAS - LVIDS: 4.1 CM
BH CV ECHO MEAS - LVOT AREA: 3.1 CM2
BH CV ECHO MEAS - LVOT DIAM: 2 CM
BH CV ECHO MEAS - LVPWD: 0.75 CM
BH CV ECHO MEAS - MED PEAK E' VEL: 6.2 CM/SEC
BH CV ECHO MEAS - MV E MAX VEL: 118 CM/SEC
BH CV ECHO MEAS - PA ACC TIME: 0.06 SEC
BH CV ECHO MEAS - SV(LVOT): 61.3 ML
BH CV ECHO MEASUREMENTS AVERAGE E/E' RATIO: 12.23
LEFT ATRIUM VOLUME INDEX: 23.8 ML/M2

## 2024-02-22 ENCOUNTER — OFFICE VISIT (OUTPATIENT)
Dept: CARDIOLOGY | Facility: CLINIC | Age: 79
End: 2024-02-22
Payer: MEDICARE

## 2024-02-22 VITALS
OXYGEN SATURATION: 95 % | DIASTOLIC BLOOD PRESSURE: 63 MMHG | HEART RATE: 83 BPM | WEIGHT: 113 LBS | SYSTOLIC BLOOD PRESSURE: 116 MMHG | RESPIRATION RATE: 16 BRPM | HEIGHT: 59 IN | BODY MASS INDEX: 22.78 KG/M2

## 2024-02-22 DIAGNOSIS — I25.10 ASCVD (ARTERIOSCLEROTIC CARDIOVASCULAR DISEASE): Primary | ICD-10-CM

## 2024-02-22 DIAGNOSIS — N18.31 STAGE 3A CHRONIC KIDNEY DISEASE: ICD-10-CM

## 2024-02-22 DIAGNOSIS — Z01.810 PREOPERATIVE CARDIOVASCULAR EXAMINATION: ICD-10-CM

## 2024-02-22 DIAGNOSIS — I50.22 CHRONIC SYSTOLIC HEART FAILURE: ICD-10-CM

## 2024-02-22 DIAGNOSIS — I25.5 ISCHEMIC CARDIOMYOPATHY: ICD-10-CM

## 2024-02-22 RX ORDER — MEMANTINE HYDROCHLORIDE 5 MG/1
5 TABLET ORAL 2 TIMES DAILY
COMMUNITY

## 2024-02-22 RX ORDER — DONEPEZIL HYDROCHLORIDE 5 MG/1
5 TABLET, FILM COATED ORAL NIGHTLY
COMMUNITY

## 2024-02-22 NOTE — LETTER
February 22, 2024     Samuel Duane Kreis, MD  [address] [place] [address] [place] [address]    Patient: Kerri Kinney   YOB: 1945   Date of Visit: 2/22/2024     Dear Samuel Duane Kreis, MD:       Thank you for referring Kerri Kinney to me for evaluation. Below are the relevant portions of my assessment and plan of care.    If you have questions, please do not hesitate to call me. I look forward to following Kerri along with you.         Sincerely,        Krish Ocasio MD        CC: MD Amarjit Saldana Pramod A, MD  02/22/24 1136  Sign when Signing Visit  Kreis, Samuel Duane, MD  Kerri Kinney  1945 02/22/2024    Patient Active Problem List   Diagnosis   •  ASCVD, S/P CABG 1997   • CKD (chronic kidney disease) (stage III)   •  S/P ICD with recent gen change 2009   • Preoperative cardiovascular examination   • History of chronic class II to III congestive heart failure   • Coronary artery disease   • Hypertension   • Depression   • Anxiety   • Hyperlipidemia   • GERD (gastroesophageal reflux disease)   • Dizziness   • Ischemic cardiomyopathy with LV ejection fraction of 30-35% in feb 2018 with NYHA class 2-3 dyspnea.   • Precordial pain   • Presence of biventricular implantable cardioverter-defibrillator (ICD)   • Chronic systolic heart failure, appears compensated.       Dear Kreis, Samuel Duane, MD:    Subjective    Kerri Kinney is a 78 y.o. female with the problems as listed above, presents    Chief Complaint   Patient presents with   • Follow-up     Stress and echo    • Surgical Clearance     Shoulder replace       History of Present Illness:  Ms. Kinney is a pleasant 78-year-old  female with a history of known significant coronary artery disease, status post previous myocardial infarction's involving the anteroseptal and apical myocardial segment, s/p CABG 1997 with underlying advanced ischemic dilated cardiomyopathy with LV ejection fraction of about 31 to 35%.  She is  here seeking cardiac clearance for right shoulder surgery.  She recently underwent ischemic evaluation with a Lexiscan sestamibi study that revealed no evidence of myocardial ischemia with only a large anterior septal and apical myocardial scar from previous myocardial infarction.  On further questioning she denies any recent chest pains since the last visit.  She has chronic dyspnea with mild to moderate exertion with no PND, orthopnea pedal remail.  She in fact has lost    Regadenoson Stress Test with Myocardial Perfusion SPECT (Multi Study)    Accession Number: [id number]   Date of Study: 2/8/24   Ordering Provider: Krish Ocasio MD   Clinical Indications: Chest Pain        Interpreting Physicians  Performing Staff   Krish Ocasio MD Tech: Jose Guadalupe Torres, RN   Support Staff: Rambo Roach        Interpretation Summary   •  A pharmacological stress test was performed using regadenoson without low-level exercise.  •  Findings consistent with a normal ECG stress test.  •  Myocardial perfusion imaging indicates a moderate-to-large-sized infarct located in the anterior wall, septal wall and apex with no significant ischemia noted.  •  Abnormal LV wall motion consistent with dyskinesis of the anterioseptal wall and LV apex.  •  Left ventricular ejection fraction is severely reduced (Calculated EF = 27%).  •  Impressions are consistent with an intermediate risk study.      Complete Transthoracic Echocardiogram with Complete Doppler, Color Flow and Contrast    Accession Number: [id number]   Date of Study: 2/8/24   Ordering Provider: Krish Ocasio MD   Clinical Indications: Heart Failure, Cardiomyopathy, or Sytemic or Pulmonary Hypertension        Interpreting Physicians  Performing Staff   Krish Ocasio MD Tech: Kavya Andre          Clinical Indication    Heart Failure, Cardiomyopathy, or Sytemic or Pulmonary Hypertension   Dx: Ischemic cardiomyopathy with an ejection fraction of about 30 to 35% with  NYHA class III dyspnea. [I25.5 (ICD-10-CM)]; Chronic systolic heart failure, appears compensated. [I50.22 (ICD-10-CM)]     Interpretation Summary   •  The left ventricular cavity is mildly dilated.  •  The following left ventricular wall segments are hypokinetic: mid anterior, apical anterior and basal anterior. The following left ventricular wall segments are akinetic: apical inferior, apical septal, basal inferoseptal, mid inferoseptal and apex.  •  Left ventricular systolic function is severely decreased. Left ventricular ejection fraction appears to be 31 - 35%.  •  There is mild calcification of the aortic valve. The aortic valve was poorly visualized but appears trileaflet. Mild aortic valve regurgitation is present. No hemodynamically significant aortic valve stenosis is present.  •  There is moderate calcification of the mitral valve. Mild mitral valve regurgitation is present. No significant mitral valve stenosis is present.  •  There is no evidence of pericardial effusion. .  •  No significant change since the study on 6/25/19.    Allergies   Allergen Reactions   • Codeine    • Demerol [Meperidine]    • Penicillins    • Terramycin [Oxytetracycline]    :    Current Outpatient Medications:   •  acetaminophen (TYLENOL) 325 MG tablet, Take 1 tablet by mouth Every 4 (Four) Hours As Needed for Fever., Disp: , Rfl:   •  allopurinol (ZYLOPRIM) 100 MG tablet, Take 1 tablet by mouth Daily., Disp: , Rfl:   •  aspirin 81 MG EC tablet, Take 1 tablet by mouth Daily., Disp: , Rfl:   •  atorvastatin (LIPITOR) 40 MG tablet, Take 1 tablet by mouth Daily., Disp: , Rfl:   •  bumetanide (BUMEX) 1 MG tablet, Take 1 tablet by mouth Daily. Take 2 tablets daily for 2 days and then 1 tablet daily after that., Disp: 30 tablet, Rfl: 2  •  carvedilol (COREG) 12.5 MG tablet, Take 3.125 mg by mouth 2 (Two) Times a Day With Meals., Disp: , Rfl:   •  dapagliflozin (Farxiga) 5 MG tablet tablet, Take 1 tablet by mouth Daily., Disp: , Rfl:    •  dexlansoprazole (DEXILANT) 60 MG capsule, Take 1 capsule by mouth Daily., Disp: , Rfl:   •  diclofenac (VOLTAREN) 1 % gel gel, Apply 4 g topically 4 (Four) Times a Day As Needed., Disp: , Rfl:   •  Entresto 24-26 MG tablet, TAKE 1 TABLET BY MOUTH TWICE A DAY, Disp: 180 tablet, Rfl: 0  •  Garlic 1000 MG capsule, Take 1 capsule by mouth Daily., Disp: , Rfl:   •  HYDROcodone-acetaminophen (NORCO) 5-325 MG per tablet, Take 1 tablet by mouth Every 6 (Six) Hours As Needed., Disp: , Rfl:   •  imipramine (TOFRANIL) 10 MG tablet, Take 1 tablet by mouth 4 (Four) Times a Day., Disp: , Rfl:   •  isosorbide mononitrate (IMDUR) 60 MG 24 hr tablet, TAKE ONE TABLET BY MOUTH DAILY, Disp: 30 tablet, Rfl: 5  •  nitroglycerin (NITROSTAT) 0.4 MG SL tablet, 1 under the tongue as needed for angina, may repeat q5mins for up three doses, Disp: 25 tablet, Rfl: 3  •  Omega-3 Fatty Acids (fish oil) 1000 MG capsule capsule, Take  by mouth Daily With Breakfast., Disp: , Rfl:   •  tiZANidine (ZANAFLEX) 4 MG tablet, Take 1 tablet by mouth At Night As Needed for Muscle Spasms., Disp: , Rfl:   •  Vericiguat (Verquvo) 10 MG tablet, Take 1 tablet by mouth Every Morning., Disp: 30 tablet, Rfl: 2  •  donepezil (ARICEPT) 5 MG tablet, Take 1 tablet by mouth Every Night., Disp: , Rfl:   •  memantine (NAMENDA) 5 MG tablet, Take 1 tablet by mouth 2 (Two) Times a Day., Disp: , Rfl:     The following portions of the patient's history were reviewed and updated as appropriate: allergies, current medications, past family history, past medical history, past social history, past surgical history and problem list.    Social History     Tobacco Use   • Smoking status: Former     Packs/day: 2     Types: Cigarettes     Quit date:      Years since quittin.1   • Smokeless tobacco: Never   Vaping Use   • Vaping Use: Never used   Substance Use Topics   • Alcohol use: No   • Drug use: No     Review of Systems   Constitutional: Negative for chills and fever.  "  HENT:  Negative for nosebleeds and sore throat.    Respiratory:  Positive for shortness of breath. Negative for cough, hemoptysis and wheezing.    Musculoskeletal:  Positive for joint pain.   Gastrointestinal:  Negative for abdominal pain, hematemesis, hematochezia, melena, nausea and vomiting.   Genitourinary:  Negative for dysuria and hematuria.   Neurological:  Negative for headaches.                                             2/22/2024 12/21/2023 7/20/2023  /63 121/71 156/77   Heart Rate 83 88 97   Resp 16 18 --   SpO2 95 % 95 % 99 %   Weight 51.3 kg (113 lb) 52.6 kg (116 lb) 53.1 kg (117 lb)   Height 149.9 cm (59\") 149.9 cm (59\") 149.9 cm (59\")   BMI (Calculated) 22.8 23.4 23.6     Objective  Vitals:    02/22/24 1045   BP: 116/63   Pulse: 83   Resp: 16   SpO2: 95%   Weight: 51.3 kg (113 lb)   Height: 149.9 cm (59\")     Body mass index is 22.82 kg/m².    Vitals reviewed.   Constitutional:       Appearance: Well-developed.   Eyes:      Conjunctiva/sclera: Conjunctivae normal.   HENT:      Head: Normocephalic.   Neck:      Thyroid: No thyromegaly.      Vascular: No JVD.      Trachea: No tracheal deviation.   Pulmonary:      Effort: No respiratory distress.      Breath sounds: Normal breath sounds. No wheezing. No rales.   Cardiovascular:      PMI at left midclavicular line. Normal rate. Regular rhythm. Normal S1. Normal S2.       Murmurs: There is no murmur.      No gallop.  No click. No rub.   Pulses:     Intact distal pulses.   Edema:     Peripheral edema absent.   Abdominal:      General: Bowel sounds are normal.      Palpations: Abdomen is soft. There is no abdominal mass.      Tenderness: There is no abdominal tenderness.   Musculoskeletal:      Cervical back: Normal range of motion and neck supple. Skin:     General: Skin is warm and dry.   Neurological:      Mental Status: Alert and oriented to person, place, and time.      Cranial Nerves: No " "cranial nerve deficit.       Lab Results   Component Value Date     05/04/2023    K 5.0 05/04/2023     05/04/2023    CO2 27.0 05/04/2023    BUN 51 (H) 05/04/2023    CREATININE 1.24 (H) 05/04/2023    GLUCOSE 82 05/04/2023    CALCIUM 9.5 05/04/2023    AST 24 05/04/2023    ALT 32 05/04/2023    ALKPHOS 100 05/04/2023    LABIL2 2.8 (H) 05/31/2019     No results found for: \"CKTOTAL\"  Lab Results   Component Value Date    WBC 6.08 05/31/2016    HGB 13.1 05/31/2016    HCT 38.6 05/31/2016     05/31/2016     Lab Results   Component Value Date    INR 1.00 05/31/2016       Assessment & Plan   Diagnosis Plan   1. ASCVD (arteriosclerotic cardiovascular disease), s/p previous MI, s/p CABG in 1997, with stable angina.        2. Ischemic cardiomyopathy with an ejection fraction of about 30 to 35% with NYHA class III dyspnea.        3. Chronic systolic heart failure, appears compensated.        4. Preoperative cardiovascular examination for right shoulder surgery.        5. Stage 3a chronic kidney disease          Recommendations  I have discussed the results of the recent nuclear stress test and echo Doppler study with the patient.  Since her cardiac status appears to be relatively stable at this time, I told her will go ahead and clear her for the right shoulder surgery if this is going to benefit her significantly improving the quality of her life although there is moderate cardiac risk of perioperative cardiac event such as congestive heart failure due to her underlying advanced ischemic cardiomyopathy.  One has to be very careful about giving her too much fluids around the surgery to avoid acute decompensated heart failure.  Continue her cardiac medications perioperatively.  Continue with low-dose aspirin perioperatively if possible.  If not may hold it for 4 to 5 days prior to surgery and restart as soon as possible after the surgery.    Return in about 2 months (around 4/22/2024).    As always, Tomi Torres " Duane, MD  I appreciate very much the opportunity to participate in the cardiovascular care of your patients. Please do not hesitate to call me with any questions with regards to Kerri Kinney's evaluation and management.       With Best Regards,        Krish Ocasio MD, [facility]    Please note that portions of this note were completed with a voice recognition program.

## 2024-02-22 NOTE — PROGRESS NOTES
Kreis, Samuel Duane, MD  Kerri Kinney  1945 02/22/2024    Patient Active Problem List   Diagnosis     ASCVD, S/P CABG 1997    CKD (chronic kidney disease) (stage III)     S/P ICD with recent gen change 2009    Preoperative cardiovascular examination    History of chronic class II to III congestive heart failure    Coronary artery disease    Hypertension    Depression    Anxiety    Hyperlipidemia    GERD (gastroesophageal reflux disease)    Dizziness    Ischemic cardiomyopathy with LV ejection fraction of 30-35% in feb 2018 with NYHA class 2-3 dyspnea.    Precordial pain    Presence of biventricular implantable cardioverter-defibrillator (ICD)    Chronic systolic heart failure, appears compensated.       Dear Kreis, Samuel Duane, MD:    Subjective     Kerri Kinney is a 78 y.o. female with the problems as listed above, presents    Chief Complaint   Patient presents with    Follow-up     Stress and echo     Surgical Clearance     Shoulder replace       History of Present Illness: Ms. Kinney is a pleasant 78-year-old  female with a history of known significant coronary artery disease, status post previous myocardial infarction's involving the anteroseptal and apical myocardial segment, s/p CABG 1997 with underlying advanced ischemic dilated cardiomyopathy with LV ejection fraction of about 31 to 35%.  She is here seeking cardiac clearance for right shoulder surgery.  She recently underwent ischemic evaluation with a Lexiscan sestamibi study that revealed no evidence of myocardial ischemia with only a large anterior septal and apical myocardial scar from previous myocardial infarction.  On further questioning she denies any recent chest pains since the last visit.  She has chronic dyspnea with mild to moderate exertion with no PND, orthopnea pedal remail.  She in fact has lost 3 pounds since 12/21/2023.    Regadenoson Stress Test with Myocardial Perfusion SPECT (Multi Study)    Accession Number:  [id number]   Date of Study: 2/8/24   Ordering Provider: Krish Ocasio MD   Clinical Indications: Chest Pain        Interpreting Physicians  Performing Staff   Krish Ocasio MD Tech: Jose Guadalupe Torres RN   Support Staff: Rambo Roach        Interpretation Summary     A pharmacological stress test was performed using regadenoson without low-level exercise.    Findings consistent with a normal ECG stress test.    Myocardial perfusion imaging indicates a moderate-to-large-sized infarct located in the anterior wall, septal wall and apex with no significant ischemia noted.    Abnormal LV wall motion consistent with dyskinesis of the anterioseptal wall and LV apex.    Left ventricular ejection fraction is severely reduced (Calculated EF = 27%).    Impressions are consistent with an intermediate risk study.      Complete Transthoracic Echocardiogram with Complete Doppler, Color Flow and Contrast    Accession Number: [id number]   Date of Study: 2/8/24   Ordering Provider: Krish Ocasio MD   Clinical Indications: Heart Failure, Cardiomyopathy, or Sytemic or Pulmonary Hypertension        Interpreting Physicians  Performing Staff   Krish Ocasio MD Tech: Kavya Andre        Clinical Indication  Heart Failure, Cardiomyopathy, or Sytemic or Pulmonary Hypertension   Dx: Ischemic cardiomyopathy with an ejection fraction of about 30 to 35% with NYHA class III dyspnea. [I25.5 (ICD-10-CM)]; Chronic systolic heart failure, appears compensated. [I50.22 (ICD-10-CM)]     Interpretation Summary     The left ventricular cavity is mildly dilated.    The following left ventricular wall segments are hypokinetic: mid anterior, apical anterior and basal anterior. The following left ventricular wall segments are akinetic: apical inferior, apical septal, basal inferoseptal, mid inferoseptal and apex.    Left ventricular systolic function is severely decreased. Left ventricular ejection fraction appears to be 31 - 35%.    There  is mild calcification of the aortic valve. The aortic valve was poorly visualized but appears trileaflet. Mild aortic valve regurgitation is present. No hemodynamically significant aortic valve stenosis is present.    There is moderate calcification of the mitral valve. Mild mitral valve regurgitation is present. No significant mitral valve stenosis is present.    There is no evidence of pericardial effusion. .    No significant change since the study on 6/25/19.    Allergies   Allergen Reactions    Codeine     Demerol [Meperidine]     Penicillins     Terramycin [Oxytetracycline]    :    Current Outpatient Medications:     acetaminophen (TYLENOL) 325 MG tablet, Take 1 tablet by mouth Every 4 (Four) Hours As Needed for Fever., Disp: , Rfl:     allopurinol (ZYLOPRIM) 100 MG tablet, Take 1 tablet by mouth Daily., Disp: , Rfl:     aspirin 81 MG EC tablet, Take 1 tablet by mouth Daily., Disp: , Rfl:     atorvastatin (LIPITOR) 40 MG tablet, Take 1 tablet by mouth Daily., Disp: , Rfl:     bumetanide (BUMEX) 1 MG tablet, Take 1 tablet by mouth Daily. Take 2 tablets daily for 2 days and then 1 tablet daily after that., Disp: 30 tablet, Rfl: 2    carvedilol (COREG) 12.5 MG tablet, Take 3.125 mg by mouth 2 (Two) Times a Day With Meals., Disp: , Rfl:     dapagliflozin (Farxiga) 5 MG tablet tablet, Take 1 tablet by mouth Daily., Disp: , Rfl:     dexlansoprazole (DEXILANT) 60 MG capsule, Take 1 capsule by mouth Daily., Disp: , Rfl:     diclofenac (VOLTAREN) 1 % gel gel, Apply 4 g topically 4 (Four) Times a Day As Needed., Disp: , Rfl:     Entresto 24-26 MG tablet, TAKE 1 TABLET BY MOUTH TWICE A DAY, Disp: 180 tablet, Rfl: 0    Garlic 1000 MG capsule, Take 1 capsule by mouth Daily., Disp: , Rfl:     HYDROcodone-acetaminophen (NORCO) 5-325 MG per tablet, Take 1 tablet by mouth Every 6 (Six) Hours As Needed., Disp: , Rfl:     imipramine (TOFRANIL) 10 MG tablet, Take 1 tablet by mouth 4 (Four) Times a Day., Disp: , Rfl:      isosorbide mononitrate (IMDUR) 60 MG 24 hr tablet, TAKE ONE TABLET BY MOUTH DAILY, Disp: 30 tablet, Rfl: 5    nitroglycerin (NITROSTAT) 0.4 MG SL tablet, 1 under the tongue as needed for angina, may repeat q5mins for up three doses, Disp: 25 tablet, Rfl: 3    Omega-3 Fatty Acids (fish oil) 1000 MG capsule capsule, Take  by mouth Daily With Breakfast., Disp: , Rfl:     tiZANidine (ZANAFLEX) 4 MG tablet, Take 1 tablet by mouth At Night As Needed for Muscle Spasms., Disp: , Rfl:     Vericiguat (Verquvo) 10 MG tablet, Take 1 tablet by mouth Every Morning., Disp: 30 tablet, Rfl: 2    donepezil (ARICEPT) 5 MG tablet, Take 1 tablet by mouth Every Night., Disp: , Rfl:     memantine (NAMENDA) 5 MG tablet, Take 1 tablet by mouth 2 (Two) Times a Day., Disp: , Rfl:     The following portions of the patient's history were reviewed and updated as appropriate: allergies, current medications, past family history, past medical history, past social history, past surgical history and problem list.    Social History     Tobacco Use    Smoking status: Former     Packs/day: 2     Types: Cigarettes     Quit date:      Years since quittin.1    Smokeless tobacco: Never   Vaping Use    Vaping Use: Never used   Substance Use Topics    Alcohol use: No    Drug use: No     Review of Systems   Constitutional: Negative for chills and fever.   HENT:  Negative for nosebleeds and sore throat.    Respiratory:  Positive for shortness of breath. Negative for cough, hemoptysis and wheezing.    Musculoskeletal:  Positive for joint pain.   Gastrointestinal:  Negative for abdominal pain, hematemesis, hematochezia, melena, nausea and vomiting.   Genitourinary:  Negative for dysuria and hematuria.   Neurological:  Negative for headaches.                                        2023  /63 121/71 156/77   Heart Rate 83 88 97   Resp 16 18 --   SpO2 95 % 95 % 99 %   Weight  "51.3 kg (113 lb) 52.6 kg (116 lb) 53.1 kg (117 lb)   Height 149.9 cm (59\") 149.9 cm (59\") 149.9 cm (59\")   BMI (Calculated) 22.8 23.4 23.6     Objective   Vitals:    02/22/24 1045   BP: 116/63   Pulse: 83   Resp: 16   SpO2: 95%   Weight: 51.3 kg (113 lb)   Height: 149.9 cm (59\")     Body mass index is 22.82 kg/m².    Vitals reviewed.   Constitutional:       Appearance: Well-developed.   Eyes:      Conjunctiva/sclera: Conjunctivae normal.   HENT:      Head: Normocephalic.   Neck:      Thyroid: No thyromegaly.      Vascular: No JVD.      Trachea: No tracheal deviation.   Pulmonary:      Effort: No respiratory distress.      Breath sounds: Normal breath sounds. No wheezing. No rales.   Cardiovascular:      PMI at left midclavicular line. Normal rate. Regular rhythm. Normal S1. Normal S2.       Murmurs: There is no murmur.      No gallop.  No click. No rub.   Pulses:     Intact distal pulses.   Edema:     Peripheral edema absent.   Abdominal:      General: Bowel sounds are normal.      Palpations: Abdomen is soft. There is no abdominal mass.      Tenderness: There is no abdominal tenderness.   Musculoskeletal:      Cervical back: Normal range of motion and neck supple. Skin:     General: Skin is warm and dry.   Neurological:      Mental Status: Alert and oriented to person, place, and time.      Cranial Nerves: No cranial nerve deficit.       Lab Results   Component Value Date     05/04/2023    K 5.0 05/04/2023     05/04/2023    CO2 27.0 05/04/2023    BUN 51 (H) 05/04/2023    CREATININE 1.24 (H) 05/04/2023    GLUCOSE 82 05/04/2023    CALCIUM 9.5 05/04/2023    AST 24 05/04/2023    ALT 32 05/04/2023    ALKPHOS 100 05/04/2023    LABIL2 2.8 (H) 05/31/2019     No results found for: \"CKTOTAL\"  Lab Results   Component Value Date    WBC 6.08 05/31/2016    HGB 13.1 05/31/2016    HCT 38.6 05/31/2016     05/31/2016     Lab Results   Component Value Date    INR 1.00 05/31/2016       Assessment & Plan    Diagnosis " Plan   1. ASCVD (arteriosclerotic cardiovascular disease), s/p previous MI, s/p CABG in 1997, with stable angina.        2. Ischemic cardiomyopathy with an ejection fraction of about 30 to 35% with NYHA class III dyspnea.        3. Chronic systolic heart failure, appears compensated.        4. Preoperative cardiovascular examination for right shoulder surgery.        5. Stage 3a chronic kidney disease          Recommendations  I have discussed the results of the recent nuclear stress test and echo Doppler study with the patient.  Since her cardiac status appears to be relatively stable at this time, I told her will go ahead and clear her for the right shoulder surgery if this is going to benefit her significantly improving the quality of her life although there is moderate cardiac risk of perioperative cardiac event such as congestive heart failure due to her underlying advanced ischemic cardiomyopathy.  One has to be very careful about giving her too much fluids around the surgery to avoid acute decompensated heart failure.  Continue her cardiac medications perioperatively.  Continue with low-dose aspirin perioperatively if possible.  If not may hold it for 4 to 5 days prior to surgery and restart as soon as possible after the surgery.    Return in about 2 months (around 4/22/2024).    As always, Kreis, Samuel Duane, MD  I appreciate very much the opportunity to participate in the cardiovascular care of your patients. Please do not hesitate to call me with any questions with regards to Kerri LUO Kinney's evaluation and management.       With Best Regards,        Krish Ocasio MD, [facility]    Please note that portions of this note were completed with a voice recognition program.

## 2024-03-14 DIAGNOSIS — I50.22 CHRONIC SYSTOLIC CONGESTIVE HEART FAILURE: ICD-10-CM

## 2024-03-14 RX ORDER — SACUBITRIL AND VALSARTAN 24; 26 MG/1; MG/1
TABLET, FILM COATED ORAL
Qty: 180 TABLET | Refills: 0 | Status: SHIPPED | OUTPATIENT
Start: 2024-03-14

## 2024-03-14 RX ORDER — SPIRONOLACTONE 25 MG/1
TABLET ORAL
Qty: 30 TABLET | Refills: 3 | OUTPATIENT
Start: 2024-03-14

## 2024-05-20 RX ORDER — SPIRONOLACTONE 25 MG/1
TABLET ORAL
Qty: 30 TABLET | Refills: 3 | OUTPATIENT
Start: 2024-05-20

## 2024-07-24 ENCOUNTER — OFFICE VISIT (OUTPATIENT)
Dept: CARDIOLOGY | Facility: CLINIC | Age: 79
End: 2024-07-24
Payer: MEDICARE

## 2024-07-24 VITALS
OXYGEN SATURATION: 97 % | WEIGHT: 119.2 LBS | SYSTOLIC BLOOD PRESSURE: 130 MMHG | RESPIRATION RATE: 16 BRPM | HEIGHT: 59 IN | DIASTOLIC BLOOD PRESSURE: 71 MMHG | BODY MASS INDEX: 24.03 KG/M2 | HEART RATE: 69 BPM

## 2024-07-24 DIAGNOSIS — I25.5 ISCHEMIC CARDIOMYOPATHY: ICD-10-CM

## 2024-07-24 DIAGNOSIS — I50.22 CHRONIC SYSTOLIC HEART FAILURE: ICD-10-CM

## 2024-07-24 DIAGNOSIS — I25.10 ASCVD (ARTERIOSCLEROTIC CARDIOVASCULAR DISEASE): Primary | ICD-10-CM

## 2024-07-24 DIAGNOSIS — I50.22 CHRONIC SYSTOLIC CONGESTIVE HEART FAILURE: ICD-10-CM

## 2024-07-24 DIAGNOSIS — N18.31 STAGE 3A CHRONIC KIDNEY DISEASE: ICD-10-CM

## 2024-07-24 PROCEDURE — 93000 ELECTROCARDIOGRAM COMPLETE: CPT | Performed by: INTERNAL MEDICINE

## 2024-07-24 PROCEDURE — 99214 OFFICE O/P EST MOD 30 MIN: CPT | Performed by: INTERNAL MEDICINE

## 2024-07-24 PROCEDURE — 3078F DIAST BP <80 MM HG: CPT | Performed by: INTERNAL MEDICINE

## 2024-07-24 PROCEDURE — 3075F SYST BP GE 130 - 139MM HG: CPT | Performed by: INTERNAL MEDICINE

## 2024-07-24 RX ORDER — ISOSORBIDE MONONITRATE 60 MG/1
60 TABLET, EXTENDED RELEASE ORAL DAILY
Qty: 30 TABLET | Refills: 5 | Status: SHIPPED | OUTPATIENT
Start: 2024-07-24

## 2024-07-24 RX ORDER — DAPAGLIFLOZIN 5 MG/1
5 TABLET, FILM COATED ORAL DAILY
Qty: 30 TABLET | Refills: 5 | Status: SHIPPED | OUTPATIENT
Start: 2024-07-24

## 2024-07-24 RX ORDER — SACUBITRIL AND VALSARTAN 24; 26 MG/1; MG/1
1 TABLET, FILM COATED ORAL 2 TIMES DAILY
Qty: 180 TABLET | Refills: 0 | Status: SHIPPED | OUTPATIENT
Start: 2024-07-24

## 2024-07-24 RX ORDER — VERICIGUAT 10 MG/1
10 TABLET, FILM COATED ORAL EVERY MORNING
Qty: 30 TABLET | Refills: 2 | Status: SHIPPED | OUTPATIENT
Start: 2024-07-24

## 2024-07-24 RX ORDER — CARVEDILOL 6.25 MG/1
3.12 TABLET ORAL 2 TIMES DAILY WITH MEALS
Qty: 60 TABLET | Refills: 3 | Status: SHIPPED | COMMUNITY
Start: 2024-07-24 | End: 2024-07-24

## 2024-07-24 RX ORDER — CLONAZEPAM 0.5 MG/1
0.5 TABLET ORAL 2 TIMES DAILY PRN
COMMUNITY

## 2024-07-24 RX ORDER — CARVEDILOL 6.25 MG/1
6.25 TABLET ORAL 2 TIMES DAILY WITH MEALS
Qty: 60 TABLET | Refills: 5 | Status: SHIPPED | OUTPATIENT
Start: 2024-07-24

## 2024-07-24 RX ORDER — CARVEDILOL 6.25 MG/1
6.25 TABLET ORAL 2 TIMES DAILY WITH MEALS
Status: SHIPPED | COMMUNITY
Start: 2024-07-24 | End: 2024-07-24 | Stop reason: SDUPTHER

## 2024-07-24 NOTE — LETTER
July 24, 2024     Samuel Duane Kreis, MD  [address] [place] [address] [place] [address]    Patient: Kerri Kinney   YOB: 1945   Date of Visit: 7/24/2024     Dear Samuel Duane Kreis, MD:       Thank you for referring Kerri Kinney to me for evaluation. Below are the relevant portions of my assessment and plan of care.    If you have questions, please do not hesitate to call me. I look forward to following Kerri along with you.         Sincerely,        Krish Ocasio MD        CC: No Recipients    Krish Ocasio MD  07/24/24 1909  Sign when Signing Visit  Kreis, Samuel Duane, MD  Kerri Kinney  1945 07/24/2024    Patient Active Problem List   Diagnosis   •  ASCVD, S/P CABG 1997   • CKD (chronic kidney disease) (stage III)   •  S/P ICD with recent gen change 2009   • Preoperative cardiovascular examination   • History of chronic class II to III congestive heart failure   • Coronary artery disease   • Hypertension   • Depression   • Anxiety   • Hyperlipidemia   • GERD (gastroesophageal reflux disease)   • Dizziness   • Ischemic cardiomyopathy with LV ejection fraction of 30-35% in feb 2018 with NYHA class 2-3 dyspnea.   • Precordial pain   • Presence of biventricular implantable cardioverter-defibrillator (ICD)   • Chronic systolic heart failure, appears compensated.       Dear Kreis, Samuel Duane, MD:    Subjective    Kerri Kinney is a 79 y.o. female with the problems as listed above, presents    Chief complaint: Follow-up of coronary artery disease and ischemic cardiomyopathy.    History of Present Illness: Ms. Jennifer Kinney is a very pleasant 79-year-old  female with history of known significant coronary artery disease with previous myocardial infarction's involving the anteroseptal and apical myocardial segments, s/p CABG 1997 with underlying advanced ischemic cardiomyopathy with LV ejection fraction of about 31 to 35%.  She is here today as a regular cardiology follow-up.  On  today's visit she denies any complaints of chest pains or significant shortness of breath.  She does have chronic dyspnea with moderate exertion which has been stable.  She denies any PND, orthopnea or leg edema.  Overall she says she is feeling pretty good.    Allergies   Allergen Reactions   • Codeine    • Demerol [Meperidine]    • Penicillins    • Terramycin [Oxytetracycline]    :    Current Outpatient Medications:   •  acetaminophen (TYLENOL) 325 MG tablet, Take 1 tablet by mouth Every 4 (Four) Hours As Needed for Fever., Disp: , Rfl:   •  allopurinol (ZYLOPRIM) 100 MG tablet, Take 1 tablet by mouth Daily., Disp: , Rfl:   •  atorvastatin (LIPITOR) 40 MG tablet, Take 1 tablet by mouth Daily., Disp: , Rfl:   •  bumetanide (BUMEX) 1 MG tablet, Take 1 tablet by mouth Daily. Take 2 tablets daily for 2 days and then 1 tablet daily after that., Disp: 30 tablet, Rfl: 2  •  carvedilol (COREG) 6.25 MG tablet, Take 1 tablet by mouth 2 (Two) Times a Day With Meals., Disp: 60 tablet, Rfl: 5  •  clonazePAM (KlonoPIN) 0.5 MG tablet, Take 1 tablet by mouth 2 (Two) Times a Day As Needed., Disp: , Rfl:   •  dapagliflozin (Farxiga) 5 MG tablet tablet, Take 1 tablet by mouth Daily., Disp: 30 tablet, Rfl: 5  •  dexlansoprazole (DEXILANT) 60 MG capsule, Take 1 capsule by mouth Daily., Disp: , Rfl:   •  diclofenac (VOLTAREN) 1 % gel gel, Apply 4 g topically 4 (Four) Times a Day As Needed., Disp: , Rfl:   •  donepezil (ARICEPT) 5 MG tablet, Take 1 tablet by mouth Every Night., Disp: , Rfl:   •  Garlic 1000 MG capsule, Take 1 capsule by mouth Daily., Disp: , Rfl:   •  HYDROcodone-acetaminophen (NORCO) 5-325 MG per tablet, Take 1 tablet by mouth Every 6 (Six) Hours As Needed., Disp: , Rfl:   •  imipramine (TOFRANIL) 10 MG tablet, Take 1 tablet by mouth 4 (Four) Times a Day., Disp: , Rfl:   •  isosorbide mononitrate (IMDUR) 60 MG 24 hr tablet, Take 1 tablet by mouth Daily., Disp: 30 tablet, Rfl: 5  •  nitroglycerin (NITROSTAT) 0.4 MG SL  "tablet, 1 under the tongue as needed for angina, may repeat q5mins for up three doses, Disp: 25 tablet, Rfl: 3  •  Omega-3 Fatty Acids (fish oil) 1000 MG capsule capsule, Take  by mouth Daily With Breakfast., Disp: , Rfl:   •  sacubitril-valsartan (Entresto) 24-26 MG tablet, Take 1 tablet by mouth 2 (Two) Times a Day., Disp: 180 tablet, Rfl: 0  •  tiZANidine (ZANAFLEX) 4 MG tablet, Take 1 tablet by mouth At Night As Needed for Muscle Spasms., Disp: , Rfl:   •  Vericiguat (Verquvo) 10 MG tablet, Take 1 tablet by mouth Every Morning., Disp: 30 tablet, Rfl: 2  •  aspirin 81 MG EC tablet, Take 1 tablet by mouth Daily., Disp: , Rfl:   •  memantine (NAMENDA) 5 MG tablet, Take 1 tablet by mouth 2 (Two) Times a Day., Disp: , Rfl:     The following portions of the patient's history were reviewed and updated as appropriate: allergies, current medications, past family history, past medical history, past social history, past surgical history and problem list.    Social History     Tobacco Use   • Smoking status: Former     Current packs/day: 0.00     Types: Cigarettes     Quit date:      Years since quittin.5   • Smokeless tobacco: Never   Vaping Use   • Vaping status: Never Used   Substance Use Topics   • Alcohol use: No   • Drug use: No     Review of Systems   Constitutional: Negative for chills and fever.   HENT:  Negative for nosebleeds and sore throat.    Respiratory:  Positive for shortness of breath. Negative for cough, hemoptysis and wheezing.    Gastrointestinal:  Negative for abdominal pain, hematemesis, hematochezia, melena, nausea and vomiting.   Genitourinary:  Negative for dysuria and hematuria.   Neurological:  Negative for headaches.     Objective  Vitals:    24 1440   BP: 130/71   Pulse: 69   Resp: 16   SpO2: 97%   Weight: 54.1 kg (119 lb 3.2 oz)   Height: 149.9 cm (59\")     Body mass index is 24.08 kg/m².    Vitals reviewed.   Constitutional:       Appearance: Well-developed.   Eyes:      " "Conjunctiva/sclera: Conjunctivae normal.   HENT:      Head: Normocephalic.   Neck:      Thyroid: No thyromegaly.      Vascular: No JVD.      Trachea: No tracheal deviation.   Pulmonary:      Effort: No respiratory distress.      Breath sounds: Normal breath sounds. No wheezing. No rales.   Cardiovascular:      PMI at left midclavicular line. Normal rate. Regular rhythm. Normal S1. Normal S2.       Murmurs: There is a grade 3/6 high frequency blowing holosystolic murmur at the apex.      No gallop.  No click. No rub.   Pulses:     Intact distal pulses.   Edema:     Peripheral edema absent.   Abdominal:      General: Bowel sounds are normal.      Palpations: Abdomen is soft. There is no abdominal mass.      Tenderness: There is no abdominal tenderness.   Musculoskeletal:      Cervical back: Normal range of motion and neck supple. Skin:     General: Skin is warm and dry.   Neurological:      Mental Status: Alert and oriented to person, place, and time.      Cranial Nerves: No cranial nerve deficit.         Lab Results   Component Value Date     05/04/2023    K 5.0 05/04/2023     05/04/2023    CO2 27.0 05/04/2023    BUN 51 (H) 05/04/2023    CREATININE 1.24 (H) 05/04/2023    GLUCOSE 82 05/04/2023    CALCIUM 9.5 05/04/2023    AST 24 05/04/2023    ALT 32 05/04/2023    ALKPHOS 100 05/04/2023    LABIL2 2.8 (H) 05/31/2019     No results found for: \"CKTOTAL\"  Lab Results   Component Value Date    WBC 6.08 05/31/2016    HGB 13.1 05/31/2016    HCT 38.6 05/31/2016     05/31/2016     Lab Results   Component Value Date    INR 1.00 05/31/2016         ECG 12 Lead    Date/Time: 7/24/2024 2:42 PM  Performed by: Krish Ocasio MD    Authorized by: Krish Ocasio MD  Comparison: compared with previous ECG from 7/20/2023  Similar to previous ECG  Other findings: non-specific ST-T wave changes  Comments: Biventricular paced rhythm            Assessment & Plan   Diagnosis Plan   1. ASCVD (arteriosclerotic " cardiovascular disease), s/p previous MI, s/p CABG in 1997, with stable angina.        2. Ischemic cardiomyopathy with an ejection fraction of about 30 to 35% with NYHA class III dyspnea.        3. Chronic systolic heart failure, appears compensated.        4. Stage 3a chronic kidney disease  Basic Metabolic Panel      5. Chronic systolic congestive heart failure  sacubitril-valsartan (Entresto) 24-26 MG tablet        Recommendations  Will increase the dose of carvedilol to 6.25 mg p.o. twice daily.  Continue with Entresto and Farxiga  Will consider adding spironolactone if her potassium levels and kidney function are stable    Return in about 3 months (around 10/24/2024).    As always, Kreis, Samuel Duane, MD  I appreciate very much the opportunity to participate in the cardiovascular care of your patients. Please do not hesitate to call me with any questions with regards to Kerri Kinney's evaluation and management.       With Best Regards,        Krish Ocasio MD, [facility]    Please note that portions of this note were completed with a voice recognition program.

## 2024-07-24 NOTE — PROGRESS NOTES
Kreis, Samuel Duane, MD  Kerri Kinney  1945 07/24/2024    Patient Active Problem List   Diagnosis     ASCVD, S/P CABG 1997    CKD (chronic kidney disease) (stage III)     S/P ICD with recent gen change 2009    Preoperative cardiovascular examination    History of chronic class II to III congestive heart failure    Coronary artery disease    Hypertension    Depression    Anxiety    Hyperlipidemia    GERD (gastroesophageal reflux disease)    Dizziness    Ischemic cardiomyopathy with LV ejection fraction of 30-35% in feb 2018 with NYHA class 2-3 dyspnea.    Precordial pain    Presence of biventricular implantable cardioverter-defibrillator (ICD)    Chronic systolic heart failure, appears compensated.       Dear Kreis, Samuel Duane, MD:    Subjective     Kerri Kinney is a 79 y.o. female with the problems as listed above, presents    Chief complaint: Follow-up of coronary artery disease and ischemic cardiomyopathy.    History of Present Illness: Ms. Jennifer Kinney is a very pleasant 79-year-old  female with history of known significant coronary artery disease with previous myocardial infarction's involving the anteroseptal and apical myocardial segments, s/p CABG 1997 with underlying advanced ischemic cardiomyopathy with LV ejection fraction of about 31 to 35%.  She is here today as a regular cardiology follow-up.  On today's visit she denies any complaints of chest pains or significant shortness of breath.  She does have chronic dyspnea with moderate exertion which has been stable.  She denies any PND, orthopnea or leg edema.  Overall she says she is feeling pretty good.    Allergies   Allergen Reactions    Codeine     Demerol [Meperidine]     Penicillins     Terramycin [Oxytetracycline]    :    Current Outpatient Medications:     acetaminophen (TYLENOL) 325 MG tablet, Take 1 tablet by mouth Every 4 (Four) Hours As Needed for Fever., Disp: , Rfl:     allopurinol (ZYLOPRIM) 100 MG tablet, Take 1  tablet by mouth Daily., Disp: , Rfl:     atorvastatin (LIPITOR) 40 MG tablet, Take 1 tablet by mouth Daily., Disp: , Rfl:     bumetanide (BUMEX) 1 MG tablet, Take 1 tablet by mouth Daily. Take 2 tablets daily for 2 days and then 1 tablet daily after that., Disp: 30 tablet, Rfl: 2    carvedilol (COREG) 6.25 MG tablet, Take 1 tablet by mouth 2 (Two) Times a Day With Meals., Disp: 60 tablet, Rfl: 5    clonazePAM (KlonoPIN) 0.5 MG tablet, Take 1 tablet by mouth 2 (Two) Times a Day As Needed., Disp: , Rfl:     dapagliflozin (Farxiga) 5 MG tablet tablet, Take 1 tablet by mouth Daily., Disp: 30 tablet, Rfl: 5    dexlansoprazole (DEXILANT) 60 MG capsule, Take 1 capsule by mouth Daily., Disp: , Rfl:     diclofenac (VOLTAREN) 1 % gel gel, Apply 4 g topically 4 (Four) Times a Day As Needed., Disp: , Rfl:     donepezil (ARICEPT) 5 MG tablet, Take 1 tablet by mouth Every Night., Disp: , Rfl:     Garlic 1000 MG capsule, Take 1 capsule by mouth Daily., Disp: , Rfl:     HYDROcodone-acetaminophen (NORCO) 5-325 MG per tablet, Take 1 tablet by mouth Every 6 (Six) Hours As Needed., Disp: , Rfl:     imipramine (TOFRANIL) 10 MG tablet, Take 1 tablet by mouth 4 (Four) Times a Day., Disp: , Rfl:     isosorbide mononitrate (IMDUR) 60 MG 24 hr tablet, Take 1 tablet by mouth Daily., Disp: 30 tablet, Rfl: 5    nitroglycerin (NITROSTAT) 0.4 MG SL tablet, 1 under the tongue as needed for angina, may repeat q5mins for up three doses, Disp: 25 tablet, Rfl: 3    Omega-3 Fatty Acids (fish oil) 1000 MG capsule capsule, Take  by mouth Daily With Breakfast., Disp: , Rfl:     sacubitril-valsartan (Entresto) 24-26 MG tablet, Take 1 tablet by mouth 2 (Two) Times a Day., Disp: 180 tablet, Rfl: 0    tiZANidine (ZANAFLEX) 4 MG tablet, Take 1 tablet by mouth At Night As Needed for Muscle Spasms., Disp: , Rfl:     Vericiguat (Verquvo) 10 MG tablet, Take 1 tablet by mouth Every Morning., Disp: 30 tablet, Rfl: 2    aspirin 81 MG EC tablet, Take 1 tablet by mouth  "Daily., Disp: , Rfl:     memantine (NAMENDA) 5 MG tablet, Take 1 tablet by mouth 2 (Two) Times a Day., Disp: , Rfl:     The following portions of the patient's history were reviewed and updated as appropriate: allergies, current medications, past family history, past medical history, past social history, past surgical history and problem list.    Social History     Tobacco Use    Smoking status: Former     Current packs/day: 0.00     Types: Cigarettes     Quit date:      Years since quittin.5    Smokeless tobacco: Never   Vaping Use    Vaping status: Never Used   Substance Use Topics    Alcohol use: No    Drug use: No     Review of Systems   Constitutional: Negative for chills and fever.   HENT:  Negative for nosebleeds and sore throat.    Respiratory:  Positive for shortness of breath. Negative for cough, hemoptysis and wheezing.    Gastrointestinal:  Negative for abdominal pain, hematemesis, hematochezia, melena, nausea and vomiting.   Genitourinary:  Negative for dysuria and hematuria.   Neurological:  Negative for headaches.     Objective   Vitals:    24 1440   BP: 130/71   Pulse: 69   Resp: 16   SpO2: 97%   Weight: 54.1 kg (119 lb 3.2 oz)   Height: 149.9 cm (59\")     Body mass index is 24.08 kg/m².    Vitals reviewed.   Constitutional:       Appearance: Well-developed.   Eyes:      Conjunctiva/sclera: Conjunctivae normal.   HENT:      Head: Normocephalic.   Neck:      Thyroid: No thyromegaly.      Vascular: No JVD.      Trachea: No tracheal deviation.   Pulmonary:      Effort: No respiratory distress.      Breath sounds: Normal breath sounds. No wheezing. No rales.   Cardiovascular:      PMI at left midclavicular line. Normal rate. Regular rhythm. Normal S1. Normal S2.       Murmurs: There is a grade 3/6 high frequency blowing holosystolic murmur at the apex.      No gallop.  No click. No rub.   Pulses:     Intact distal pulses.   Edema:     Peripheral edema absent.   Abdominal:      General: " "Bowel sounds are normal.      Palpations: Abdomen is soft. There is no abdominal mass.      Tenderness: There is no abdominal tenderness.   Musculoskeletal:      Cervical back: Normal range of motion and neck supple. Skin:     General: Skin is warm and dry.   Neurological:      Mental Status: Alert and oriented to person, place, and time.      Cranial Nerves: No cranial nerve deficit.         Lab Results   Component Value Date     05/04/2023    K 5.0 05/04/2023     05/04/2023    CO2 27.0 05/04/2023    BUN 51 (H) 05/04/2023    CREATININE 1.24 (H) 05/04/2023    GLUCOSE 82 05/04/2023    CALCIUM 9.5 05/04/2023    AST 24 05/04/2023    ALT 32 05/04/2023    ALKPHOS 100 05/04/2023    LABIL2 2.8 (H) 05/31/2019     No results found for: \"CKTOTAL\"  Lab Results   Component Value Date    WBC 6.08 05/31/2016    HGB 13.1 05/31/2016    HCT 38.6 05/31/2016     05/31/2016     Lab Results   Component Value Date    INR 1.00 05/31/2016         ECG 12 Lead    Date/Time: 7/24/2024 2:42 PM  Performed by: Krish Ocasio MD    Authorized by: Krish Ocasio MD  Comparison: compared with previous ECG from 7/20/2023  Similar to previous ECG  Other findings: non-specific ST-T wave changes  Comments: Biventricular paced rhythm            Assessment & Plan    Diagnosis Plan   1. ASCVD (arteriosclerotic cardiovascular disease), s/p previous MI, s/p CABG in 1997, with stable angina.        2. Ischemic cardiomyopathy with an ejection fraction of about 30 to 35% with NYHA class III dyspnea.        3. Chronic systolic heart failure, appears compensated.        4. Stage 3a chronic kidney disease  Basic Metabolic Panel      5. Chronic systolic congestive heart failure  sacubitril-valsartan (Entresto) 24-26 MG tablet        Recommendations  Will increase the dose of carvedilol to 6.25 mg p.o. twice daily.  Continue with Entresto and Farxiga  Will consider adding spironolactone if her potassium levels and kidney function are " stable    Return in about 3 months (around 10/24/2024).    As always, Kreis, Samuel Duane, MD  I appreciate very much the opportunity to participate in the cardiovascular care of your patients. Please do not hesitate to call me with any questions with regards to Kerri Kinney's evaluation and management.       With Best Regards,        Krish Ocasio MD, [facility]    Please note that portions of this note were completed with a voice recognition program.

## 2024-08-27 ENCOUNTER — PRIOR AUTHORIZATION (OUTPATIENT)
Dept: CARDIOLOGY | Facility: CLINIC | Age: 79
End: 2024-08-27
Payer: MEDICARE

## 2024-09-24 ENCOUNTER — TELEPHONE (OUTPATIENT)
Dept: CARDIOLOGY | Facility: CLINIC | Age: 79
End: 2024-09-24
Payer: MEDICARE

## 2024-11-15 RX ORDER — VERICIGUAT 10 MG/1
10 TABLET, FILM COATED ORAL EVERY MORNING
Qty: 30 TABLET | Refills: 2 | Status: SHIPPED | OUTPATIENT
Start: 2024-11-15 | End: 2024-11-18 | Stop reason: SDUPTHER

## 2024-11-18 ENCOUNTER — OFFICE VISIT (OUTPATIENT)
Dept: CARDIOLOGY | Facility: CLINIC | Age: 79
End: 2024-11-18
Payer: MEDICARE

## 2024-11-18 VITALS
WEIGHT: 125.8 LBS | BODY MASS INDEX: 25.36 KG/M2 | SYSTOLIC BLOOD PRESSURE: 130 MMHG | OXYGEN SATURATION: 97 % | DIASTOLIC BLOOD PRESSURE: 65 MMHG | HEART RATE: 65 BPM | HEIGHT: 59 IN

## 2024-11-18 DIAGNOSIS — Z95.810 ICD (IMPLANTABLE CARDIOVERTER-DEFIBRILLATOR), DUAL, IN SITU: ICD-10-CM

## 2024-11-18 DIAGNOSIS — I25.5 ISCHEMIC CARDIOMYOPATHY: ICD-10-CM

## 2024-11-18 DIAGNOSIS — I50.22 CHRONIC SYSTOLIC CONGESTIVE HEART FAILURE: ICD-10-CM

## 2024-11-18 DIAGNOSIS — I25.10 ASCVD (ARTERIOSCLEROTIC CARDIOVASCULAR DISEASE): Primary | ICD-10-CM

## 2024-11-18 DIAGNOSIS — I50.22 CHRONIC SYSTOLIC HEART FAILURE: ICD-10-CM

## 2024-11-18 PROCEDURE — 3075F SYST BP GE 130 - 139MM HG: CPT | Performed by: INTERNAL MEDICINE

## 2024-11-18 PROCEDURE — 99213 OFFICE O/P EST LOW 20 MIN: CPT | Performed by: INTERNAL MEDICINE

## 2024-11-18 PROCEDURE — 3078F DIAST BP <80 MM HG: CPT | Performed by: INTERNAL MEDICINE

## 2024-11-18 RX ORDER — ATORVASTATIN CALCIUM 40 MG/1
40 TABLET, FILM COATED ORAL DAILY
Qty: 90 TABLET | Refills: 2 | Status: SHIPPED | OUTPATIENT
Start: 2024-11-18

## 2024-11-18 RX ORDER — CARVEDILOL 6.25 MG/1
12.5 TABLET ORAL 2 TIMES DAILY WITH MEALS
Qty: 120 TABLET | Refills: 5 | Status: SHIPPED | OUTPATIENT
Start: 2024-11-18

## 2024-11-18 RX ORDER — DAPAGLIFLOZIN 5 MG/1
5 TABLET, FILM COATED ORAL DAILY
Qty: 90 TABLET | Refills: 2 | Status: SHIPPED | OUTPATIENT
Start: 2024-11-18

## 2024-11-18 RX ORDER — SACUBITRIL AND VALSARTAN 24; 26 MG/1; MG/1
1 TABLET, FILM COATED ORAL 2 TIMES DAILY
Qty: 180 TABLET | Refills: 0 | Status: SHIPPED | OUTPATIENT
Start: 2024-11-18

## 2024-11-18 RX ORDER — CARVEDILOL 6.25 MG/1
12.5 TABLET ORAL 2 TIMES DAILY WITH MEALS
Qty: 120 TABLET | Refills: 5 | Status: SHIPPED | OUTPATIENT
Start: 2024-11-18 | End: 2024-11-18 | Stop reason: SDUPTHER

## 2024-11-18 RX ORDER — VERICIGUAT 10 MG/1
10 TABLET, FILM COATED ORAL EVERY MORNING
Qty: 90 TABLET | Refills: 2 | Status: SHIPPED | OUTPATIENT
Start: 2024-11-18

## 2024-11-18 NOTE — LETTER
November 18, 2024     Samuel Duane Kreis, MD  [address] [place] [address] [place] [address]    Patient: Kerri Kinney   YOB: 1945   Date of Visit: 11/18/2024     Dear Samuel Duane Kreis, MD:       Thank you for referring Kerri Kinney to me for evaluation. Below are the relevant portions of my assessment and plan of care.    If you have questions, please do not hesitate to call me. I look forward to following Kerri along with you.         Sincerely,        Krish Ocasio MD        CC: No Recipients    Krish Ocasio MD  11/18/24 1612  Sign when Signing Visit  Kreis, Samuel Duane, MD  Kerri Kinney  1945 11/18/2024    Patient Active Problem List   Diagnosis   •  ASCVD, S/P CABG 1997   • CKD (chronic kidney disease) (stage III)   •  S/P ICD with recent gen change 2009   • Preoperative cardiovascular examination   • History of chronic class II to III congestive heart failure   • Coronary artery disease   • Hypertension   • Depression   • Anxiety   • Hyperlipidemia   • GERD (gastroesophageal reflux disease)   • Dizziness   • Ischemic cardiomyopathy with LV ejection fraction of 30-35% in feb 2018 with NYHA class 2-3 dyspnea.   • Precordial pain   • Presence of biventricular implantable cardioverter-defibrillator (ICD)   • Chronic systolic heart failure, appears compensated.       Dear Dr. Torres:    Subjective    Kerri Kinney is a 79 y.o. female with the problems as listed above, presents    Chief complaint: Follow-up of coronary artery disease and ischemic cardiomyopathy.    History of Present Illness: Ms. Kerri Kinney is a very pleasant 79-year-old  female with history of known significant CAD with previous myocardial infarction's involving the anteroseptal and apical myocardial segments, s/p CABG 1997 with underlying advanced ischemic cardiomyopathy with LV ejection fraction of about 31 to 35%.  She presents today as a regular cardiology follow-up.  On today's visit she states her  breathing is overall okay although some days she gets more short of breath than usual.  She did gain about 6 pounds since 7/24/2024.  She denies any history suggestive of PND, orthopnea or pedal edema.  She has occasional chest pains.  She states that she has not taken her medications this morning as she was in a hurry and hence her blood pressure is little bit higher than usual.    Allergies   Allergen Reactions   • Codeine    • Demerol [Meperidine]    • Penicillins    • Terramycin [Oxytetracycline]    :    Current Outpatient Medications:   •  acetaminophen (TYLENOL) 325 MG tablet, Take 1 tablet by mouth Every 4 (Four) Hours As Needed for Fever., Disp: , Rfl:   •  allopurinol (ZYLOPRIM) 100 MG tablet, Take 1 tablet by mouth Daily., Disp: , Rfl:   •  aspirin 81 MG EC tablet, Take 1 tablet by mouth Daily., Disp: , Rfl:   •  atorvastatin (LIPITOR) 40 MG tablet, Take 1 tablet by mouth Daily., Disp: 90 tablet, Rfl: 2  •  bumetanide (BUMEX) 1 MG tablet, Take 1 tablet by mouth Daily. Take 2 tablets daily for 2 days and then 1 tablet daily after that., Disp: 30 tablet, Rfl: 2  •  carvedilol (COREG) 6.25 MG tablet, Take 2 tablets by mouth 2 (Two) Times a Day With Meals., Disp: 120 tablet, Rfl: 5  •  clonazePAM (KlonoPIN) 0.5 MG tablet, Take 1 tablet by mouth 2 (Two) Times a Day As Needed., Disp: , Rfl:   •  dapagliflozin (Farxiga) 5 MG tablet tablet, Take 1 tablet by mouth Daily., Disp: 90 tablet, Rfl: 2  •  dexlansoprazole (DEXILANT) 60 MG capsule, Take 1 capsule by mouth Daily., Disp: , Rfl:   •  diclofenac (VOLTAREN) 1 % gel gel, Apply 4 g topically 4 (Four) Times a Day As Needed., Disp: , Rfl:   •  donepezil (ARICEPT) 5 MG tablet, Take 1 tablet by mouth Every Night., Disp: , Rfl:   •  Garlic 1000 MG capsule, Take 1 capsule by mouth Daily., Disp: , Rfl:   •  HYDROcodone-acetaminophen (NORCO) 5-325 MG per tablet, Take 1 tablet by mouth Every 6 (Six) Hours As Needed., Disp: , Rfl:   •  imipramine (TOFRANIL) 10 MG tablet,  Take 1 tablet by mouth 4 (Four) Times a Day., Disp: , Rfl:   •  isosorbide mononitrate (IMDUR) 60 MG 24 hr tablet, Take 1 tablet by mouth Daily., Disp: 30 tablet, Rfl: 5  •  memantine (NAMENDA) 5 MG tablet, Take 1 tablet by mouth 2 (Two) Times a Day., Disp: , Rfl:   •  nitroglycerin (NITROSTAT) 0.4 MG SL tablet, 1 under the tongue as needed for angina, may repeat q5mins for up three doses, Disp: 25 tablet, Rfl: 3  •  Omega-3 Fatty Acids (fish oil) 1000 MG capsule capsule, Take  by mouth Daily With Breakfast., Disp: , Rfl:   •  sacubitril-valsartan (Entresto) 24-26 MG tablet, Take 1 tablet by mouth 2 (Two) Times a Day., Disp: 180 tablet, Rfl: 0  •  tiZANidine (ZANAFLEX) 4 MG tablet, Take 1 tablet by mouth At Night As Needed for Muscle Spasms., Disp: , Rfl:   •  Vericiguat (Verquvo) 10 MG tablet, Take 1 tablet by mouth Every Morning., Disp: 90 tablet, Rfl: 2    The following portions of the patient's history were reviewed and updated as appropriate: allergies, current medications, past family history, past medical history, past social history, past surgical history and problem list.    Social History     Tobacco Use   • Smoking status: Former     Current packs/day: 0.00     Types: Cigarettes     Quit date:      Years since quittin.8   • Smokeless tobacco: Never   Vaping Use   • Vaping status: Never Used   Substance Use Topics   • Alcohol use: No   • Drug use: No     Review of Systems   Constitutional: Negative for chills and fever.   HENT:  Negative for nosebleeds and sore throat.    Cardiovascular:  Positive for dyspnea on exertion.   Respiratory:  Negative for cough, hemoptysis and wheezing.    Gastrointestinal:  Negative for abdominal pain, hematemesis, hematochezia, melena, nausea and vomiting.   Genitourinary:  Negative for dysuria and hematuria.   Neurological:  Negative for headaches.     Objective  Vitals:    24 1328   BP: 130/65   Pulse: 65   SpO2: 97%   Weight: 57.1 kg (125 lb 12.8 oz)  "  Height: 149.9 cm (59\")     Body mass index is 25.41 kg/m².    Vitals reviewed.   Cardiovascular:      PMI at left midclavicular line. Normal rate. Regular rhythm. Normal S1. Normal S2.       Murmurs: There is a grade 3/6 high frequency blowing holosystolic murmur at the apex.      No gallop.  No click. No rub.   Pulses:     Intact distal pulses.   Edema:     Peripheral edema absent.         Lab Results   Component Value Date     05/04/2023    K 5.0 05/04/2023     05/04/2023    CO2 27.0 05/04/2023    BUN 51 (H) 05/04/2023    CREATININE 1.24 (H) 05/04/2023    GLUCOSE 82 05/04/2023    CALCIUM 9.5 05/04/2023    AST 24 05/04/2023    ALT 32 05/04/2023    ALKPHOS 100 05/04/2023    LABIL2 2.8 (H) 05/31/2019     No results found for: \"CKTOTAL\"  Lab Results   Component Value Date    WBC 6.08 05/31/2016    HGB 13.1 05/31/2016    HCT 38.6 05/31/2016     05/31/2016     Lab Results   Component Value Date    INR 1.00 05/31/2016           Assessment & Plan   Diagnosis Plan   1. ASCVD (arteriosclerotic cardiovascular disease), s/p previous MI, s/p CABG in 1997, with stable angina.        2. Ischemic cardiomyopathy with an ejection fraction of about 30 to 35% with NYHA class III dyspnea, appears compensated.        3. Chronic systolic heart failure, appears compensated.           4. ICD (implantable cardioverter-defibrillator), dual, in situ with no ICD discharges         Recommendations:  For her ischemic cardiomyopathy, will continue with Entresto, Farxiga and increase the dose of carvedilol to 12.5 mg p.o. twice daily.  For her coronary artery disease, continue with aspirin, atorvastatin and carvedilol.  I have asked her to be careful about salt intake especially around the holidays as she has had some recent weight gain.    Return in about 3 months (around 2/18/2025).    As always, Dr. Torres I appreciate very much the opportunity to participate in the cardiovascular care of your patients. Please do not " hesitate to call me with any questions with regards to Kerri Kinney's evaluation and management.       With Best Regards,        Krish Ocasio MD, FACC    Please note that portions of this note were completed with a voice recognition program.

## 2024-11-18 NOTE — PROGRESS NOTES
Kreis, Samuel Duane, MD  Kerri Kinney  1945 11/18/2024    Patient Active Problem List   Diagnosis     ASCVD, S/P CABG 1997    CKD (chronic kidney disease) (stage III)     S/P ICD with recent gen change 2009    Preoperative cardiovascular examination    History of chronic class II to III congestive heart failure    Coronary artery disease    Hypertension    Depression    Anxiety    Hyperlipidemia    GERD (gastroesophageal reflux disease)    Dizziness    Ischemic cardiomyopathy with LV ejection fraction of 30-35% in feb 2018 with NYHA class 2-3 dyspnea.    Precordial pain    Presence of biventricular implantable cardioverter-defibrillator (ICD)    Chronic systolic heart failure, appears compensated.       Dear Kreis, Samuel Duane, MD:    Subjective     Kerri Kinney is a 79 y.o. female with the problems as listed above, presents    Chief complaint: Follow-up of coronary artery disease and ischemic cardiomyopathy.    History of Present Illness: Ms. Kerri Kinney is a very pleasant 79-year-old  female with history of known significant CAD with previous myocardial infarction's involving the anteroseptal and apical myocardial segments, s/p CABG 1997 with underlying advanced ischemic cardiomyopathy with LV ejection fraction of about 31 to 35%.  She presents today as a regular cardiology follow-up.  On today's visit she states her breathing is overall okay although some days she gets more short of breath than usual.  She did gain about 6 pounds since 7/24/2024.  She denies any history suggestive of PND, orthopnea or pedal edema.  She has occasional chest pains.  She states that she has not taken her medications this morning as she was in a hurry and hence her blood pressure is little bit higher than usual.    Allergies   Allergen Reactions    Codeine     Demerol [Meperidine]     Penicillins     Terramycin [Oxytetracycline]    :    Current Outpatient Medications:     acetaminophen (TYLENOL) 325 MG tablet,  Take 1 tablet by mouth Every 4 (Four) Hours As Needed for Fever., Disp: , Rfl:     allopurinol (ZYLOPRIM) 100 MG tablet, Take 1 tablet by mouth Daily., Disp: , Rfl:     aspirin 81 MG EC tablet, Take 1 tablet by mouth Daily., Disp: , Rfl:     atorvastatin (LIPITOR) 40 MG tablet, Take 1 tablet by mouth Daily., Disp: 90 tablet, Rfl: 2    bumetanide (BUMEX) 1 MG tablet, Take 1 tablet by mouth Daily. Take 2 tablets daily for 2 days and then 1 tablet daily after that., Disp: 30 tablet, Rfl: 2    carvedilol (COREG) 6.25 MG tablet, Take 2 tablets by mouth 2 (Two) Times a Day With Meals., Disp: 120 tablet, Rfl: 5    clonazePAM (KlonoPIN) 0.5 MG tablet, Take 1 tablet by mouth 2 (Two) Times a Day As Needed., Disp: , Rfl:     dapagliflozin (Farxiga) 5 MG tablet tablet, Take 1 tablet by mouth Daily., Disp: 90 tablet, Rfl: 2    dexlansoprazole (DEXILANT) 60 MG capsule, Take 1 capsule by mouth Daily., Disp: , Rfl:     diclofenac (VOLTAREN) 1 % gel gel, Apply 4 g topically 4 (Four) Times a Day As Needed., Disp: , Rfl:     donepezil (ARICEPT) 5 MG tablet, Take 1 tablet by mouth Every Night., Disp: , Rfl:     Garlic 1000 MG capsule, Take 1 capsule by mouth Daily., Disp: , Rfl:     HYDROcodone-acetaminophen (NORCO) 5-325 MG per tablet, Take 1 tablet by mouth Every 6 (Six) Hours As Needed., Disp: , Rfl:     imipramine (TOFRANIL) 10 MG tablet, Take 1 tablet by mouth 4 (Four) Times a Day., Disp: , Rfl:     isosorbide mononitrate (IMDUR) 60 MG 24 hr tablet, Take 1 tablet by mouth Daily., Disp: 30 tablet, Rfl: 5    memantine (NAMENDA) 5 MG tablet, Take 1 tablet by mouth 2 (Two) Times a Day., Disp: , Rfl:     nitroglycerin (NITROSTAT) 0.4 MG SL tablet, 1 under the tongue as needed for angina, may repeat q5mins for up three doses, Disp: 25 tablet, Rfl: 3    Omega-3 Fatty Acids (fish oil) 1000 MG capsule capsule, Take  by mouth Daily With Breakfast., Disp: , Rfl:     sacubitril-valsartan (Entresto) 24-26 MG tablet, Take 1 tablet by mouth 2  "(Two) Times a Day., Disp: 180 tablet, Rfl: 0    tiZANidine (ZANAFLEX) 4 MG tablet, Take 1 tablet by mouth At Night As Needed for Muscle Spasms., Disp: , Rfl:     Vericiguat (Verquvo) 10 MG tablet, Take 1 tablet by mouth Every Morning., Disp: 90 tablet, Rfl: 2    The following portions of the patient's history were reviewed and updated as appropriate: allergies, current medications, past family history, past medical history, past social history, past surgical history and problem list.    Social History     Tobacco Use    Smoking status: Former     Current packs/day: 0.00     Types: Cigarettes     Quit date:      Years since quittin.8    Smokeless tobacco: Never   Vaping Use    Vaping status: Never Used   Substance Use Topics    Alcohol use: No    Drug use: No     Review of Systems   Constitutional: Negative for chills and fever.   HENT:  Negative for nosebleeds and sore throat.    Cardiovascular:  Positive for dyspnea on exertion.   Respiratory:  Negative for cough, hemoptysis and wheezing.    Gastrointestinal:  Negative for abdominal pain, hematemesis, hematochezia, melena, nausea and vomiting.   Genitourinary:  Negative for dysuria and hematuria.   Neurological:  Negative for headaches.     Objective   Vitals:    24 1328   BP: 130/65   Pulse: 65   SpO2: 97%   Weight: 57.1 kg (125 lb 12.8 oz)   Height: 149.9 cm (59\")     Body mass index is 25.41 kg/m².    Vitals reviewed.   Cardiovascular:      PMI at left midclavicular line. Normal rate. Regular rhythm. Normal S1. Normal S2.       Murmurs: There is a grade 3/6 high frequency blowing holosystolic murmur at the apex.      No gallop.  No click. No rub.   Pulses:     Intact distal pulses.   Edema:     Peripheral edema absent.         Lab Results   Component Value Date     2023    K 5.0 2023     2023    CO2 27.0 2023    BUN 51 (H) 2023    CREATININE 1.24 (H) 2023    GLUCOSE 82 2023    CALCIUM 9.5 " "05/04/2023    AST 24 05/04/2023    ALT 32 05/04/2023    ALKPHOS 100 05/04/2023    LABIL2 2.8 (H) 05/31/2019     No results found for: \"CKTOTAL\"  Lab Results   Component Value Date    WBC 6.08 05/31/2016    HGB 13.1 05/31/2016    HCT 38.6 05/31/2016     05/31/2016     Lab Results   Component Value Date    INR 1.00 05/31/2016           Assessment & Plan    Diagnosis Plan   1. ASCVD (arteriosclerotic cardiovascular disease), s/p previous MI, s/p CABG in 1997, with stable angina.        2. Ischemic cardiomyopathy with an ejection fraction of about 30 to 35% with NYHA class III dyspnea, appears compensated.        3. Chronic systolic heart failure, appears compensated.           4. ICD (implantable cardioverter-defibrillator), dual, in situ with no ICD discharges         Recommendations:  For her ischemic cardiomyopathy, will continue with Entresto, Farxiga and increase the dose of carvedilol to 12.5 mg p.o. twice daily.  For her coronary artery disease, continue with aspirin, atorvastatin and carvedilol.  I have asked her to be careful about salt intake especially around the holidays as she has had some recent weight gain.    Return in about 3 months (around 2/18/2025).    As always, Kreis, Samuel Duane, MD  I appreciate very much the opportunity to participate in the cardiovascular care of your patients. Please do not hesitate to call me with any questions with regards to Kerri Kinney's evaluation and management.       With Best Regards,        Krish Ocasio MD, [facility]    Please note that portions of this note were completed with a voice recognition program.          "

## 2024-12-05 ENCOUNTER — TELEPHONE (OUTPATIENT)
Dept: CARDIOLOGY | Facility: CLINIC | Age: 79
End: 2024-12-05
Payer: MEDICARE

## 2024-12-05 NOTE — TELEPHONE ENCOUNTER
They called to check on patiets surgery clearance. She is having a right reverse shoulder arthoplasty.   Dr. Garcia. [facility].     Fax: [phone]  Phone: [phone]    Thanks!

## 2024-12-05 NOTE — TELEPHONE ENCOUNTER
Cardiac Risk Assessment  Procedure: right reverse shoulder arthoplasty  LS: 11/18/24  F/up: 2/24/25  Stress: 2/8/24  Echo: 2/11/24      Form filled out and placed on  [facility] desk. They are needing an updated clearance.

## 2024-12-06 NOTE — TELEPHONE ENCOUNTER
Hub to relay.     Made pt an apt for Monday at 2:30 pm with Amarjit. Called pt no answer JOSAFAT.

## 2025-02-11 RX ORDER — ISOSORBIDE MONONITRATE 60 MG/1
60 TABLET, EXTENDED RELEASE ORAL DAILY
Qty: 30 TABLET | Refills: 5 | Status: SHIPPED | OUTPATIENT
Start: 2025-02-11

## 2025-03-24 ENCOUNTER — TELEPHONE (OUTPATIENT)
Dept: CARDIOLOGY | Facility: CLINIC | Age: 80
End: 2025-03-24
Payer: MEDICARE

## 2025-03-24 NOTE — TELEPHONE ENCOUNTER
Lvm for pt letting her know her new appt date, time, location & who she will be seeing. Mailed out the appt reminder paper.       I have sent a message to Kylah in the [employer] to not mail out a no show letter.

## 2025-03-26 ENCOUNTER — OFFICE VISIT (OUTPATIENT)
Dept: CARDIOLOGY | Facility: CLINIC | Age: 80
End: 2025-03-26
Payer: MEDICAID

## 2025-03-26 VITALS
HEART RATE: 71 BPM | BODY MASS INDEX: 25.03 KG/M2 | HEIGHT: 57 IN | WEIGHT: 116 LBS | SYSTOLIC BLOOD PRESSURE: 128 MMHG | RESPIRATION RATE: 16 BRPM | OXYGEN SATURATION: 92 % | DIASTOLIC BLOOD PRESSURE: 75 MMHG

## 2025-03-26 DIAGNOSIS — I25.10 ASCVD (ARTERIOSCLEROTIC CARDIOVASCULAR DISEASE): ICD-10-CM

## 2025-03-26 DIAGNOSIS — I25.5 ISCHEMIC CARDIOMYOPATHY: ICD-10-CM

## 2025-03-26 DIAGNOSIS — Z95.810 ICD (IMPLANTABLE CARDIOVERTER-DEFIBRILLATOR), DUAL, IN SITU: ICD-10-CM

## 2025-03-26 DIAGNOSIS — I50.22 CHRONIC SYSTOLIC HEART FAILURE: Primary | ICD-10-CM

## 2025-03-26 DIAGNOSIS — N18.31 STAGE 3A CHRONIC KIDNEY DISEASE: ICD-10-CM

## 2025-03-26 PROCEDURE — 3078F DIAST BP <80 MM HG: CPT | Performed by: INTERNAL MEDICINE

## 2025-03-26 PROCEDURE — 3074F SYST BP LT 130 MM HG: CPT | Performed by: INTERNAL MEDICINE

## 2025-03-26 PROCEDURE — 99214 OFFICE O/P EST MOD 30 MIN: CPT | Performed by: INTERNAL MEDICINE

## 2025-03-26 PROCEDURE — 93000 ELECTROCARDIOGRAM COMPLETE: CPT | Performed by: INTERNAL MEDICINE

## 2025-03-26 RX ORDER — GABAPENTIN 300 MG/1
300 CAPSULE ORAL 3 TIMES DAILY
COMMUNITY

## 2025-03-26 RX ORDER — CARVEDILOL 6.25 MG/1
6.25 TABLET ORAL 2 TIMES DAILY WITH MEALS
Qty: 60 TABLET | Refills: 5 | Status: SHIPPED | OUTPATIENT
Start: 2025-03-26

## 2025-03-26 RX ORDER — FINERENONE 10 MG/1
10 TABLET, FILM COATED ORAL DAILY
COMMUNITY

## 2025-03-26 NOTE — LETTER
March 26, 2025     Samuel Duane Kreis, MD  [address] [place] [address] [place] [address]    Patient: Kerri Kinney   YOB: 1945   Date of Visit: 3/26/2025     Dear Samuel Duane Kreis, MD:       Thank you for referring Kerri Kinney to me for evaluation. Below are the relevant portions of my assessment and plan of care.    If you have questions, please do not hesitate to call me. I look forward to following Kerri along with you.         Sincerely,        Krish Ocasio MD        CC: No Recipients    Krish Ocasio MD  03/26/25 1710  Sign when Signing Visit  Kreis, Samuel Duane, MD  Kerri Kinney  1945 03/26/2025    Patient Active Problem List   Diagnosis   •  ASCVD, S/P CABG 1997   • CKD (chronic kidney disease) (stage III)   •  S/P ICD with recent gen change 2009   • Preoperative cardiovascular examination   • History of chronic class II to III congestive heart failure   • Coronary artery disease   • Hypertension   • Depression   • Anxiety   • Hyperlipidemia   • GERD (gastroesophageal reflux disease)   • Dizziness   • Ischemic cardiomyopathy with LV ejection fraction of 30-35% in feb 2018 with NYHA class 2-3 dyspnea.   • Precordial pain   • Presence of biventricular implantable cardioverter-defibrillator (ICD)   • Chronic systolic heart failure, appears compensated.       Dear Kreis, Samuel Duane, MD:    Subjective    Kerri Kinney is a 79 y.o. female with the problems as listed above, presents    Chief complaint: Follow-up of CAD and chronic HFrEF.    History of Present Illness: Ms. Kerri Kinney is a very pleasant 79-year-old  female with history of known CAD with previous myocardial infarction's involving the anteroseptal and apical Myocardial segments, with underlying advanced ischemic cardiomyopathy with LVEF of about 31 to 35%, s/p CABG 1997, presents today for regular cardiology follow-up.  On further questioning she indicates that she was admitted to [facility] in  [place] in January 2025 with acute decompensated heart failure which was treated with IV diuretics and discharged home.  On today's visit she states her breathing has improved and overall she is doing okay back to her baseline.  She denies any recent orthopnea or pedal edema.  She lost 9 pounds since 11/18/2014.  She denies any recent chest pain or discomfort.    Regadenoson Stress Test with Myocardial Perfusion SPECT (Multi Study)    Accession Number: [id number]   Date of Study: 2/8/24   Ordering Provider: Krish Ocasio MD   Clinical Indications: Chest Pain        [facility]  Performing Staff   ECG Markle, SPECT Markle: Krish Ocasio MD    Tech: Jose Guadalupe Torres, RN    Support Staff: Rambo Roach           Show images for Stress Test With Myocardial Perfusion (1 Day)   Interpretation Summary   •  A pharmacological stress test was performed using regadenoson without low-level exercise.  •  Findings consistent with a normal ECG stress test.  •  Myocardial perfusion imaging indicates a moderate-to-large-sized infarct located in the anterior wall, septal wall and apex with no significant ischemia noted.  •  Abnormal LV wall motion consistent with dyskinesis of the anterioseptal wall and LV apex.  •  Left ventricular ejection fraction is severely reduced (Calculated EF = 27%).  •  Impressions are consistent with an intermediate risk study.      Complete Transthoracic Echocardiogram with Complete Doppler, Color Flow and Contrast    Accession Number: [id number]   Date of Study: 2/8/24   Ordering Provider: Krish Ocasio MD   Clinical Indications: Heart Failure, Cardiomyopathy, or Sytemic or Pulmonary Hypertension        [facility]  Performing Staff   Cardiology: Krish Ocasio MD    Tech: Kavya Andre             Show images for Adult Transthoracic Echo Complete w/ Color, Spectral and Contrast if necessary per protocol   Clinical Indication    Heart Failure, Cardiomyopathy, or Sytemic or  Pulmonary Hypertension   Dx: Ischemic cardiomyopathy with an ejection fraction of about 30 to 35% with NYHA class III dyspnea. [I25.5 (ICD-10-CM)]; Chronic systolic heart failure, appears compensated. [I50.22 (ICD-10-CM)]     Interpretation Summary   •  The left ventricular cavity is mildly dilated.  •  The following left ventricular wall segments are hypokinetic: mid anterior, apical anterior and basal anterior. The following left ventricular wall segments are akinetic: apical inferior, apical septal, basal inferoseptal, mid inferoseptal and apex.  •  Left ventricular systolic function is severely decreased. Left ventricular ejection fraction appears to be 31 - 35%.  •  There is mild calcification of the aortic valve. The aortic valve was poorly visualized but appears trileaflet. Mild aortic valve regurgitation is present. No hemodynamically significant aortic valve stenosis is present.  •  There is moderate calcification of the mitral valve. Mild mitral valve regurgitation is present. No significant mitral valve stenosis is present.  •  There is no evidence of pericardial effusion. .  •  No significant change since the study on 6/25/19.     Remote Device Check  Biventricular Implantable cardiac defibrillator (ICD) remote  interrogation.  Device successfully sensing intrinsic cardiac events and  marking appropriately.   Available impedance values demonstrate stable  trend within normal limits.  Available lead capture thresholds measured  without significant change.  Adequate safety margin programmed in device  permanent outputs.  Battery discharge trend stable, appropriate given  total time in service.  BIV pacing percentage >= 92%.  EGMs reviewed  against implant indication and diagnosis. It is worth noting that the  vendor auto-threshold algorithm is not turned enabled or available on one  or more leads.  Reported thresholds of these leads with auto-threshold  disabled are from an earlier dated in-clinic  evaluation.    Presenting rhythm is atrial sensed with biventricular paced response.    ATR event, duration 4 seconds, A>V  PMT events, do not appear true  Exam End: 02/12/25 09:28 Last Resulted: 02/12/25 13:32   Received From: [facility]  Result Received: 03/26/25 13:14          Allergies   Allergen Reactions   • Codeine    • Demerol [Meperidine]    • Penicillins    • Terramycin [Oxytetracycline]    :    Current Outpatient Medications:   •  acetaminophen (TYLENOL) 325 MG tablet, Take 1 tablet by mouth Every 4 (Four) Hours As Needed for Fever., Disp: , Rfl:   •  allopurinol (ZYLOPRIM) 100 MG tablet, Take 1 tablet by mouth Daily., Disp: , Rfl:   •  aspirin 81 MG EC tablet, Take 1 tablet by mouth Daily., Disp: , Rfl:   •  atorvastatin (LIPITOR) 40 MG tablet, Take 1 tablet by mouth Daily., Disp: 90 tablet, Rfl: 2  •  bumetanide (BUMEX) 1 MG tablet, Take 1 tablet by mouth Daily. Take 2 tablets daily for 2 days and then 1 tablet daily after that., Disp: 30 tablet, Rfl: 2  •  carvedilol (COREG) 6.25 MG tablet, Take 1 tablet by mouth 2 (Two) Times a Day With Meals., Disp: 60 tablet, Rfl: 5  •  clonazePAM (KlonoPIN) 0.5 MG tablet, Take 1 tablet by mouth 2 (Two) Times a Day As Needed., Disp: , Rfl:   •  dapagliflozin (Farxiga) 5 MG tablet tablet, Take 1 tablet by mouth Daily., Disp: 90 tablet, Rfl: 2  •  dexlansoprazole (DEXILANT) 60 MG capsule, Take 1 capsule by mouth Daily., Disp: , Rfl:   •  diclofenac (VOLTAREN) 1 % gel gel, Apply 4 g topically 4 (Four) Times a Day As Needed., Disp: , Rfl:   •  donepezil (ARICEPT) 5 MG tablet, Take 1 tablet by mouth Every Night., Disp: , Rfl:   •  gabapentin (NEURONTIN) 300 MG capsule, Take 1 capsule by mouth 3 (Three) Times a Day., Disp: , Rfl:   •  HYDROcodone-acetaminophen (NORCO) 5-325 MG per tablet, Take 1 tablet by mouth Every 6 (Six) Hours As Needed., Disp: , Rfl:   •  imipramine (TOFRANIL) 10 MG tablet, Take 1 tablet by mouth 4 (Four) Times a Day., Disp: , Rfl:   •  isosorbide  mononitrate (IMDUR) 60 MG 24 hr tablet, TAKE 1 TABLET BY MOUTH DAILY, Disp: 30 tablet, Rfl: 5  •  memantine (NAMENDA) 5 MG tablet, Take 1 tablet by mouth 2 (Two) Times a Day., Disp: , Rfl:   •  nitroglycerin (NITROSTAT) 0.4 MG SL tablet, 1 under the tongue as needed for angina, may repeat q5mins for up three doses, Disp: 25 tablet, Rfl: 3  •  Omega-3 Fatty Acids (fish oil) 1000 MG capsule capsule, Take  by mouth Daily With Breakfast., Disp: , Rfl:   •  sacubitril-valsartan (Entresto) 24-26 MG tablet, Take 1 tablet by mouth 2 (Two) Times a Day., Disp: 180 tablet, Rfl: 0  •  tiZANidine (ZANAFLEX) 4 MG tablet, Take 1 tablet by mouth At Night As Needed for Muscle Spasms., Disp: , Rfl:   •  Vericiguat (Verquvo) 10 MG tablet, Take 1 tablet by mouth Every Morning., Disp: 90 tablet, Rfl: 2  •  Finerenone (Kerendia) 10 MG tablet, Take 1 tablet by mouth Daily., Disp: , Rfl:   •  Garlic 1000 MG capsule, Take 1 capsule by mouth Daily., Disp: , Rfl:     The following portions of the patient's history were reviewed and updated as appropriate: allergies, current medications, past family history, past medical history, past social history, past surgical history and problem list.    Social History     Tobacco Use   • Smoking status: Former     Current packs/day: 0.00     Types: Cigarettes     Quit date:      Years since quittin.2   • Smokeless tobacco: Never   Vaping Use   • Vaping status: Never Used   Substance Use Topics   • Alcohol use: No   • Drug use: No     Review of Systems   Constitutional: Negative for chills and fever.   HENT:  Negative for nosebleeds and sore throat.    Cardiovascular:  Negative for chest pain.   Respiratory:  Negative for cough, hemoptysis and wheezing.    Gastrointestinal:  Negative for abdominal pain, hematemesis, hematochezia, melena, nausea and vomiting.   Genitourinary:  Negative for dysuria and hematuria.   Neurological:  Negative for headaches.           Objective  Vitals:    25 1329  "  BP: 128/75   Pulse: 71   Resp: 16   SpO2: 92%   Weight: 52.6 kg (116 lb)   Height: 144.8 cm (57\")     Body mass index is 25.1 kg/m².    Vitals reviewed.   Constitutional:       Appearance: Well-developed.   Eyes:      Conjunctiva/sclera: Conjunctivae normal.   HENT:      Head: Normocephalic.   Neck:      Thyroid: No thyromegaly.      Vascular: No JVD.      Trachea: No tracheal deviation.   Pulmonary:      Effort: No respiratory distress.      Breath sounds: No wheezing. No rales.      Comments: Has some decreased breath sounds at the right base.  Cardiovascular:      PMI at left midclavicular line. Normal rate. Regular rhythm. Normal S1. Normal S2.       Murmurs: There is no murmur.      No gallop.  No click. No rub.   Pulses:     Intact distal pulses.   Edema:     Peripheral edema absent.   Abdominal:      General: Bowel sounds are normal.      Palpations: Abdomen is soft. There is no abdominal mass.      Tenderness: There is no abdominal tenderness.   Musculoskeletal:      Cervical back: Normal range of motion and neck supple. Skin:     General: Skin is warm and dry.   Neurological:      Mental Status: Alert and oriented to person, place, and time.      Cranial Nerves: No cranial nerve deficit.         Lab Results   Component Value Date     05/04/2023    K 3.8 02/01/2025     05/04/2023    CO2 27.0 05/04/2023    BUN 51 (H) 05/04/2023    CREATININE 1.24 (H) 05/04/2023    GLUCOSE 82 05/04/2023    CALCIUM 9.5 05/04/2023    AST 24 05/04/2023    ALT 32 05/04/2023    ALKPHOS 100 05/04/2023       Lab Results   Component Value Date    WBC 8.7 02/20/2025    HGB 15 02/20/2025    HCT 46.6 02/20/2025     02/20/2025     Lab Results   Component Value Date    INR 1.11 (H) 01/29/2025    INR 1.03 11/23/2024    INR 1.00 05/31/2016     Lab Results   Component Value Date    MG 1.8 03/24/2025       ECG 12 Lead    Date/Time: 3/26/2025 1:59 PM  Performed by: Krish Ocasio MD    Authorized by: Krish Ocasio MD  " Comparison: compared with previous ECG from 7/20/2023  Similar to previous ECG  Rhythm: sinus rhythm and paced  Comments: Appropriate biventricular pacing.           Assessment & Plan   Diagnosis Plan   1. Chronic systolic heart failure, appears compensated.        2. ASCVD (arteriosclerotic cardiovascular disease), s/p previous MI, s/p CABG in 1997, with stable angina.        3. Ischemic cardiomyopathy with an ejection fraction of about 30 to 35% with NYHA class III dyspnea, appears compensated.        4. ICD (implantable cardioverter-defibrillator), dual, in situ, seems to be functioning adequately.        5. Stage 3a chronic kidney disease          Recommendations  Since we have a room to adjust her GDMT with her blood pressure today, will increase the dose of carvedilol to 6.25 mg p.o. twice daily (patient is currently taking 3.125 mg p.o. twice daily).   Continue with Entresto Farxiga at current doses.  Will consider adding spironolactone later on if her blood pressure allows.  Continue with Verquo at current dose  Continue Bumex at current dose.    Return in about 4 weeks (around 4/23/2025).    As always, Kreis, Samuel Duane, MD  I appreciate very much the opportunity to participate in the cardiovascular care of your patients. Please do not hesitate to call me with any questions with regards to Kerri LUO Kinney's evaluation and management.       With Best Regards,        Krish Ocasio MD, [facility]    Please note that portions of this note were completed with a voice recognition program.

## 2025-03-26 NOTE — PROGRESS NOTES
Kreis, Samuel Duane, MD  Kerri Kinney  1945 03/26/2025    Patient Active Problem List   Diagnosis     ASCVD, S/P CABG 1997    CKD (chronic kidney disease) (stage III)     S/P ICD with recent gen change 2009    Preoperative cardiovascular examination    History of chronic class II to III congestive heart failure    Coronary artery disease    Hypertension    Depression    Anxiety    Hyperlipidemia    GERD (gastroesophageal reflux disease)    Dizziness    Ischemic cardiomyopathy with LV ejection fraction of 30-35% in feb 2018 with NYHA class 2-3 dyspnea.    Precordial pain    Presence of biventricular implantable cardioverter-defibrillator (ICD)    Chronic systolic heart failure, appears compensated.       Dear Kreis, Samuel Duane, MD:    Subjective     Kerri Kinney is a 79 y.o. female with the problems as listed above, presents    Chief complaint: Follow-up of CAD and chronic HFrEF.    History of Present Illness: Ms. Kerri Kinney is a very pleasant 79-year-old  female with history of known CAD with previous myocardial infarction's involving the anteroseptal and apical Myocardial segments, with underlying advanced ischemic cardiomyopathy with LVEF of about 31 to 35%, s/p CABG 1997, presents today for regular cardiology follow-up.  On further questioning she indicates that she was admitted to [facility] in [place] in January 2025 with acute decompensated heart failure which was treated with IV diuretics and discharged home.  On today's visit she states her breathing has improved and overall she is doing okay back to her baseline.  She denies any recent orthopnea or pedal edema.  She lost 9 pounds since 11/18/2014.  She denies any recent chest pain or discomfort.    Regadenoson Stress Test with Myocardial Perfusion SPECT (Multi Study)    Accession Number: [id number]   Date of Study: 2/8/24   Ordering Provider: Krish Ocasio MD   Clinical Indications: Chest Pain        [facility]   Performing Staff   ECG [place], SPECT [place]: Krish Ocasio MD    Tech: Jose Guadalupe Torres RN    Support Staff: Rambo Roach           Show images for Stress Test With Myocardial Perfusion (1 Day)   Interpretation Summary     A pharmacological stress test was performed using regadenoson without low-level exercise.    Findings consistent with a normal ECG stress test.    Myocardial perfusion imaging indicates a moderate-to-large-sized infarct located in the anterior wall, septal wall and apex with no significant ischemia noted.    Abnormal LV wall motion consistent with dyskinesis of the anterioseptal wall and LV apex.    Left ventricular ejection fraction is severely reduced (Calculated EF = 27%).    Impressions are consistent with an intermediate risk study.      Complete Transthoracic Echocardiogram with Complete Doppler, Color Flow and Contrast    Accession Number: [id number]   Date of Study: 2/8/24   Ordering Provider: Krish Ocasio MD   Clinical Indications: Heart Failure, Cardiomyopathy, or Sytemic or Pulmonary Hypertension        [facility]  Performing Staff   Cardiology: Krish Ocasio MD    Tech: Kavya Andre             Show images for Adult Transthoracic Echo Complete w/ Color, Spectral and Contrast if necessary per protocol   Clinical Indication    Heart Failure, Cardiomyopathy, or Sytemic or Pulmonary Hypertension   Dx: Ischemic cardiomyopathy with an ejection fraction of about 30 to 35% with NYHA class III dyspnea. [I25.5 (ICD-10-CM)]; Chronic systolic heart failure, appears compensated. [I50.22 (ICD-10-CM)]     Interpretation Summary     The left ventricular cavity is mildly dilated.    The following left ventricular wall segments are hypokinetic: mid anterior, apical anterior and basal anterior. The following left ventricular wall segments are akinetic: apical inferior, apical septal, basal inferoseptal, mid inferoseptal and apex.    Left ventricular systolic function is  severely decreased. Left ventricular ejection fraction appears to be 31 - 35%.    There is mild calcification of the aortic valve. The aortic valve was poorly visualized but appears trileaflet. Mild aortic valve regurgitation is present. No hemodynamically significant aortic valve stenosis is present.    There is moderate calcification of the mitral valve. Mild mitral valve regurgitation is present. No significant mitral valve stenosis is present.    There is no evidence of pericardial effusion. .    No significant change since the study on 6/25/19.     Remote Device Check  Biventricular Implantable cardiac defibrillator (ICD) remote  interrogation.  Device successfully sensing intrinsic cardiac events and  marking appropriately.   Available impedance values demonstrate stable  trend within normal limits.  Available lead capture thresholds measured  without significant change.  Adequate safety margin programmed in device  permanent outputs.  Battery discharge trend stable, appropriate given  total time in service.  BIV pacing percentage >= 92%.  EGMs reviewed  against implant indication and diagnosis. It is worth noting that the  vendor auto-threshold algorithm is not turned enabled or available on one  or more leads.  Reported thresholds of these leads with auto-threshold  disabled are from an earlier dated in-clinic evaluation.    Presenting rhythm is atrial sensed with biventricular paced response.    ATR event, duration 4 seconds, A>V  PMT events, do not appear true  Exam End: 02/12/25 09:28 Last Resulted: 02/12/25 13:32   Received From: [facility]  Result Received: 03/26/25 13:14          Allergies   Allergen Reactions    Codeine     Demerol [Meperidine]     Penicillins     Terramycin [Oxytetracycline]    :    Current Outpatient Medications:     acetaminophen (TYLENOL) 325 MG tablet, Take 1 tablet by mouth Every 4 (Four) Hours As Needed for Fever., Disp: , Rfl:     allopurinol (ZYLOPRIM) 100 MG tablet, Take  1 tablet by mouth Daily., Disp: , Rfl:     aspirin 81 MG EC tablet, Take 1 tablet by mouth Daily., Disp: , Rfl:     atorvastatin (LIPITOR) 40 MG tablet, Take 1 tablet by mouth Daily., Disp: 90 tablet, Rfl: 2    bumetanide (BUMEX) 1 MG tablet, Take 1 tablet by mouth Daily. Take 2 tablets daily for 2 days and then 1 tablet daily after that., Disp: 30 tablet, Rfl: 2    carvedilol (COREG) 6.25 MG tablet, Take 1 tablet by mouth 2 (Two) Times a Day With Meals., Disp: 60 tablet, Rfl: 5    clonazePAM (KlonoPIN) 0.5 MG tablet, Take 1 tablet by mouth 2 (Two) Times a Day As Needed., Disp: , Rfl:     dapagliflozin (Farxiga) 5 MG tablet tablet, Take 1 tablet by mouth Daily., Disp: 90 tablet, Rfl: 2    dexlansoprazole (DEXILANT) 60 MG capsule, Take 1 capsule by mouth Daily., Disp: , Rfl:     diclofenac (VOLTAREN) 1 % gel gel, Apply 4 g topically 4 (Four) Times a Day As Needed., Disp: , Rfl:     donepezil (ARICEPT) 5 MG tablet, Take 1 tablet by mouth Every Night., Disp: , Rfl:     gabapentin (NEURONTIN) 300 MG capsule, Take 1 capsule by mouth 3 (Three) Times a Day., Disp: , Rfl:     HYDROcodone-acetaminophen (NORCO) 5-325 MG per tablet, Take 1 tablet by mouth Every 6 (Six) Hours As Needed., Disp: , Rfl:     imipramine (TOFRANIL) 10 MG tablet, Take 1 tablet by mouth 4 (Four) Times a Day., Disp: , Rfl:     isosorbide mononitrate (IMDUR) 60 MG 24 hr tablet, TAKE 1 TABLET BY MOUTH DAILY, Disp: 30 tablet, Rfl: 5    memantine (NAMENDA) 5 MG tablet, Take 1 tablet by mouth 2 (Two) Times a Day., Disp: , Rfl:     nitroglycerin (NITROSTAT) 0.4 MG SL tablet, 1 under the tongue as needed for angina, may repeat q5mins for up three doses, Disp: 25 tablet, Rfl: 3    Omega-3 Fatty Acids (fish oil) 1000 MG capsule capsule, Take  by mouth Daily With Breakfast., Disp: , Rfl:     sacubitril-valsartan (Entresto) 24-26 MG tablet, Take 1 tablet by mouth 2 (Two) Times a Day., Disp: 180 tablet, Rfl: 0    tiZANidine (ZANAFLEX) 4 MG tablet, Take 1 tablet by  "mouth At Night As Needed for Muscle Spasms., Disp: , Rfl:     Vericiguat (Verquvo) 10 MG tablet, Take 1 tablet by mouth Every Morning., Disp: 90 tablet, Rfl: 2    Finerenone (Kerendia) 10 MG tablet, Take 1 tablet by mouth Daily., Disp: , Rfl:     Garlic 1000 MG capsule, Take 1 capsule by mouth Daily., Disp: , Rfl:     The following portions of the patient's history were reviewed and updated as appropriate: allergies, current medications, past family history, past medical history, past social history, past surgical history and problem list.    Social History     Tobacco Use    Smoking status: Former     Current packs/day: 0.00     Types: Cigarettes     Quit date:      Years since quittin.2    Smokeless tobacco: Never   Vaping Use    Vaping status: Never Used   Substance Use Topics    Alcohol use: No    Drug use: No     Review of Systems   Constitutional: Negative for chills and fever.   HENT:  Negative for nosebleeds and sore throat.    Cardiovascular:  Negative for chest pain.   Respiratory:  Negative for cough, hemoptysis and wheezing.    Gastrointestinal:  Negative for abdominal pain, hematemesis, hematochezia, melena, nausea and vomiting.   Genitourinary:  Negative for dysuria and hematuria.   Neurological:  Negative for headaches.           Objective   Vitals:    25 1329   BP: 128/75   Pulse: 71   Resp: 16   SpO2: 92%   Weight: 52.6 kg (116 lb)   Height: 144.8 cm (57\")     Body mass index is 25.1 kg/m².    Vitals reviewed.   Constitutional:       Appearance: Well-developed.   Eyes:      Conjunctiva/sclera: Conjunctivae normal.   HENT:      Head: Normocephalic.   Neck:      Thyroid: No thyromegaly.      Vascular: No JVD.      Trachea: No tracheal deviation.   Pulmonary:      Effort: No respiratory distress.      Breath sounds: No wheezing. No rales.      Comments: Has some decreased breath sounds at the right base.  Cardiovascular:      PMI at left midclavicular line. Normal rate. Regular rhythm. " Normal S1. Normal S2.       Murmurs: There is no murmur.      No gallop.  No click. No rub.   Pulses:     Intact distal pulses.   Edema:     Peripheral edema absent.   Abdominal:      General: Bowel sounds are normal.      Palpations: Abdomen is soft. There is no abdominal mass.      Tenderness: There is no abdominal tenderness.   Musculoskeletal:      Cervical back: Normal range of motion and neck supple. Skin:     General: Skin is warm and dry.   Neurological:      Mental Status: Alert and oriented to person, place, and time.      Cranial Nerves: No cranial nerve deficit.         Lab Results   Component Value Date     05/04/2023    K 3.8 02/01/2025     05/04/2023    CO2 27.0 05/04/2023    BUN 51 (H) 05/04/2023    CREATININE 1.24 (H) 05/04/2023    GLUCOSE 82 05/04/2023    CALCIUM 9.5 05/04/2023    AST 24 05/04/2023    ALT 32 05/04/2023    ALKPHOS 100 05/04/2023       Lab Results   Component Value Date    WBC 8.7 02/20/2025    HGB 15 02/20/2025    HCT 46.6 02/20/2025     02/20/2025     Lab Results   Component Value Date    INR 1.11 (H) 01/29/2025    INR 1.03 11/23/2024    INR 1.00 05/31/2016     Lab Results   Component Value Date    MG 1.8 03/24/2025       ECG 12 Lead    Date/Time: 3/26/2025 1:59 PM  Performed by: Krish Ocasio MD    Authorized by: Krish Ocasio MD  Comparison: compared with previous ECG from 7/20/2023  Similar to previous ECG  Rhythm: sinus rhythm and paced  Comments: Appropriate biventricular pacing.           Assessment & Plan    Diagnosis Plan   1. Chronic systolic heart failure, appears compensated.        2. ASCVD (arteriosclerotic cardiovascular disease), s/p previous MI, s/p CABG in 1997, with stable angina.        3. Ischemic cardiomyopathy with an ejection fraction of about 30 to 35% with NYHA class III dyspnea, appears compensated.        4. ICD (implantable cardioverter-defibrillator), dual, in situ, seems to be functioning adequately.        5. Stage 3a chronic  kidney disease          Recommendations  Since we have a room to adjust her GDMT with her blood pressure today, will increase the dose of carvedilol to 6.25 mg p.o. twice daily (patient is currently taking 3.125 mg p.o. twice daily).   Continue with Entresto, Farxiga at current doses.  Will consider adding spironolactone later on if her blood pressure allows.  Continue with Verquo at current dose  Continue Bumex at current dose.    Return in about 4 weeks (around 4/23/2025).    As always, Kreis, Samuel Duane, MD  I appreciate very much the opportunity to participate in the cardiovascular care of your patients. Please do not hesitate to call me with any questions with regards to Kerri LUO Kinney's evaluation and management.       With Best Regards,        Krish Ocasio MD, FACC    Please note that portions of this note were completed with a voice recognition program.

## 2025-05-30 RX ORDER — VERICIGUAT 10 MG/1
10 TABLET, FILM COATED ORAL EVERY MORNING
Qty: 30 TABLET | Refills: 5 | Status: SHIPPED | OUTPATIENT
Start: 2025-05-30

## 2025-06-23 ENCOUNTER — TELEPHONE (OUTPATIENT)
Dept: CARDIOLOGY | Facility: CLINIC | Age: 80
End: 2025-06-23
Payer: MEDICARE

## 2025-06-23 NOTE — TELEPHONE ENCOUNTER
Cardiac Risk Assessment  Procedure: right reverse total shoulder arthroplasty   LS: 3/26/25  F/up: 8/1/25  Stress: 2/8/24  Echo: 2/11/24        Form filled out and placed on Charity's desk.

## 2025-07-11 ENCOUNTER — TELEPHONE (OUTPATIENT)
Dept: CARDIOLOGY | Facility: CLINIC | Age: 80
End: 2025-07-11
Payer: MEDICARE

## 2025-07-11 NOTE — TELEPHONE ENCOUNTER
Lvm for patient letting them know our [facility] has changed locations as of August 1st 2025. New address is [address] [place] [address]. Appt reminder w/ new address along w/ letter and map has been sent out by mail.     **HUB CAN RELAY MESSAGE**

## 2025-08-01 ENCOUNTER — OFFICE VISIT (OUTPATIENT)
Dept: CARDIOLOGY | Facility: CLINIC | Age: 80
End: 2025-08-01
Payer: MEDICARE

## 2025-08-01 ENCOUNTER — TELEPHONE (OUTPATIENT)
Dept: CARDIOLOGY | Facility: CLINIC | Age: 80
End: 2025-08-01
Payer: MEDICARE

## 2025-08-01 VITALS
HEIGHT: 59 IN | SYSTOLIC BLOOD PRESSURE: 126 MMHG | WEIGHT: 118 LBS | HEART RATE: 83 BPM | BODY MASS INDEX: 23.79 KG/M2 | OXYGEN SATURATION: 97 % | DIASTOLIC BLOOD PRESSURE: 64 MMHG

## 2025-08-01 DIAGNOSIS — I48.0 PAROXYSMAL ATRIAL FIBRILLATION: ICD-10-CM

## 2025-08-01 DIAGNOSIS — I25.5 ISCHEMIC CARDIOMYOPATHY: ICD-10-CM

## 2025-08-01 DIAGNOSIS — Z95.810 PRESENCE OF BIVENTRICULAR IMPLANTABLE CARDIOVERTER-DEFIBRILLATOR (ICD): Primary | ICD-10-CM

## 2025-08-01 NOTE — TELEPHONE ENCOUNTER
Consult today with Dr. Rob Cleveland. Pt has a [employer] device. Will tranfer remote monitoring from [facility] to [facility]. Left voice mail message with  device clinic.